# Patient Record
Sex: MALE | Race: WHITE | Employment: OTHER | URBAN - METROPOLITAN AREA
[De-identification: names, ages, dates, MRNs, and addresses within clinical notes are randomized per-mention and may not be internally consistent; named-entity substitution may affect disease eponyms.]

---

## 2017-06-03 ENCOUNTER — HOSPITAL ENCOUNTER (EMERGENCY)
Age: 72
Discharge: HOME | End: 2017-06-03
Attending: EMERGENCY MEDICINE
Payer: COMMERCIAL

## 2017-06-03 VITALS
TEMPERATURE: 101 F | WEIGHT: 240 LBS | HEIGHT: 70 IN | SYSTOLIC BLOOD PRESSURE: 95 MMHG | RESPIRATION RATE: 14 BRPM | BODY MASS INDEX: 34.36 KG/M2 | DIASTOLIC BLOOD PRESSURE: 56 MMHG | OXYGEN SATURATION: 92 % | HEART RATE: 80 BPM

## 2017-06-03 DIAGNOSIS — J20.9 ACUTE BRONCHITIS, UNSPECIFIED ORGANISM: Primary | ICD-10-CM

## 2017-06-03 DIAGNOSIS — T50.905A ADVERSE REACTION TO DRUG, INITIAL ENCOUNTER: ICD-10-CM

## 2017-06-03 DIAGNOSIS — I48.91 ATRIAL FIBRILLATION, UNSPECIFIED TYPE (HCC): ICD-10-CM

## 2017-06-03 LAB
ANION GAP SERPL CALCULATED.3IONS-SCNC: 12 MEQ/L (ref 7–13)
BANDED NEUTROPHILS RELATIVE PERCENT: 21 %
BASOPHILS ABSOLUTE: 0.1 K/UL (ref 0–0.2)
BASOPHILS RELATIVE PERCENT: 1 %
BUN BLDV-MCNC: 21 MG/DL (ref 8–23)
CALCIUM SERPL-MCNC: 8.9 MG/DL (ref 8.6–10.2)
CHLORIDE BLD-SCNC: 98 MEQ/L (ref 98–107)
CO2: 26 MEQ/L (ref 22–29)
CREAT SERPL-MCNC: 0.85 MG/DL (ref 0.7–1.2)
DOHLE BODIES: ABNORMAL
EOSINOPHILS ABSOLUTE: 0.1 K/UL (ref 0–0.7)
EOSINOPHILS RELATIVE PERCENT: 1 %
GFR AFRICAN AMERICAN: >60
GFR NON-AFRICAN AMERICAN: >60
GLUCOSE BLD-MCNC: 88 MG/DL (ref 74–109)
HCT VFR BLD CALC: 47.4 % (ref 42–52)
HEMOGLOBIN: 16.1 G/DL (ref 14–18)
LYMPHOCYTES ABSOLUTE: 1.7 K/UL (ref 1–4.8)
LYMPHOCYTES RELATIVE PERCENT: 26 %
MCH RBC QN AUTO: 33.5 PG (ref 27–31.3)
MCHC RBC AUTO-ENTMCNC: 33.9 % (ref 33–37)
MCV RBC AUTO: 98.9 FL (ref 80–100)
MONOCYTES ABSOLUTE: 0.1 K/UL (ref 0.2–0.8)
MONOCYTES RELATIVE PERCENT: 1 %
NEUTROPHILS ABSOLUTE: 4.8 K/UL (ref 1.4–6.5)
NEUTROPHILS RELATIVE PERCENT: 51 %
PDW BLD-RTO: 14.7 % (ref 11.5–14.5)
PLATELET # BLD: 115 K/UL (ref 130–400)
PLATELET SLIDE REVIEW: ABNORMAL
POTASSIUM SERPL-SCNC: 4 MEQ/L (ref 3.5–5.1)
RBC # BLD: 4.8 M/UL (ref 4.7–6.1)
RBC # BLD: NORMAL 10*6/UL
S PYO AG THROAT QL: NEGATIVE
SMUDGE CELLS: 6.7
SODIUM BLD-SCNC: 136 MEQ/L (ref 132–144)
TROPONIN: <0.01 NG/ML (ref 0–0.01)
VACUOLATED NEUTROPHILS: PRESENT
WBC # BLD: 6.6 K/UL (ref 4.8–10.8)

## 2017-06-03 PROCEDURE — 84484 ASSAY OF TROPONIN QUANT: CPT

## 2017-06-03 PROCEDURE — 87081 CULTURE SCREEN ONLY: CPT

## 2017-06-03 PROCEDURE — 87880 STREP A ASSAY W/OPTIC: CPT

## 2017-06-03 PROCEDURE — 36415 COLL VENOUS BLD VENIPUNCTURE: CPT

## 2017-06-03 PROCEDURE — 85025 COMPLETE CBC W/AUTO DIFF WBC: CPT

## 2017-06-03 PROCEDURE — 93005 ELECTROCARDIOGRAM TRACING: CPT

## 2017-06-03 PROCEDURE — 99284 EMERGENCY DEPT VISIT MOD MDM: CPT

## 2017-06-03 PROCEDURE — 80048 BASIC METABOLIC PNL TOTAL CA: CPT

## 2017-06-03 RX ORDER — SODIUM CHLORIDE 0.9 % (FLUSH) 0.9 %
3 SYRINGE (ML) INJECTION EVERY 8 HOURS
Status: DISCONTINUED | OUTPATIENT
Start: 2017-06-03 | End: 2017-06-03 | Stop reason: HOSPADM

## 2017-06-03 RX ORDER — AZITHROMYCIN 250 MG/1
TABLET, FILM COATED ORAL
Qty: 1 PACKET | Refills: 0 | Status: ON HOLD | OUTPATIENT
Start: 2017-06-03 | End: 2018-01-11

## 2017-06-05 LAB — S PYO THROAT QL CULT: NORMAL

## 2017-06-06 LAB
EKG ATRIAL RATE: 78 BPM
EKG Q-T INTERVAL: 330 MS
EKG QRS DURATION: 84 MS
EKG QTC CALCULATION (BAZETT): 442 MS
EKG R AXIS: 41 DEGREES
EKG T AXIS: -7 DEGREES
EKG VENTRICULAR RATE: 108 BPM

## 2017-12-12 LAB
ALBUMIN SERPL-MCNC: 4.1 G/DL (ref 3.9–4.9)
ALP BLD-CCNC: 85 U/L (ref 35–104)
ALT SERPL-CCNC: 45 U/L (ref 0–41)
ANION GAP SERPL CALCULATED.3IONS-SCNC: 15 MEQ/L (ref 9–17)
AST SERPL-CCNC: 28 U/L (ref 0–40)
BILIRUB SERPL-MCNC: 0.6 MG/DL (ref 0–1.2)
BUN BLDV-MCNC: 13 MG/DL (ref 8–23)
CALCIUM SERPL-MCNC: 9.1 MG/DL (ref 8.6–10.2)
CHLORIDE BLD-SCNC: 98 MEQ/L (ref 97–107)
CHOLESTEROL, TOTAL: 142 MG/DL (ref 0–199)
CO2: 27 MEQ/L (ref 17–24)
CREAT SERPL-MCNC: 0.88 MG/DL (ref 0.7–1.2)
GFR AFRICAN AMERICAN: >60
GFR NON-AFRICAN AMERICAN: >60
GLOBULIN: 2.2 G/DL (ref 2.3–3.5)
GLUCOSE BLD-MCNC: 107 MG/DL (ref 74–109)
HDLC SERPL-MCNC: 34 MG/DL (ref 40–59)
LDL CHOLESTEROL CALCULATED: 72 MG/DL (ref 0–129)
POTASSIUM SERPL-SCNC: 4.7 MEQ/L (ref 3.2–4.4)
SODIUM BLD-SCNC: 140 MEQ/L (ref 132–138)
TOTAL PROTEIN: 6.3 G/DL (ref 6.4–8.1)
TRIGL SERPL-MCNC: 179 MG/DL (ref 0–200)

## 2018-01-11 ENCOUNTER — HOSPITAL ENCOUNTER (OUTPATIENT)
Age: 73
Setting detail: OUTPATIENT SURGERY
Discharge: HOME | End: 2018-01-11
Attending: SPECIALIST | Admitting: SPECIALIST
Payer: COMMERCIAL

## 2018-01-11 ENCOUNTER — ANESTHESIA (OUTPATIENT)
Dept: ENDOSCOPY | Age: 73
End: 2018-01-11
Payer: COMMERCIAL

## 2018-01-11 ENCOUNTER — ANESTHESIA EVENT (OUTPATIENT)
Dept: ENDOSCOPY | Age: 73
End: 2018-01-11
Payer: COMMERCIAL

## 2018-01-11 VITALS
SYSTOLIC BLOOD PRESSURE: 132 MMHG | DIASTOLIC BLOOD PRESSURE: 74 MMHG | OXYGEN SATURATION: 99 % | RESPIRATION RATE: 14 BRPM

## 2018-01-11 VITALS
HEIGHT: 70 IN | DIASTOLIC BLOOD PRESSURE: 52 MMHG | HEART RATE: 93 BPM | WEIGHT: 230 LBS | BODY MASS INDEX: 32.93 KG/M2 | SYSTOLIC BLOOD PRESSURE: 120 MMHG | TEMPERATURE: 98.1 F | OXYGEN SATURATION: 95 % | RESPIRATION RATE: 16 BRPM

## 2018-01-11 PROCEDURE — 88305 TISSUE EXAM BY PATHOLOGIST: CPT

## 2018-01-11 PROCEDURE — 2500000003 HC RX 250 WO HCPCS: Performed by: NURSE ANESTHETIST, CERTIFIED REGISTERED

## 2018-01-11 PROCEDURE — 88342 IMHCHEM/IMCYTCHM 1ST ANTB: CPT

## 2018-01-11 PROCEDURE — 6360000002 HC RX W HCPCS: Performed by: NURSE ANESTHETIST, CERTIFIED REGISTERED

## 2018-01-11 PROCEDURE — 3700000001 HC ADD 15 MINUTES (ANESTHESIA): Performed by: SPECIALIST

## 2018-01-11 PROCEDURE — 3609017100 HC EGD: Performed by: SPECIALIST

## 2018-01-11 PROCEDURE — 7100000010 HC PHASE II RECOVERY - FIRST 15 MIN: Performed by: SPECIALIST

## 2018-01-11 PROCEDURE — 3609027000 HC COLONOSCOPY: Performed by: SPECIALIST

## 2018-01-11 PROCEDURE — 3700000000 HC ANESTHESIA ATTENDED CARE: Performed by: SPECIALIST

## 2018-01-11 RX ORDER — MULTIVIT-MIN/IRON/FOLIC ACID/K 18-600-40
1 CAPSULE ORAL DAILY
COMMUNITY

## 2018-01-11 RX ORDER — SODIUM CHLORIDE 0.9 % (FLUSH) 0.9 %
10 SYRINGE (ML) INJECTION EVERY 12 HOURS SCHEDULED
Status: DISCONTINUED | OUTPATIENT
Start: 2018-01-11 | End: 2018-01-11 | Stop reason: HOSPADM

## 2018-01-11 RX ORDER — GLYCOPYRROLATE 0.2 MG/ML
INJECTION INTRAMUSCULAR; INTRAVENOUS PRN
Status: DISCONTINUED | OUTPATIENT
Start: 2018-01-11 | End: 2018-01-11 | Stop reason: SDUPTHER

## 2018-01-11 RX ORDER — LIDOCAINE HYDROCHLORIDE 10 MG/ML
1 INJECTION, SOLUTION EPIDURAL; INFILTRATION; INTRACAUDAL; PERINEURAL
Status: DISCONTINUED | OUTPATIENT
Start: 2018-01-11 | End: 2018-01-11 | Stop reason: HOSPADM

## 2018-01-11 RX ORDER — SODIUM CHLORIDE 0.9 % (FLUSH) 0.9 %
10 SYRINGE (ML) INJECTION PRN
Status: DISCONTINUED | OUTPATIENT
Start: 2018-01-11 | End: 2018-01-11 | Stop reason: HOSPADM

## 2018-01-11 RX ORDER — PROPOFOL 10 MG/ML
INJECTION, EMULSION INTRAVENOUS PRN
Status: DISCONTINUED | OUTPATIENT
Start: 2018-01-11 | End: 2018-01-11 | Stop reason: SDUPTHER

## 2018-01-11 RX ORDER — LIDOCAINE HYDROCHLORIDE 20 MG/ML
INJECTION, SOLUTION INFILTRATION; PERINEURAL PRN
Status: DISCONTINUED | OUTPATIENT
Start: 2018-01-11 | End: 2018-01-11 | Stop reason: SDUPTHER

## 2018-01-11 RX ORDER — SODIUM CHLORIDE 9 MG/ML
INJECTION, SOLUTION INTRAVENOUS CONTINUOUS
Status: DISCONTINUED | OUTPATIENT
Start: 2018-01-11 | End: 2018-01-11 | Stop reason: HOSPADM

## 2018-01-11 RX ORDER — PROPOFOL 10 MG/ML
INJECTION, EMULSION INTRAVENOUS CONTINUOUS PRN
Status: DISCONTINUED | OUTPATIENT
Start: 2018-01-11 | End: 2018-01-11 | Stop reason: SDUPTHER

## 2018-01-11 RX ORDER — ASCORBIC ACID
1 CRYSTALS ORAL DAILY
COMMUNITY

## 2018-01-11 RX ADMIN — LIDOCAINE HYDROCHLORIDE 60 MG: 20 INJECTION, SOLUTION INFILTRATION; PERINEURAL at 10:35

## 2018-01-11 RX ADMIN — GLYCOPYRROLATE 0.2 MG: 0.2 INJECTION INTRAMUSCULAR; INTRAVENOUS at 10:56

## 2018-01-11 RX ADMIN — PROPOFOL 80 MG: 10 INJECTION, EMULSION INTRAVENOUS at 10:35

## 2018-01-11 RX ADMIN — PROPOFOL 120 MCG/KG/MIN: 10 INJECTION, EMULSION INTRAVENOUS at 10:35

## 2018-01-11 ASSESSMENT — ENCOUNTER SYMPTOMS: SHORTNESS OF BREATH: 1

## 2018-01-11 ASSESSMENT — LIFESTYLE VARIABLES: SMOKING_STATUS: 0

## 2018-01-11 NOTE — ANESTHESIA PRE PROCEDURE
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,    Alcohol use 4.2 oz/week     2 Glasses of wine, 2 Cans of beer per week      Comment: 5-6x a week                                Counseling given: Not Answered      Vital Signs (Current):   Vitals:    01/11/18 1003   BP: (!) 163/83   Pulse: 77   Resp: 16   Temp: 36.7 °C (98.1 °F)   TempSrc: Temporal   SpO2: 92%   Weight: 230 lb (104.3 kg)   Height: 5' 10\" (1.778 m)                                              BP Readings from Last 3 Encounters:   01/11/18 (!) 163/83   06/03/17 (!) 95/56   12/20/16 120/80       NPO Status: Time of last liquid consumption: 2330                        Time of last solid consumption: 1800                        Date of last liquid consumption: 01/10/18                        Date of last solid food consumption: 01/09/18    BMI:   Wt Readings from Last 3 Encounters:   01/11/18 230 lb (104.3 kg)   06/03/17 240 lb (108.9 kg)   12/20/16 235 lb (106.6 kg)     Body mass index is 33 kg/m². CBC:   Lab Results   Component Value Date    WBC 6.6 06/03/2017    RBC 4.80 06/03/2017    HGB 16.1 06/03/2017    HCT 47.4 06/03/2017    MCV 98.9 06/03/2017    RDW 14.7 06/03/2017     06/03/2017       CMP:   Lab Results   Component Value Date     12/12/2017    K 4.7 12/12/2017    CL 98 12/12/2017    CO2 27 12/12/2017    BUN 13 12/12/2017    CREATININE 0.88 12/12/2017    GFRAA >60.0 12/12/2017    LABGLOM >60.0 12/12/2017    GLUCOSE 107 12/12/2017    PROT 6.3 12/12/2017    CALCIUM 9.1 12/12/2017    BILITOT 0.6 12/12/2017    ALKPHOS 85 12/12/2017    AST 28 12/12/2017    ALT 45 12/12/2017       POC Tests: No results for input(s): POCGLU, POCNA, POCK, POCCL, POCBUN, POCHEMO, POCHCT in the last 72 hours.     Coags:   Lab Results   Component Value Date    PROTIME 10.5 08/21/2015    INR 1.0 08/21/2015    APTT 26.1 08/21/2015       HCG (If Applicable): No results found for: PREGTESTUR, PREGSERUM, HCG, HCGQUANT     ABGs: No results found for: PHART, PO2ART, LPS7NYP, HME0WBD, BEART, E4HSSTRM     Type & Screen (If Applicable):  No results found for: LABABO, [address]    Anesthesia Evaluation  Patient summary reviewed and Nursing notes reviewed no history of anesthetic complications:   Airway: Mallampati: II  TM distance: >3 FB   Neck ROM: full  Mouth opening: > = 3 FB Dental: normal exam         Pulmonary:normal exam    (+) shortness of breath:      (-) not a current smoker                           Cardiovascular:  Exercise tolerance: no interval change,   (+) hypertension: no interval change, CABG/stent: no interval change, dysrhythmias: atrial fibrillation, hyperlipidemia        Rhythm: regular  Rate: normal           Beta Blocker:  Dose within 24 Hrs         Neuro/Psych:   (+) neuromuscular disease:,             GI/Hepatic/Renal:   (+) GERD: no interval change,           Endo/Other: Negative Endo/Other ROS                    Abdominal:           Vascular:                                        Anesthesia Plan      MAC     ASA 3       Induction: intravenous. Anesthetic plan and risks discussed with patient. Plan discussed with attending and CRNA.                   Dolly Reyes CRNA   1/11/2018

## 2018-05-15 LAB
ALBUMIN SERPL-MCNC: 4.3 G/DL (ref 3.9–4.9)
ALP BLD-CCNC: 77 U/L (ref 35–104)
ALT SERPL-CCNC: 41 U/L (ref 0–41)
ANION GAP SERPL CALCULATED.3IONS-SCNC: 12 MEQ/L (ref 7–13)
AST SERPL-CCNC: 30 U/L (ref 0–40)
BILIRUB SERPL-MCNC: 0.6 MG/DL (ref 0–1.2)
BUN BLDV-MCNC: 17 MG/DL (ref 8–23)
CALCIUM SERPL-MCNC: 9.3 MG/DL (ref 8.6–10.2)
CHLORIDE BLD-SCNC: 98 MEQ/L (ref 98–107)
CHOLESTEROL, TOTAL: 145 MG/DL (ref 0–199)
CO2: 28 MEQ/L (ref 22–29)
CREAT SERPL-MCNC: 0.87 MG/DL (ref 0.7–1.2)
GFR AFRICAN AMERICAN: >60
GFR NON-AFRICAN AMERICAN: >60
GLOBULIN: 2 G/DL (ref 2.3–3.5)
GLUCOSE BLD-MCNC: 105 MG/DL (ref 74–109)
HDLC SERPL-MCNC: 37 MG/DL (ref 40–59)
LDL CHOLESTEROL CALCULATED: 75 MG/DL (ref 0–129)
POTASSIUM SERPL-SCNC: 4.5 MEQ/L (ref 3.5–5.1)
SODIUM BLD-SCNC: 138 MEQ/L (ref 132–144)
TOTAL PROTEIN: 6.3 G/DL (ref 6.4–8.1)
TRIGL SERPL-MCNC: 165 MG/DL (ref 0–200)

## 2019-04-12 LAB
ALBUMIN SERPL-MCNC: 4.2 G/DL (ref 3.5–4.6)
ALP BLD-CCNC: 76 U/L (ref 35–104)
ALT SERPL-CCNC: 30 U/L (ref 0–41)
ANION GAP SERPL CALCULATED.3IONS-SCNC: 15 MEQ/L (ref 9–15)
AST SERPL-CCNC: 25 U/L (ref 0–40)
BILIRUB SERPL-MCNC: 0.7 MG/DL (ref 0.2–0.7)
BUN BLDV-MCNC: 13 MG/DL (ref 8–23)
CALCIUM SERPL-MCNC: 9.1 MG/DL (ref 8.5–9.9)
CHLORIDE BLD-SCNC: 98 MEQ/L (ref 95–107)
CHOLESTEROL, TOTAL: 131 MG/DL (ref 0–199)
CO2: 25 MEQ/L (ref 20–31)
CREAT SERPL-MCNC: 0.75 MG/DL (ref 0.7–1.2)
GFR AFRICAN AMERICAN: >60
GFR NON-AFRICAN AMERICAN: >60
GLOBULIN: 2.4 G/DL (ref 2.3–3.5)
GLUCOSE BLD-MCNC: 105 MG/DL (ref 70–99)
HDLC SERPL-MCNC: 43 MG/DL (ref 40–59)
LDL CHOLESTEROL CALCULATED: 61 MG/DL (ref 0–129)
POTASSIUM SERPL-SCNC: 4.3 MEQ/L (ref 3.4–4.9)
SODIUM BLD-SCNC: 138 MEQ/L (ref 135–144)
TOTAL PROTEIN: 6.6 G/DL (ref 6.3–8)
TRIGL SERPL-MCNC: 135 MG/DL (ref 0–150)

## 2019-10-14 LAB
ALBUMIN SERPL-MCNC: 4.4 G/DL (ref 3.5–4.6)
ALP BLD-CCNC: 75 U/L (ref 35–104)
ALT SERPL-CCNC: 41 U/L (ref 0–41)
ANION GAP SERPL CALCULATED.3IONS-SCNC: 12 MEQ/L (ref 9–15)
AST SERPL-CCNC: 28 U/L (ref 0–40)
BILIRUB SERPL-MCNC: 0.5 MG/DL (ref 0.2–0.7)
BUN BLDV-MCNC: 15 MG/DL (ref 8–23)
CALCIUM SERPL-MCNC: 9.2 MG/DL (ref 8.5–9.9)
CHLORIDE BLD-SCNC: 99 MEQ/L (ref 95–107)
CHOLESTEROL, TOTAL: 135 MG/DL (ref 0–199)
CO2: 28 MEQ/L (ref 20–31)
CREAT SERPL-MCNC: 0.96 MG/DL (ref 0.7–1.2)
GFR AFRICAN AMERICAN: >60
GFR NON-AFRICAN AMERICAN: >60
GLOBULIN: 2.4 G/DL (ref 2.3–3.5)
GLUCOSE BLD-MCNC: 111 MG/DL (ref 70–99)
HDLC SERPL-MCNC: 39 MG/DL (ref 40–59)
LDL CHOLESTEROL CALCULATED: 59 MG/DL (ref 0–129)
POTASSIUM SERPL-SCNC: 4.3 MEQ/L (ref 3.4–4.9)
SODIUM BLD-SCNC: 139 MEQ/L (ref 135–144)
TOTAL PROTEIN: 6.8 G/DL (ref 6.3–8)
TRIGL SERPL-MCNC: 184 MG/DL (ref 0–150)

## 2020-06-05 ENCOUNTER — HOSPITAL ENCOUNTER (EMERGENCY)
Age: 75
Discharge: HOME | End: 2020-06-06
Attending: EMERGENCY MEDICINE
Payer: COMMERCIAL

## 2020-06-05 VITALS
DIASTOLIC BLOOD PRESSURE: 94 MMHG | SYSTOLIC BLOOD PRESSURE: 150 MMHG | TEMPERATURE: 98.5 F | OXYGEN SATURATION: 95 % | WEIGHT: 250 LBS | RESPIRATION RATE: 16 BRPM | HEART RATE: 83 BPM | BODY MASS INDEX: 35.79 KG/M2 | HEIGHT: 70 IN

## 2020-06-05 PROCEDURE — 6370000000 HC RX 637 (ALT 250 FOR IP): Performed by: EMERGENCY MEDICINE

## 2020-06-05 PROCEDURE — 12001 RPR S/N/AX/GEN/TRNK 2.5CM/<: CPT

## 2020-06-05 PROCEDURE — 99282 EMERGENCY DEPT VISIT SF MDM: CPT

## 2020-06-05 RX ORDER — WOUND DRESSING ADHESIVE - LIQUID
1 LIQUID MISCELLANEOUS ONCE
Status: COMPLETED | OUTPATIENT
Start: 2020-06-05 | End: 2020-06-05

## 2020-06-05 RX ADMIN — Medication 1 EACH: at 23:20

## 2020-06-05 RX ADMIN — WOUND DRESSING ADHESIVE - LIQUID 0.7 ML: LIQUID at 23:20

## 2020-06-05 ASSESSMENT — ENCOUNTER SYMPTOMS
ABDOMINAL PAIN: 0
NAUSEA: 0
SHORTNESS OF BREATH: 0
VOMITING: 0
DIARRHEA: 0
PHOTOPHOBIA: 0
SORE THROAT: 0
COUGH: 0
CHEST TIGHTNESS: 0
EYE DISCHARGE: 0
WHEEZING: 0
ABDOMINAL DISTENTION: 0

## 2020-06-06 NOTE — ED PROVIDER NOTES
 Arthritis     Back pain     CAD (coronary artery disease)     Chicken pox     H/O bronchitis     H/O hemorrhoids     H/O: pneumonia     Heart disease     High blood pressure     Hyperlipidemia     Measles     Numbness     Shortness of breath          SURGICALHISTORY       Past Surgical History:   Procedure Laterality Date    BACK SURGERY      CORONARY ANGIOPLASTY WITH STENT PLACEMENT      LUMBAR LAMINECTOMY      MS COLONOSCOPY FLX DX W/COLLJ SPEC WHEN PFRMD N/A 1/11/2018    COLONOSCOPY performed by Davion Wahl MD at . [address] ESOPHAGOGASTRODUODENOSCOPY TRANSORAL DIAGNOSTIC N/A 1/11/2018    EGD ESOPHAGOGASTRODUODENOSCOPY performed by Davion Wahl MD at [address]       Previous Medications    ASCORBIC ACID (VITAMIN C) 500 MG CAPS    Take 1 tablet by mouth daily    ASPIRIN 81 MG TABLET    Take 325 mg by mouth    ATORVASTATIN (LIPITOR) 40 MG TABLET    Take by mouth    CHOLECALCIFEROL (VITAMIN D3) 5000 UNITS TABS    Take 5,000 Units by mouth daily    DICLOFENAC (VOLTAREN) 75 MG EC TABLET    Take 75 mg by mouth 2 times daily     FINASTERIDE (PROSCAR) 5 MG TABLET    Take 2.5 mg by mouth daily     GABAPENTIN (NEURONTIN) 600 MG TABLET    TAKE 1 TABLET EVERY EVENING    HANDICAP PLACARD MISC    by Does not apply route    METOPROLOL (TOPROL-XL) 50 MG XL TABLET    Take 25 mg by mouth daily     NUTRITIONAL SUPPLEMENTS PO    Take by mouth    OMEGA-3 FATTY ACIDS (FISH OIL) 1200 MG CPDR    Take 1 capsule by mouth    PRIMIDONE (MYSOLINE) 50 MG TABLET    Take 50 mg by mouth daily     RIVAROXABAN (XARELTO) 20 MG TABS TABLET    Take 20 mg by mouth daily (with breakfast)     TAMSULOSIN (FLOMAX) 0.4 MG CAPSULE    Take 1 capsule by mouth daily for 10 days    TERAZOSIN (HYTRIN) 5 MG CAPSULE        VALSARTAN (DIOVAN) 160 MG TABLET        VIAGRA 100 MG TABLET        VITAMIN E 200 UNITS TABS    Take 1 tablet by mouth daily       ALLERGIES     Patient has no known allergies. FAMILY HISTORY       Family History   Problem Relation Age of Onset    Cancer Mother     Diabetes Mother     Cancer Father     High Blood Pressure Father     Colon Cancer Father           SOCIAL HISTORY       Social History     Socioeconomic History    Marital status:      Spouse name: None    Number of children: None    Years of education: None    Highest education level: None   Occupational History    None   Social Needs    Financial resource strain: None    Food insecurity     Worry: None     Inability: None    Transportation needs     Medical: None     Non-medical: None   Tobacco Use    Smoking status: Former Smoker     Start date: 1961     Last attempt to quit: 1970     Years since quittin.4    Smokeless tobacco: Never Used    Tobacco comment: 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,   Substance and Sexual Activity    Alcohol use:  Yes     Alcohol/week: 7.0 standard drinks     Types: 2 Glasses of wine, 2 Cans of beer per week     Comment: 5-6x a week    Drug use: No    Sexual activity: None   Lifestyle    Physical activity     Days per week: None     Minutes per session: None    Stress: None   Relationships    Social connections     Talks on phone: None     Gets together: None     Attends Jew service: None     Active member of club or organization: None     Attends meetings of clubs or organizations: None     Relationship status: None    Intimate partner violence     Fear of current or ex partner: None     Emotionally abused: None     Physically abused: None     Forced sexual activity: None   Other Topics Concern    None   Social History Narrative    None       SCREENINGS      @FLOW([id number])@      PHYSICAL EXAM    (up to 7 for level 4, 8 or more for level 5)     ED Triage Vitals   BP Temp Temp Source Pulse Resp SpO2 Height Weight   20 2248 20 2244 20 22420 06/05/20 2244 06/05/20 2244 06/05/20 2244 06/05/20 2244   (!) 150/94 98.5 °F (36.9 °C) Oral 83 16 95 % 5' 10\" (1.778 m) 250 lb (113.4 kg)       Physical Exam  Vitals signs and nursing note reviewed. Constitutional:       Appearance: He is well-developed. HENT:      Head: Normocephalic. Nose: Nose normal.      Mouth/Throat:      Mouth: Mucous membranes are moist.   Eyes:      Conjunctiva/sclera: Conjunctivae normal.      Pupils: Pupils are equal, round, and reactive to light. Neck:      Musculoskeletal: Normal range of motion and neck supple. Cardiovascular:      Rate and Rhythm: Normal rate and regular rhythm. Heart sounds: Normal heart sounds. Pulmonary:      Effort: Pulmonary effort is normal.      Breath sounds: Normal breath sounds. Abdominal:      General: Bowel sounds are normal.      Palpations: Abdomen is soft. Tenderness: There is no abdominal tenderness. There is no guarding. Musculoskeletal: Normal range of motion. Hands:    Skin:     General: Skin is warm and dry. Capillary Refill: Capillary refill takes less than 2 seconds. Neurological:      Mental Status: He is alert and oriented to person, place, and time. Psychiatric:         Mood and Affect: Mood normal.         DIAGNOSTIC RESULTS     EKG: All EKG's are interpreted by the Emergency Department Physician who either signs or Co-signsthis chart in the absence of a cardiologist.      RADIOLOGY:   Geryl Angst such as CT, Ultrasound and MRI are read by the radiologist. Plain radiographic images are visualized and preliminarily interpreted by the emergency physician with the below findings:      Interpretation per the Radiologist below, if available at the time ofthis note:    No orders to display         ED BEDSIDE ULTRASOUND:   Performed by ED Physician - none    LABS:  Labs Reviewed - No data to display    All other labs were within normal range or not returned as of this dictation.     EMERGENCY

## 2020-10-26 LAB
ALBUMIN SERPL-MCNC: 4.1 G/DL (ref 3.5–4.6)
ALP BLD-CCNC: 61 U/L (ref 35–104)
ALT SERPL-CCNC: 31 U/L (ref 0–41)
ANION GAP SERPL CALCULATED.3IONS-SCNC: 8 MEQ/L (ref 9–15)
AST SERPL-CCNC: 23 U/L (ref 0–40)
BILIRUB SERPL-MCNC: 0.8 MG/DL (ref 0.2–0.7)
BUN BLDV-MCNC: 13 MG/DL (ref 8–23)
CALCIUM SERPL-MCNC: 9.2 MG/DL (ref 8.5–9.9)
CHLORIDE BLD-SCNC: 101 MEQ/L (ref 95–107)
CHOLESTEROL, TOTAL: 132 MG/DL (ref 0–199)
CO2: 28 MEQ/L (ref 20–31)
CREAT SERPL-MCNC: 1.02 MG/DL (ref 0.7–1.2)
GFR AFRICAN AMERICAN: >60
GFR NON-AFRICAN AMERICAN: >60
GLOBULIN: 2.1 G/DL (ref 2.3–3.5)
GLUCOSE BLD-MCNC: 129 MG/DL (ref 70–99)
HDLC SERPL-MCNC: 39 MG/DL (ref 40–59)
LDL CHOLESTEROL CALCULATED: 57 MG/DL (ref 0–129)
POTASSIUM SERPL-SCNC: 4.3 MEQ/L (ref 3.4–4.9)
SODIUM BLD-SCNC: 137 MEQ/L (ref 135–144)
TOTAL PROTEIN: 6.2 G/DL (ref 6.3–8)
TRIGL SERPL-MCNC: 182 MG/DL (ref 0–150)

## 2021-02-18 ENCOUNTER — NURSE ONLY (OUTPATIENT)
Dept: PRIMARY CARE CLINIC | Age: 76
End: 2021-02-18

## 2021-02-19 LAB
SARS-COV-2: NOT DETECTED
SOURCE: NORMAL

## 2021-04-23 LAB
ALBUMIN SERPL-MCNC: 4.5 G/DL (ref 3.5–4.6)
ALP BLD-CCNC: 68 U/L (ref 35–104)
ALT SERPL-CCNC: 29 U/L (ref 0–41)
ANION GAP SERPL CALCULATED.3IONS-SCNC: 13 MEQ/L (ref 9–15)
AST SERPL-CCNC: 22 U/L (ref 0–40)
BILIRUB SERPL-MCNC: 0.8 MG/DL (ref 0.2–0.7)
BUN BLDV-MCNC: 13 MG/DL (ref 8–23)
CALCIUM SERPL-MCNC: 9.8 MG/DL (ref 8.5–9.9)
CHLORIDE BLD-SCNC: 102 MEQ/L (ref 95–107)
CHOLESTEROL, TOTAL: 123 MG/DL (ref 0–199)
CO2: 24 MEQ/L (ref 20–31)
CREAT SERPL-MCNC: 1.01 MG/DL (ref 0.7–1.2)
GFR AFRICAN AMERICAN: >60
GFR NON-AFRICAN AMERICAN: >60
GLOBULIN: 2.1 G/DL (ref 2.3–3.5)
GLUCOSE BLD-MCNC: 115 MG/DL (ref 70–99)
HDLC SERPL-MCNC: 35 MG/DL (ref 40–59)
LDL CHOLESTEROL CALCULATED: 59 MG/DL (ref 0–129)
POTASSIUM SERPL-SCNC: 4.5 MEQ/L (ref 3.4–4.9)
SODIUM BLD-SCNC: 139 MEQ/L (ref 135–144)
TOTAL PROTEIN: 6.6 G/DL (ref 6.3–8)
TRIGL SERPL-MCNC: 143 MG/DL (ref 0–150)

## 2021-07-13 ENCOUNTER — OFFICE VISIT (OUTPATIENT)
Dept: FAMILY MEDICINE CLINIC | Age: 76
End: 2021-07-13
Payer: COMMERCIAL

## 2021-07-13 VITALS
SYSTOLIC BLOOD PRESSURE: 122 MMHG | HEIGHT: 70 IN | HEART RATE: 83 BPM | DIASTOLIC BLOOD PRESSURE: 80 MMHG | WEIGHT: 248.4 LBS | TEMPERATURE: 97.7 F | BODY MASS INDEX: 35.56 KG/M2 | OXYGEN SATURATION: 97 %

## 2021-07-13 DIAGNOSIS — H60.392 ACUTE INFECTIVE OTITIS EXTERNA, LEFT: Primary | ICD-10-CM

## 2021-07-13 DIAGNOSIS — H92.12 OTORRHEA OF LEFT EAR: ICD-10-CM

## 2021-07-13 PROCEDURE — 4040F PNEUMOC VAC/ADMIN/RCVD: CPT | Performed by: NURSE PRACTITIONER

## 2021-07-13 PROCEDURE — G8427 DOCREV CUR MEDS BY ELIG CLIN: HCPCS | Performed by: NURSE PRACTITIONER

## 2021-07-13 PROCEDURE — 4130F TOPICAL PREP RX AOE: CPT | Performed by: NURSE PRACTITIONER

## 2021-07-13 PROCEDURE — 1123F ACP DISCUSS/DSCN MKR DOCD: CPT | Performed by: NURSE PRACTITIONER

## 2021-07-13 PROCEDURE — 99213 OFFICE O/P EST LOW 20 MIN: CPT | Performed by: NURSE PRACTITIONER

## 2021-07-13 PROCEDURE — 1036F TOBACCO NON-USER: CPT | Performed by: NURSE PRACTITIONER

## 2021-07-13 PROCEDURE — G8417 CALC BMI ABV UP PARAM F/U: HCPCS | Performed by: NURSE PRACTITIONER

## 2021-07-13 RX ORDER — RANOLAZINE 500 MG/1
500 TABLET, EXTENDED RELEASE ORAL 2 TIMES DAILY
COMMUNITY

## 2021-07-13 RX ORDER — CIPROFLOXACIN 500 MG/1
500 TABLET, FILM COATED ORAL 2 TIMES DAILY
Qty: 20 TABLET | Refills: 0 | Status: SHIPPED | OUTPATIENT
Start: 2021-07-13 | End: 2021-07-23

## 2021-07-13 RX ORDER — TADALAFIL 20 MG/1
20 TABLET ORAL PRN
COMMUNITY

## 2021-07-13 SDOH — ECONOMIC STABILITY: FOOD INSECURITY: WITHIN THE PAST 12 MONTHS, YOU WORRIED THAT YOUR FOOD WOULD RUN OUT BEFORE YOU GOT MONEY TO BUY MORE.: NEVER TRUE

## 2021-07-13 SDOH — ECONOMIC STABILITY: TRANSPORTATION INSECURITY
IN THE PAST 12 MONTHS, HAS THE LACK OF TRANSPORTATION KEPT YOU FROM MEDICAL APPOINTMENTS OR FROM GETTING MEDICATIONS?: NO

## 2021-07-13 SDOH — ECONOMIC STABILITY: FOOD INSECURITY: WITHIN THE PAST 12 MONTHS, THE FOOD YOU BOUGHT JUST DIDN'T LAST AND YOU DIDN'T HAVE MONEY TO GET MORE.: NEVER TRUE

## 2021-07-13 SDOH — ECONOMIC STABILITY: TRANSPORTATION INSECURITY
IN THE PAST 12 MONTHS, HAS LACK OF TRANSPORTATION KEPT YOU FROM MEETINGS, WORK, OR FROM GETTING THINGS NEEDED FOR DAILY LIVING?: NO

## 2021-07-13 ASSESSMENT — ENCOUNTER SYMPTOMS
FACIAL SWELLING: 1
DIARRHEA: 0
VOMITING: 0
CHEST TIGHTNESS: 0
SORE THROAT: 0
COUGH: 0
NAUSEA: 0
ABDOMINAL PAIN: 0

## 2021-07-13 ASSESSMENT — PATIENT HEALTH QUESTIONNAIRE - PHQ9
SUM OF ALL RESPONSES TO PHQ QUESTIONS 1-9: 0
SUM OF ALL RESPONSES TO PHQ9 QUESTIONS 1 & 2: 0
2. FEELING DOWN, DEPRESSED OR HOPELESS: 0
1. LITTLE INTEREST OR PLEASURE IN DOING THINGS: 0

## 2021-07-13 ASSESSMENT — SOCIAL DETERMINANTS OF HEALTH (SDOH): HOW HARD IS IT FOR YOU TO PAY FOR THE VERY BASICS LIKE FOOD, HOUSING, MEDICAL CARE, AND HEATING?: NOT HARD AT ALL

## 2021-07-13 NOTE — PATIENT INSTRUCTIONS
1. Antibiotic prescription sent to [facility]  2. Rx is for 10 days -> if your symptoms are fully resolved by day 7- may stop antibiotics after the 2nd dose on day 7  3. Continue water exclusion from affected ear   4. Call or return as needed if symptoms persist or worsen in the next 3 days     Antibiotic Instructions: Complete the full course of antibiotics as ordered. Take each dose with a small snack or meal to lessen potential GI upset. To prevent antibiotic resistance, please take medication as ordered and for the full duration even if you start to feel better. Consider intake of yogurt or probiotic during antibiotic use and for a few days after to help reduce the risk of developing a secondary infection. Separate the yogurt and antibiotic by at least 1 hour. Avoid alcohol while taking antibiotics. Patient Education   Swimmer's Ear: Care Instructions  Your Care Instructions     Swimmer's ear (otitis externa) is inflammation or infection of the ear canal. This is the passage that leads from the outer ear to the eardrum. Any water, sand, or other debris that gets into the ear canal and stays there can cause swimmer's ear. Putting cotton swabs or other items in the ear to clean it can also cause this problem. Swimmer's ear can be very painful. But you can treat the pain and infection with medicines. You should feel better in a few days. Follow-up care is a key part of your treatment and safety. Be sure to make and go to all appointments, and call your doctor if you are having problems. It's also a good idea to know your test results and keep a list of the medicines you take. How can you care for yourself at home? Cleaning and care  · Use antibiotic drops as your doctor directs. · Do not insert ear drops (other than the antibiotic ear drops) or anything else into the ear unless your doctor has told you to. · Avoid getting water in the ear until the problem clears up.  Use cotton lightly coated with petroleum jelly as an earplug. Do not use plastic earplugs. · Use a hair dryer set on low to carefully dry the ear after you shower. · To ease ear pain, hold a warm washcloth against your ear. · Take pain medicines exactly as directed. ? If the doctor gave you a prescription medicine for pain, take it as prescribed. ? If you are not taking a prescription pain medicine, ask your doctor if you can take an over-the-counter medicine. Inserting ear drops  · Warm the drops to body temperature by rolling the container in your hands. Or you can place it in a cup of warm water for a few minutes. · Lie down, with your ear facing up. · Place drops inside the ear. Follow your doctor's instructions (or the directions on the label) for how many drops to use. Gently wiggle the outer ear or pull the ear up and back to help the drops get into the ear. · It's important to keep the liquid in the ear canal for 3 to 5 minutes. When should you call for help? Call your doctor now or seek immediate medical care if:    · You have a new or higher fever.     · You have new or worse pain, swelling, warmth, or redness around or behind your ear.     · You have new or increasing pus or blood draining from your ear. Watch closely for changes in your health, and be sure to contact your doctor if:    · You are not getting better after 2 days (48 hours). Where can you learn more? Go to https://JustGo.Chalkboard. org and sign in to your [employer] account. Enter C706 in the [facility] box to learn more about \"Swimmer's Ear: Care Instructions. \"     If you do not have an account, please click on the \"Sign Up Now\" link. Current as of: December 2, 2020               Content Version: 12.9  © 2006-2021 [employer]. Care instructions adapted under license by [facility] ([facility]).  If you have questions about a medical condition or this instruction, always ask your healthcare professional. [address] any warranty or liability for your use of this information.

## 2021-07-13 NOTE — PROGRESS NOTES
7/13/2021    SUBJECTIVE:     Coleridge Landau, 66 y.o. male presents today with:  Chief Complaint   Patient presents with    Otalgia     Lt ear feels full and achy from swimming, x1week. Otalgia   There is pain in the left ear. This is a new problem. The current episode started in the past 7 days. Associated symptoms include ear discharge, headaches (little bit) and hearing loss (at baseline- currently worse). Pertinent negatives include no abdominal pain, coughing, diarrhea, sore throat or vomiting. Patient denies self- instrumentation trauma, failure to adhere to water precaution instructions, improper administration of ototopic medications, immunosuppression, hx of psoriasis, contact dermatitis. Has been swimming.     Outpatient Medications Marked as Taking for the 7/13/21 encounter (Office Visit) with LINDA Pinon CNP   Medication Sig Dispense Refill    tadalafil (CIALIS) 20 MG tablet Take 20 mg by mouth as needed for Erectile Dysfunction      Vitamin E 200 units TABS Take 1 tablet by mouth daily      Ascorbic Acid (VITAMIN C) 500 MG CAPS Take 1 tablet by mouth daily      Cholecalciferol (VITAMIN D3) 5000 units TABS Take 5,000 Units by mouth daily      rivaroxaban (XARELTO) 20 MG TABS tablet Take 20 mg by mouth daily (with breakfast)       primidone (MYSOLINE) 50 MG tablet Take 50 mg by mouth daily       Handicap Placard MISC by Does not apply route 1 each 0    aspirin 81 MG tablet Take 325 mg by mouth      diclofenac (VOLTAREN) 75 MG EC tablet Take 75 mg by mouth 2 times daily       atorvastatin (LIPITOR) 40 MG tablet Take by mouth      Omega-3 Fatty Acids (FISH OIL) 1200 MG CPDR Take 1 capsule by mouth      finasteride (PROSCAR) 5 MG tablet Take 2.5 mg by mouth daily       metoprolol (TOPROL-XL) 50 MG XL tablet Take 25 mg by mouth daily       terazosin (HYTRIN) 5 MG capsule       NUTRITIONAL SUPPLEMENTS PO Take by mouth       Review of Systems   Constitutional: Positive for fever (tactile). HENT: Positive for ear discharge, ear pain, facial swelling, hearing loss (at baseline- currently worse) and tinnitus (at baseline- currently worse). Negative for dental problem and sore throat. Respiratory: Negative for cough and chest tightness. Cardiovascular: Negative for chest pain. Gastrointestinal: Negative for abdominal pain, diarrhea, nausea and vomiting. Neurological: Positive for headaches (little bit). Hematological: Bruises/bleeds easily (xarelto). OBJECTIVE:     Vitals:    07/13/21 1440 07/13/21 1451   BP: (!) 140/80 122/80   Site:  Right Upper Arm   Position:  Sitting   Cuff Size:  Medium Adult   Pulse: 83    Temp: 97.7 °F (36.5 °C)    SpO2: 97%    Weight: 248 lb 6.4 oz (112.7 kg)    Height: 5' 10\" (1.778 m)      Physical Exam  Vitals reviewed. Constitutional:       General: He is not in acute distress. HENT:      Head: Normocephalic and atraumatic. Right Ear: Tympanic membrane, ear canal and external ear normal. No mastoid tenderness. Left Ear: Drainage, swelling (TM not well seen 2/2 swelling and debris ) and tenderness present. No mastoid tenderness. Nose: Nose normal.      Mouth/Throat:      Mouth: Mucous membranes are moist.      Pharynx: Oropharynx is clear. No oropharyngeal exudate. Eyes:      Pupils: Pupils are equal, round, and reactive to light. Neck:      Trachea: Trachea and phonation normal.   Cardiovascular:      Rate and Rhythm: Normal rate. Rhythm irregular. Pulmonary:      Effort: Pulmonary effort is normal. No tachypnea. Musculoskeletal:         General: Normal range of motion. Cervical back: Normal range of motion and neck supple. Edema (mild L ) present. Lymphadenopathy:      Cervical: No cervical adenopathy. Skin:     General: Skin is warm and dry. Neurological:      General: No focal deficit present. Mental Status: He is alert. Mental status is at baseline.      POC Testing Today: No results found for this visit on 07/13/21. ASSESSMENT & PLAN:     Milka Riley was seen today for otalgia. Diagnoses and all orders for this visit:    Acute infective otitis externa, left  -     ciprofloxacin (CIPRO) 500 MG tablet; Take 1 tablet by mouth 2 times daily for 10 days    Otorrhea of left ear  -     ciprofloxacin (CIPRO) 500 MG tablet; Take 1 tablet by mouth 2 times daily for 10 days      - Water exclusion from affected ear  - If sxs are fully resolved after 7 days- may stop antibiotic gtts    Return for follow-up as needed if symptoms do not begin to improve in the next 3 days . Side effects and adverse effects of any medication prescribed today, as well as treatment plan/rationale, follow-up care, and result expectations have been discussed with the patient. Milka Riley expresses understanding and wishes to proceed with the plan. Discussed signs and symptoms which require immediate follow-up in ED/call to 911. Understanding verbalized. I have reviewed and updated the electronic medical record.     Yany Silveira, APRN - CNP

## 2021-09-20 ENCOUNTER — OFFICE VISIT (OUTPATIENT)
Dept: FAMILY MEDICINE CLINIC | Age: 76
End: 2021-09-20
Payer: COMMERCIAL

## 2021-09-20 VITALS
RESPIRATION RATE: 18 BRPM | OXYGEN SATURATION: 98 % | DIASTOLIC BLOOD PRESSURE: 70 MMHG | WEIGHT: 247 LBS | TEMPERATURE: 97.5 F | HEART RATE: 64 BPM | BODY MASS INDEX: 35.36 KG/M2 | HEIGHT: 70 IN | SYSTOLIC BLOOD PRESSURE: 130 MMHG

## 2021-09-20 DIAGNOSIS — R22.0 LEFT FACIAL SWELLING: ICD-10-CM

## 2021-09-20 DIAGNOSIS — T63.441A BEE STING, ACCIDENTAL OR UNINTENTIONAL, INITIAL ENCOUNTER: Primary | ICD-10-CM

## 2021-09-20 PROCEDURE — 1123F ACP DISCUSS/DSCN MKR DOCD: CPT | Performed by: NURSE PRACTITIONER

## 2021-09-20 PROCEDURE — G8417 CALC BMI ABV UP PARAM F/U: HCPCS | Performed by: NURSE PRACTITIONER

## 2021-09-20 PROCEDURE — 99214 OFFICE O/P EST MOD 30 MIN: CPT | Performed by: NURSE PRACTITIONER

## 2021-09-20 PROCEDURE — 4040F PNEUMOC VAC/ADMIN/RCVD: CPT | Performed by: NURSE PRACTITIONER

## 2021-09-20 PROCEDURE — G8427 DOCREV CUR MEDS BY ELIG CLIN: HCPCS | Performed by: NURSE PRACTITIONER

## 2021-09-20 PROCEDURE — 96372 THER/PROPH/DIAG INJ SC/IM: CPT | Performed by: NURSE PRACTITIONER

## 2021-09-20 PROCEDURE — 1036F TOBACCO NON-USER: CPT | Performed by: NURSE PRACTITIONER

## 2021-09-20 RX ORDER — FEXOFENADINE HCL 180 MG/1
180 TABLET ORAL DAILY
Qty: 10 TABLET | Refills: 0 | COMMUNITY
Start: 2021-09-20 | End: 2021-09-30

## 2021-09-20 RX ORDER — TRIAMCINOLONE ACETONIDE 40 MG/ML
80 INJECTION, SUSPENSION INTRA-ARTICULAR; INTRAMUSCULAR ONCE
Status: COMPLETED | OUTPATIENT
Start: 2021-09-20 | End: 2021-09-20

## 2021-09-20 RX ORDER — OMEPRAZOLE 20 MG/1
20 CAPSULE, DELAYED RELEASE ORAL
Qty: 14 CAPSULE | Refills: 0 | COMMUNITY
Start: 2021-09-20 | End: 2021-09-27

## 2021-09-20 RX ORDER — TRIAMCINOLONE ACETONIDE 40 MG/ML
80 INJECTION, SUSPENSION INTRA-ARTICULAR; INTRAMUSCULAR ONCE
Status: DISCONTINUED | OUTPATIENT
Start: 2021-09-20 | End: 2021-09-20

## 2021-09-20 RX ADMIN — TRIAMCINOLONE ACETONIDE 80 MG: 40 INJECTION, SUSPENSION INTRA-ARTICULAR; INTRAMUSCULAR at 19:19

## 2021-09-20 ASSESSMENT — ENCOUNTER SYMPTOMS
SHORTNESS OF BREATH: 0
EYE DISCHARGE: 0
CHEST TIGHTNESS: 0
RHINORRHEA: 0
NAUSEA: 0
COUGH: 0
EYE PAIN: 0
SORE THROAT: 0
ABDOMINAL PAIN: 0
EYE REDNESS: 0
FACIAL SWELLING: 1
PHOTOPHOBIA: 0
DIARRHEA: 0
TROUBLE SWALLOWING: 0
VOICE CHANGE: 0

## 2021-09-20 NOTE — PROGRESS NOTES
Subjective  Oracio Melgar, 64 y.o. male presents today with:  Chief Complaint   Patient presents with    Insect Bite       HPI   Presents to [facility] after being stung by several bees today   This occurred around 4 pm this afternoon  Stung above left eye, right hand & right f/a  Left eye area is painful & has diffuse swelling above and below the eye   Denies vision changes to left eye   Swelling of the right hand   Reports R. FA and hand \"itching\"   Denies nausea or diarrhea  Denies tickle in throat   Denies throat clearing cough  Denies discomfort to neck   Denies chest tightness or SOB  Denies joint pain  Denies feeling anxious             Past Medical History:   Diagnosis Date    A-fib ([address]     Acid reflux     Arthritis     Back pain     CAD (coronary artery disease)     Chicken pox     H/O bronchitis     H/O hemorrhoids     H/O: pneumonia     Heart disease     High blood pressure     Hyperlipidemia     Measles     Numbness     Shortness of breath       Past Surgical History:   Procedure Laterality Date    BACK SURGERY      CORONARY ANGIOPLASTY WITH STENT PLACEMENT      LUMBAR LAMINECTOMY      NM COLONOSCOPY FLX DX W/COLLJ SPEC WHEN PFRMD N/A 1/11/2018    COLONOSCOPY performed by Ismael Ford MD at [facility] ESOPHAGOGASTRODUODENOSCOPY TRANSORAL DIAGNOSTIC N/A 1/11/2018    EGD ESOPHAGOGASTRODUODENOSCOPY performed by Ismael Ford MD at [facility]     Family History   Problem Relation Age of Onset    Cancer Mother     Diabetes Mother     Cancer Father     High Blood Pressure Father     Colon Cancer Father        Review of Systems   Constitutional: Negative for activity change, appetite change, chills, diaphoresis, fatigue and fever. HENT: Positive for facial swelling (left eye). Negative for rhinorrhea, sore throat, trouble swallowing and voice change. Eyes: Negative for photophobia, pain, discharge, redness and visual disturbance.    Respiratory: Negative for cough, chest tightness and shortness of breath. Cardiovascular: Negative for chest pain and palpitations. Gastrointestinal: Negative for abdominal pain, diarrhea, nausea and vomiting. Musculoskeletal: Negative for myalgias. Skin: Positive for color change and wound (bee stings). Neurological: Negative for dizziness, syncope, speech difficulty, weakness, light-headedness and headaches. Psychiatric/Behavioral: Negative for confusion. The patient is not nervous/anxious. PMH, Surgical Hx, Family Hx, and Social Hx reviewed and updated. Health Maintenance reviewed. Objective  Vitals:    09/20/21 1911   BP: 130/70   Pulse: 64   Resp: 18   Temp: 97.5 °F (36.4 °C)   SpO2: 98%   Weight: 247 lb (112 kg)   Height: 5' 10\" (1.778 m)     BP Readings from Last 3 Encounters:   09/20/21 130/70   07/13/21 122/80   06/05/20 (!) 150/94     Wt Readings from Last 3 Encounters:   09/20/21 247 lb (112 kg)   07/13/21 248 lb 6.4 oz (112.7 kg)   06/05/20 250 lb (113.4 kg)     Physical Exam  Vitals reviewed. Constitutional:       General: He is not in acute distress. Appearance: He is not toxic-appearing or diaphoretic. HENT:      Head: Normocephalic. Mouth/Throat:      Lips: Pink. Mouth: Mucous membranes are moist.      Pharynx: Oropharynx is clear. Uvula midline. No pharyngeal swelling, oropharyngeal exudate, posterior oropharyngeal erythema or uvula swelling. Tonsils: 0 on the right. 0 on the left. Eyes:      General: Vision grossly intact. Gaze aligned appropriately. No visual field deficit. Left eye: No foreign body or discharge. Conjunctiva/sclera: Conjunctivae normal.      Pupils: Pupils are equal, round, and reactive to light. Cardiovascular:      Rate and Rhythm: Normal rate and regular rhythm. Pulses: Normal pulses. Pulmonary:      Effort: Pulmonary effort is normal.      Breath sounds: Normal breath sounds and air entry.    Abdominal:      Palpations: Abdomen is soft. Musculoskeletal:         General: Normal range of motion. Cervical back: Normal range of motion. No rigidity. No pain with movement. Lymphadenopathy:      Head:      Right side of head: No submental, submandibular, tonsillar, preauricular or posterior auricular adenopathy. Left side of head: No submental, submandibular, tonsillar, preauricular or posterior auricular adenopathy. Cervical: No cervical adenopathy. Skin:     General: Skin is warm and dry. Capillary Refill: Capillary refill takes less than 2 seconds. Coloration: Skin is not pale. Findings: Wound (left eye, right FA & right hand) present. Comments: *SEE MEDIA*   Neurological:      General: No focal deficit present. Mental Status: He is alert and oriented to person, place, and time. Motor: Motor function is intact. Psychiatric:         Attention and Perception: Attention normal.         Mood and Affect: Mood normal.         Speech: Speech normal.         Behavior: Behavior normal.         Thought Content: Thought content normal.         Cognition and Memory: Cognition normal.       Assessment & Plan    Diagnosis Orders   1. Bee sting, accidental or unintentional, initial encounter  triamcinolone acetonide (KENALOG-40) injection 80 mg    omeprazole (PRILOSEC) 20 MG delayed release capsule    fexofenadine (ALLEGRA) 180 MG tablet    DISCONTINUED: triamcinolone acetonide (KENALOG-40) injection 80 mg   2. Left facial swelling       No orders of the defined types were placed in this encounter.     Orders Placed This Encounter   Medications    triamcinolone acetonide (KENALOG-40) injection 80 mg    omeprazole (PRILOSEC) 20 MG delayed release capsule     Sig: Take 1 capsule by mouth 2 times daily (before meals) for 7 days     Dispense:  14 capsule     Refill:  0    fexofenadine (ALLEGRA) 180 MG tablet     Sig: Take 1 tablet by mouth daily for 10 days     Dispense:  10 tablet     Refill: 0     >50% of 30 minutes was spent spent on counseling, answering questions, instructions on meds, examining, observation of subjective/objective findings after reaction of bee stings and coordinating the care based on my plan and assessment as noted. If symptoms worsen or fail to improve, seek care at the ER  If you experience any of the red flag s/s, seek care at the ER      Pt instructed on red flag s/s to go to the ER for or to call 911. Pt verbalized understanding. When to call for help  Call 911 anytime you think you may need emergency care. For example, call if:  · You have severe trouble breathing. Reviewed with the patient: current clinical status & medications. Side effects, adverse effects of the medications administered/prescribed today, as well as treatment plan/rationale and result expectations have been discussed with the patient who expressed understanding. How can you care for yourself at home? · Do not scratch or rub the skin where the sting or bite occurred. · Put a cold pack or ice cube on the area. Put a thin cloth between the ice and your skin. For some people, a paste of baking soda mixed with a little water helps relieve pain and decrease the reaction. · Take an over-the-counter antihistamine, such as diphenhydramine (Benadryl) or loratadine (Claritin), to relieve swelling, redness, and itching. Calamine lotion or hydrocortisone cream may also help. Close follow up to evaluate treatment results and for coordination of care. I have reviewed the patient's medical history in detail and updated the computerized patient record.       LINDA Enrique - NP

## 2021-09-20 NOTE — PROGRESS NOTES
After obtaining consent, and per orders of Dr. Veronika Hartmann, injection of kenalog 40 given in Left upper quad. gluteus by [address] [place] [address], [place]. Patient instructed to remain in clinic for 20 minutes afterwards, and to report any adverse reaction to me immediately.

## 2021-09-20 NOTE — PATIENT INSTRUCTIONS
Patient Education        Insect Stings and Bites: Care Instructions  Your Care Instructions  Stings and bites from bees, wasps, ants, and other insects often cause pain, swelling, redness, and itching. In some people, especially children, the redness and swelling may be worse. It may extend several inches beyond the affected area. But in most cases, stings and bites don't cause reactions all over the body. If you have had a reaction to an insect sting or bite, you are at risk for a reaction if you get stung or bitten again. Follow-up care is a key part of your treatment and safety. Be sure to make and go to all appointments, and call your doctor if you are having problems. It's also a good idea to know your test results and keep a list of the medicines you take. How can you care for yourself at home? · Do not scratch or rub the skin where the sting or bite occurred. · Put a cold pack or ice cube on the area. Put a thin cloth between the ice and your skin. For some people, a paste of baking soda mixed with a little water helps relieve pain and decrease the reaction. · Take an over-the-counter antihistamine, such as diphenhydramine (Benadryl) or loratadine (Claritin), to relieve swelling, redness, and itching. Calamine lotion or hydrocortisone cream may also help. Do not give antihistamines to your child unless you have checked with the doctor first.  · Be safe with medicines. If your doctor prescribed medicine for your allergy, take it exactly as prescribed. Call your doctor if you think you are having a problem with your medicine. You will get more details on the specific medicines your doctor prescribes. · Your doctor may prescribe a shot of epinephrine to carry with you in case you have a severe reaction. Learn how and when to give yourself the shot, and keep it with you at all times. Make sure it has not . · Go to the emergency room anytime you have a severe reaction.  Go even if you have given yourself epinephrine and are feeling better. Symptoms can come back. When should you call for help? Call 911 anytime you think you may need emergency care. For example, call if:    · You have symptoms of a severe allergic reaction. These may include:  ? Sudden raised, red areas (hives) all over your body. ? Swelling of the throat, mouth, lips, or tongue. ? Trouble breathing. ? Passing out (losing consciousness). Or you may feel very lightheaded or suddenly feel weak, confused, or restless. Call your doctor now or seek immediate medical care if:    · You have symptoms of an allergic reaction not right at the sting or bite, such as:  ? A rash or small area of hives (raised, red areas on the skin). ? Itching. ? Swelling. ? Belly pain, nausea, or vomiting.     · You have a lot of swelling around the site (such as your entire arm or leg is swollen).     · You have signs of infection, such as:  ? Increased pain, swelling, redness, or warmth around the sting. ? Red streaks leading from the area. ? Pus draining from the sting. ? A fever. Watch closely for changes in your health, and be sure to contact your doctor if:    · You do not get better as expected. Where can you learn more? Go to [email or web address]://[email or web address]. org and sign in to your [employer] account. Enter P390 in the [facility] box to learn more about \"Insect Stings and Bites: Care Instructions. \"     If you do not have an account, please click on the \"Sign Up Now\" link. Current as of: October 19, 2020               Content Version: 12.9  © 9195-4290 [employer], [employer]. Care instructions adapted under license by [facility] ([facility]). If you have questions about a medical condition or this instruction, always ask your healthcare professional. [address] any warranty or liability for your use of this information.

## 2021-09-21 ASSESSMENT — ENCOUNTER SYMPTOMS
VOMITING: 0
COLOR CHANGE: 1

## 2021-09-21 ASSESSMENT — VISUAL ACUITY: OU: 1

## 2021-10-27 LAB
ALBUMIN SERPL-MCNC: 4.4 G/DL (ref 3.5–4.6)
ALP BLD-CCNC: 64 U/L (ref 35–104)
ALT SERPL-CCNC: 40 U/L (ref 0–41)
ANION GAP SERPL CALCULATED.3IONS-SCNC: 11 MEQ/L (ref 9–15)
AST SERPL-CCNC: 23 U/L (ref 0–40)
BILIRUB SERPL-MCNC: 0.9 MG/DL (ref 0.2–0.7)
BUN BLDV-MCNC: 14 MG/DL (ref 8–23)
CALCIUM SERPL-MCNC: 9.2 MG/DL (ref 8.5–9.9)
CHLORIDE BLD-SCNC: 100 MEQ/L (ref 95–107)
CHOLESTEROL, TOTAL: 136 MG/DL (ref 0–199)
CO2: 29 MEQ/L (ref 20–31)
CREAT SERPL-MCNC: 1 MG/DL (ref 0.7–1.2)
GFR AFRICAN AMERICAN: >60
GFR NON-AFRICAN AMERICAN: >60
GLOBULIN: 2.2 G/DL (ref 2.3–3.5)
GLUCOSE BLD-MCNC: 118 MG/DL (ref 70–99)
HCT VFR BLD CALC: 47.5 % (ref 42–52)
HDLC SERPL-MCNC: 57 MG/DL (ref 40–59)
HEMOGLOBIN: 15.8 G/DL (ref 14–18)
LDL CHOLESTEROL CALCULATED: 66 MG/DL (ref 0–129)
MCH RBC QN AUTO: 33.2 PG (ref 27–31.3)
MCHC RBC AUTO-ENTMCNC: 33.2 % (ref 33–37)
MCV RBC AUTO: 99.8 FL (ref 80–100)
PDW BLD-RTO: 14 % (ref 11.5–14.5)
PLATELET # BLD: 133 K/UL (ref 130–400)
POTASSIUM SERPL-SCNC: 5 MEQ/L (ref 3.4–4.9)
RBC # BLD: 4.76 M/UL (ref 4.7–6.1)
SODIUM BLD-SCNC: 140 MEQ/L (ref 135–144)
TOTAL PROTEIN: 6.6 G/DL (ref 6.3–8)
TRIGL SERPL-MCNC: 67 MG/DL (ref 0–150)
WBC # BLD: 6.6 K/UL (ref 4.8–10.8)

## 2022-04-25 LAB
ALBUMIN SERPL-MCNC: 4.4 G/DL (ref 3.5–4.6)
ALP BLD-CCNC: 70 U/L (ref 35–104)
ALT SERPL-CCNC: 36 U/L (ref 0–41)
ANION GAP SERPL CALCULATED.3IONS-SCNC: 10 MEQ/L (ref 9–15)
AST SERPL-CCNC: 29 U/L (ref 0–40)
BILIRUB SERPL-MCNC: 0.9 MG/DL (ref 0.2–0.7)
BUN BLDV-MCNC: 16 MG/DL (ref 8–23)
CALCIUM SERPL-MCNC: 9.4 MG/DL (ref 8.5–9.9)
CHLORIDE BLD-SCNC: 101 MEQ/L (ref 95–107)
CHOLESTEROL, TOTAL: 134 MG/DL (ref 0–199)
CO2: 26 MEQ/L (ref 20–31)
CREAT SERPL-MCNC: 1.04 MG/DL (ref 0.7–1.2)
GFR AFRICAN AMERICAN: >60
GFR NON-AFRICAN AMERICAN: >60
GLOBULIN: 2.6 G/DL (ref 2.3–3.5)
GLUCOSE BLD-MCNC: 146 MG/DL (ref 70–99)
HCT VFR BLD CALC: 47.7 % (ref 42–52)
HDLC SERPL-MCNC: 35 MG/DL (ref 40–59)
HEMOGLOBIN: 15.8 G/DL (ref 14–18)
LDL CHOLESTEROL CALCULATED: 75 MG/DL (ref 0–129)
MCH RBC QN AUTO: 33 PG (ref 27–31.3)
MCHC RBC AUTO-ENTMCNC: 33 % (ref 33–37)
MCV RBC AUTO: 99.9 FL (ref 80–100)
PDW BLD-RTO: 13.7 % (ref 11.5–14.5)
PLATELET # BLD: 116 K/UL (ref 130–400)
POTASSIUM SERPL-SCNC: 4.6 MEQ/L (ref 3.4–4.9)
RBC # BLD: 4.78 M/UL (ref 4.7–6.1)
SODIUM BLD-SCNC: 137 MEQ/L (ref 135–144)
TOTAL PROTEIN: 7 G/DL (ref 6.3–8)
TRIGL SERPL-MCNC: 118 MG/DL (ref 0–150)
WBC # BLD: 4 K/UL (ref 4.8–10.8)

## 2022-08-16 ENCOUNTER — OFFICE VISIT (OUTPATIENT)
Dept: FAMILY MEDICINE CLINIC | Age: 77
End: 2022-08-16
Payer: COMMERCIAL

## 2022-08-16 VITALS
OXYGEN SATURATION: 97 % | HEIGHT: 70 IN | WEIGHT: 245 LBS | BODY MASS INDEX: 35.07 KG/M2 | DIASTOLIC BLOOD PRESSURE: 80 MMHG | TEMPERATURE: 97 F | HEART RATE: 75 BPM | SYSTOLIC BLOOD PRESSURE: 132 MMHG

## 2022-08-16 DIAGNOSIS — T63.441A ALLERGIC REACTION TO BEE STING: Primary | ICD-10-CM

## 2022-08-16 PROCEDURE — 99213 OFFICE O/P EST LOW 20 MIN: CPT | Performed by: PHYSICIAN ASSISTANT

## 2022-08-16 PROCEDURE — 1123F ACP DISCUSS/DSCN MKR DOCD: CPT | Performed by: PHYSICIAN ASSISTANT

## 2022-08-16 RX ORDER — PREDNISONE 20 MG/1
40 TABLET ORAL 2 TIMES DAILY
Qty: 20 TABLET | Refills: 0 | Status: SHIPPED | OUTPATIENT
Start: 2022-08-16 | End: 2022-08-21

## 2022-08-16 RX ORDER — CEPHALEXIN 500 MG/1
500 CAPSULE ORAL 3 TIMES DAILY
Qty: 21 CAPSULE | Refills: 0 | Status: SHIPPED | OUTPATIENT
Start: 2022-08-16 | End: 2022-08-23

## 2022-08-16 RX ORDER — DIPHENHYDRAMINE HCL 25 MG
25 CAPSULE ORAL EVERY 6 HOURS
Qty: 20 CAPSULE | Refills: 0 | Status: SHIPPED | OUTPATIENT
Start: 2022-08-16 | End: 2022-08-21

## 2022-08-16 SDOH — ECONOMIC STABILITY: FOOD INSECURITY: WITHIN THE PAST 12 MONTHS, THE FOOD YOU BOUGHT JUST DIDN'T LAST AND YOU DIDN'T HAVE MONEY TO GET MORE.: NEVER TRUE

## 2022-08-16 SDOH — ECONOMIC STABILITY: FOOD INSECURITY: WITHIN THE PAST 12 MONTHS, YOU WORRIED THAT YOUR FOOD WOULD RUN OUT BEFORE YOU GOT MONEY TO BUY MORE.: NEVER TRUE

## 2022-08-16 ASSESSMENT — PATIENT HEALTH QUESTIONNAIRE - PHQ9
SUM OF ALL RESPONSES TO PHQ9 QUESTIONS 1 & 2: 0
SUM OF ALL RESPONSES TO PHQ QUESTIONS 1-9: 0
2. FEELING DOWN, DEPRESSED OR HOPELESS: 0
1. LITTLE INTEREST OR PLEASURE IN DOING THINGS: 0
SUM OF ALL RESPONSES TO PHQ QUESTIONS 1-9: 0

## 2022-08-16 ASSESSMENT — ENCOUNTER SYMPTOMS
EYES NEGATIVE: 1
GASTROINTESTINAL NEGATIVE: 1
RESPIRATORY NEGATIVE: 1

## 2022-08-16 ASSESSMENT — SOCIAL DETERMINANTS OF HEALTH (SDOH): HOW HARD IS IT FOR YOU TO PAY FOR THE VERY BASICS LIKE FOOD, HOUSING, MEDICAL CARE, AND HEATING?: NOT HARD AT ALL

## 2022-08-16 NOTE — PROGRESS NOTES
to follow up with primary care physician for re evaluation and treatment. Return here if symptoms worsen or if new concerning symptoms arise. Patient verbalizes understanding of plan at discharge and has no further questions. DISPOSITION/PLAN   1.  Allergic reaction to bee sting

## 2022-09-23 ENCOUNTER — OFFICE VISIT (OUTPATIENT)
Dept: ORTHOPEDIC SURGERY | Age: 77
End: 2022-09-23
Payer: COMMERCIAL

## 2022-09-23 VITALS
HEIGHT: 70 IN | BODY MASS INDEX: 34.36 KG/M2 | OXYGEN SATURATION: 95 % | HEART RATE: 67 BPM | WEIGHT: 240 LBS | TEMPERATURE: 97.4 F

## 2022-09-23 DIAGNOSIS — M75.81 ROTATOR CUFF TENDINITIS, RIGHT: Primary | ICD-10-CM

## 2022-09-23 DIAGNOSIS — M19.011 GLENOHUMERAL ARTHRITIS, RIGHT: ICD-10-CM

## 2022-09-23 PROCEDURE — 20610 DRAIN/INJ JOINT/BURSA W/O US: CPT | Performed by: PHYSICIAN ASSISTANT

## 2022-09-23 PROCEDURE — 1123F ACP DISCUSS/DSCN MKR DOCD: CPT | Performed by: PHYSICIAN ASSISTANT

## 2022-09-23 PROCEDURE — 99203 OFFICE O/P NEW LOW 30 MIN: CPT | Performed by: PHYSICIAN ASSISTANT

## 2022-09-23 RX ORDER — LIDOCAINE HYDROCHLORIDE 10 MG/ML
8 INJECTION, SOLUTION INFILTRATION; PERINEURAL ONCE
Status: COMPLETED | OUTPATIENT
Start: 2022-09-23 | End: 2022-09-23

## 2022-09-23 RX ORDER — POTASSIUM CHLORIDE 750 MG/1
10 TABLET, EXTENDED RELEASE ORAL DAILY
COMMUNITY

## 2022-09-23 RX ORDER — TERAZOSIN 5 MG/1
5 CAPSULE ORAL DAILY
COMMUNITY

## 2022-09-23 RX ORDER — TRIAMCINOLONE ACETONIDE 40 MG/ML
80 INJECTION, SUSPENSION INTRA-ARTICULAR; INTRAMUSCULAR ONCE
Status: COMPLETED | OUTPATIENT
Start: 2022-09-23 | End: 2022-09-23

## 2022-09-23 RX ORDER — IRBESARTAN 150 MG/1
150 TABLET ORAL DAILY
COMMUNITY

## 2022-09-23 RX ADMIN — LIDOCAINE HYDROCHLORIDE 8 ML: 10 INJECTION, SOLUTION INFILTRATION; PERINEURAL at 12:10

## 2022-09-23 RX ADMIN — TRIAMCINOLONE ACETONIDE 80 MG: 40 INJECTION, SUSPENSION INTRA-ARTICULAR; INTRAMUSCULAR at 12:11

## 2022-09-23 NOTE — PROGRESS NOTES
Heron Julian (:  1945) is a 76 y.o. male,New patient, here for evaluation of the following chief complaint(s):  New Patient (Patient presents today for right shoulder pain. Pt states he has seen Odette Chen in the past for this issue and was sent to therapy for this. Pt states he has gone awhile without any pain and within the past 2 months it has been bothering him more. Pt feels the most pain when raises his arm. Pain scale: 7. Pt takes tylenol for pain. )         ASSESSMENT/PLAN:  1. Rotator cuff tendinitis, right  -     Ambulatory referral to Physical Therapy  -     AR ARTHROCENTESIS ASPIR&/INJ MAJOR JT/BURSA W/O US  2. Glenohumeral arthritis, right  -     Ambulatory referral to Physical Therapy  -     AR ARTHROCENTESIS ASPIR&/INJ MAJOR JT/BURSA W/O US    No follow-ups on file. Subjective   SUBJECTIVE/OBJECTIVE:  This is a 17-year-old right-hand-dominant male complaining of right shoulder pain. He cannot recall any specific injury however he is active. I have injected the shoulder previously he attended physical therapy which improved his symptoms of painful shoulder significantly. He presents today requesting cortisone injection in the right shoulder. Review of Systems   Constitutional: Negative. HENT: Negative. Respiratory: Negative. Skin: Negative. Neurological: Negative. Objective   Physical Exam  Musculoskeletal:      Comments: Right shoulder-no acromioclavicular, clavicle, SC joint tenderness with palpation. Abduction and abduction strength is slightly decreased in comparison to the left. Supraspinatus test elicits pain but no specific weakness. Liftoff is negative. Apprehension sign is negative. Internal rotation is 0 degrees, external rotation is 125 degrees about 5 degrees less than the left. Biceps contour is normal.  Sensation is intact distally to light touch. Capillary refill is brisk.      Shoulder Injection Procedure Note    Pre-operative Diagnosis:    Diagnosis Orders   1. Rotator cuff tendinitis, right  Ambulatory referral to Physical Therapy    AL ARTHROCENTESIS ASPIR&/INJ MAJOR JT/BURSA W/O US      2. Glenohumeral arthritis, right  Ambulatory referral to Physical Therapy    AL ARTHROCENTESIS ASPIR&/INJ MAJOR JT/BURSA W/O US           Post-operative Diagnosis:    Diagnosis Orders   1. Rotator cuff tendinitis, right  Ambulatory referral to Physical Therapy    AL ARTHROCENTESIS ASPIR&/INJ MAJOR JT/BURSA W/O US      2. Glenohumeral arthritis, right  Ambulatory referral to Physical Therapy    AL ARTHROCENTESIS ASPIR&/INJ MAJOR JT/BURSA W/O US           Indications: tendonitis    Anesthesia: Ethyl Chloride    Procedure Details     Verbal consent was obtained for the procedure. The  right shoulder was prepped with iodine and the skin was anesthetized. Using a 22 gauge needle the  is injected with 8 mL 1% lidocaine and 2 mL of triamcinolone (KENALOG) 40mg/ml under the posterior aspect of the acromion. The injection site was cleansed with topical isopropyl alcohol and a dressing was applied. Complications:  None; patient tolerated the procedure well. Explained the patient does have rotator cuff tendinitis. Injected the shoulder today with cortisone. Begin physical therapy. He may take over-the-counter anti-inflammatory medications as well as Tylenol. He may use anti-inflammatory creams. Will contact the office as his symptoms dictate. Follow-up as needed. On this date 9/23/2022 I have spent 35 minutes reviewing previous notes, test results and face to face with the patient discussing the diagnosis and importance of compliance with the treatment plan as well as documenting on the day of the visit. An electronic signature was used to authenticate this note.     --WALTER Angel

## 2022-09-25 ASSESSMENT — ENCOUNTER SYMPTOMS: RESPIRATORY NEGATIVE: 1

## 2022-10-13 ENCOUNTER — HOSPITAL ENCOUNTER (OUTPATIENT)
Dept: PHYSICAL THERAPY | Age: 77
Setting detail: THERAPIES SERIES
Discharge: HOME | End: 2022-10-13
Payer: COMMERCIAL

## 2022-10-13 PROCEDURE — 97110 THERAPEUTIC EXERCISES: CPT

## 2022-10-13 PROCEDURE — 97162 PT EVAL MOD COMPLEX 30 MIN: CPT

## 2022-10-13 ASSESSMENT — PAIN SCALES - GENERAL: PAINLEVEL_OUTOF10: 3

## 2022-10-13 ASSESSMENT — PAIN DESCRIPTION - ORIENTATION: ORIENTATION: RIGHT

## 2022-10-13 ASSESSMENT — PAIN DESCRIPTION - DESCRIPTORS: DESCRIPTORS: ACHING;SHARP

## 2022-10-13 ASSESSMENT — PAIN DESCRIPTION - LOCATION: LOCATION: SHOULDER

## 2022-10-13 ASSESSMENT — PAIN DESCRIPTION - PAIN TYPE: TYPE: CHRONIC PAIN

## 2022-10-13 NOTE — PROGRESS NOTES
[address]     Ph: [phone]  Fax: [phone]      [] Certification  [] Recertification [x]  Plan of Care  [] Progress Note [] Discharge      Referring Provider: WALTER Mccain Referring Provider (secondary): none   From:  Tru Quevedo, PT  Patient: Deborah Bruno (89 y.o. male) : 1945 Date: 10/13/2022  Medical Diagnosis: Rotator cuff tendinitis, right [M75.81]  Glenohumeral arthritis, right [M19.011] rotator cuff tendinitis, R; glenohumeral routine arthritis Diagnosis: rotator cuff tendinitis, R; glenohumeral routine arthritis   Treatment Diagnosis: decreased R shoulder A/PROM, decreased R strength, decreased R UE sensation, decreased posture, and decreased activity tolerance and increased pain with reaching and lifting    Plan of Care/Certification Expiration Date: :     Progress Report Period from:  10/13/2022  to 10/13/2022    Visits to Date: 1 No Show: 0 Cancelled Appts: 0    OBJECTIVE:   Short Term Goals - Time Frame for Short Term Goals: 3 wks    Goals Current/Discharge status  Status   Short Term Goal 1: Pt will demonstrate improved R shoulder AROM for ease with reaching and yard work. General AROM UE: Left WFL      AROM RUE (degrees)  R Shoulder Flexion 0-180: 180  R Shoulder Extension 0-45: 45  R Shoulder ABduction 0-180: 125  R Shoulder Int Rotation  0-70: T12  R Shoulder Ext Rotation 0-90: T1  R Should Horiz ABduction 0-40: 20          PROM RUE (degrees)  R Shoulder Int Rotation  0-70: 40  R Shoulder Ext Rotation  0-90: 70    New   Short Term Goal 2: Pt will demonstrate improved R shoulder strength >/= 4/5 for decreased pain with reaching and lifting for return to PLOF.      Strength RUE  R Shoulder Flexion: 4-/5  R Shoulder Extension: 4/5  R Shoulder ABduction: 3+/5  R Shoulder Internal Rotation: 4-/5  R Shoulder External Rotation: 4-/5  R Shoulder Horizontal ABduction: 3+/5  R Elbow Flexion: 4-/5  R Elbow Extension: 4+/5         General Strength Testing UE: Left WFL New     Long Term Goals - Time Frame for Long Term Goals : 6 wks  Goals Current/ Discharge status Status   Long Term Goal 1: Pt will demonstrate indep and 100% compliance with HEP for self management of symptoms. HEP initiated New   Long Term Goal 2: Pt will demonstrate improved score on UEFS >/= 50/80 for improved quality of life. UEFS 34/80   New   Long Term Goal 3: Pt will demonstrate decreased R shoulder pain </= 1/10 with reaching and lifting for return to PLOF. Pain Screening  Patient Currently in Pain: Yes  Pain Assessment: 0-10  Pain Level: 3  Best Pain Level: 3  Worst Pain Level: 8  Pain Type: Chronic pain  Pain Location: Shoulder  Pain Orientation: Right  Pain Radiating Towards: intermittently R UE to hand  Pain Descriptors: Aching, Sharp  Pain Management/Relieving Factors: Rest, Medications (tylenol)   New     Body Structures, Functions, Activity Limitations Requiring Skilled Therapeutic Intervention: Decreased functional mobility , Decreased ROM, Decreased strength, Decreased endurance, Decreased sensation, Increased pain, Decreased posture  Assessment: Pt is 76 y.o. male with h/o R shoulder pain which has worsened since summer 2021. Pt received recent injection and was recently dx with OA and rotator cuff tendinitis R UE. Pt exhibits impairments including decreased R shoulder A/PROM, decreased R strength, decreased R UE sensation, decreased posture, and decreased activity tolerance and increased pain with reaching and lifting. Pt requires continued PT to address these impairments, progress strength and ROM, and provide pt with HEP for self management of pt pain for return to PLOF.   Therapy Prognosis: Good    PT Education: Goals;PT Role;Plan of Care;Evaluative findings;Home Exercise Program;Insurance    PLAN: [x] Evaluate and Treat  Frequency/Duration:  Plan Frequency: 2xs/wk  Plan weeks: 6  Current Treatment Recommendations: Strengthening, ROM, Functional mobility training, Endurance training, Neuromuscular re-education, Manual Therapy - Soft Tissue Mobilization, Pain management, Home exercise program, Safety education & training, Patient/Caregiver education & training, Equipment evaluation, education, & procurement, Modalities, Positioning, Therapeutic activities  Additional Comments: transfer POC to Josias Collado PT                     Patient Status:[x] Continue/ Initiate plan of Care    [] Discharge PT. Recommend pt continue with HEP. [] Additional visits requested, Please re-certify for additional visits:    [] Hold         Signature: Electronically signed by Glenys Jean PT on 10/13/22 at 3:51 PM EDT      If you have any questions or concerns, please don't hesitate to call. Thank you for your referral.    I have reviewed this plan of care and certify a need for medically necessary rehabilitation services.     Physician Signature:__________________________________________________________  Date:  Please sign and return

## 2022-10-13 NOTE — PROGRESS NOTES
[facility]  PHYSICAL THERAPY EVALUATION    Physical Therapy: Initial Evaluation    Patient: Daniel Lilly (4 y.o.     male)   Examination Date: 10/13/2022   :  1945 ;    ConfirmedFranky Layne MRN: [id number]  CSN: [id number]   Insurance: Payor: [employer] / Plan: [employer] [address] / Product Type: *No Product type* /   Insurance ID: [id number] - (Commercial) Secondary Insurance (if applicable):    Referring Physician: WALTER Carbajal  none    Visits to Date/Visits Approved:  (BMN)    No Show/Cancelled Appts: 0      Medical Diagnosis: Rotator cuff tendinitis, right [M75.81]  Glenohumeral arthritis, right [M19.011] rotator cuff tendinitis, R; glenohumeral routine arthritis  Diagnosis: rotator cuff tendinitis, R; glenohumeral routine arthritis   Treatment Diagnosis: decreased R shoulder A/PROM, decreased R strength, decreased R UE sensation, decreased posture, and decreased activity tolerance and increased pain with reaching and lifting     PERTINENT MEDICAL HISTORY   Patient Assessed for Rehabilitation Services: Yes       Medical History:     Past Medical History:   Diagnosis Date    A-fib ([facility] [address]     Acid reflux     Arthritis     Back pain     CAD (coronary artery disease)     Chicken pox     H/O bronchitis     H/O hemorrhoids     H/O: pneumonia     Heart disease     High blood pressure     Hyperlipidemia     Measles     Numbness     Shortness of breath      Surgical History:   Past Surgical History:   Procedure Laterality Date    BACK SURGERY      CORONARY ANGIOPLASTY WITH STENT PLACEMENT      LUMBAR LAMINECTOMY      NM COLONOSCOPY FLX DX W/COLLJ SPEC WHEN PFRMD N/A 2018    COLONOSCOPY performed by Isabel Sanches MD at [address] [place] [address] ESOPHAGOGASTRODUODENOSCOPY TRANSORAL DIAGNOSTIC N/A 2018    EGD ESOPHAGOGASTRODUODENOSCOPY performed by Isabel Sanches MD at [facility]       Medications:   Current Outpatient Medications:     irbesartan (AVAPRO) 150 MG tablet, Take 150 tablets by mouth daily, Disp: , Rfl:     potassium chloride (KLOR-CON M) 10 MEQ extended release tablet, Take 10 tablets by mouth daily, Disp: , Rfl:     terazosin (HYTRIN) 5 MG capsule, Take 5 tablets by mouth daily, Disp: , Rfl:     Lactobacillus (PROBIOTIC ACIDOPHILUS PO), Take by mouth, Disp: , Rfl:     omeprazole (PRILOSEC) 20 MG delayed release capsule, Take 1 capsule by mouth 2 times daily (before meals) for 7 days, Disp: 14 capsule, Rfl: 0    tadalafil (CIALIS) 20 MG tablet, Take 20 mg by mouth as needed for Erectile Dysfunction, Disp: , Rfl:     ranolazine (RANEXA) 500 MG extended release tablet, Take 500 mg by mouth 2 times daily, Disp: , Rfl:     Vitamin E 200 units TABS, Take 1 tablet by mouth daily, Disp: , Rfl:     Ascorbic Acid (VITAMIN C) 500 MG CAPS, Take 1 tablet by mouth daily, Disp: , Rfl:     Cholecalciferol (VITAMIN D3) 5000 units TABS, Take 5,000 Units by mouth daily, Disp: , Rfl:     rivaroxaban (XARELTO) 20 MG TABS tablet, Take 20 mg by mouth daily (with breakfast) , Disp: , Rfl:     primidone (MYSOLINE) 50 MG tablet, Take 50 mg by mouth daily , Disp: , Rfl:     tamsulosin (FLOMAX) 0.4 MG capsule, Take 1 capsule by mouth daily for 10 days, Disp: 10 capsule, Rfl: 0    Handicap Placard MISC, by Does not apply route, Disp: 1 each, Rfl: 0    aspirin 81 MG tablet, Take 325 mg by mouth, Disp: , Rfl:     diclofenac (VOLTAREN) 75 MG EC tablet, Take 75 mg by mouth 2 times daily , Disp: , Rfl:     atorvastatin (LIPITOR) 40 MG tablet, Take by mouth, Disp: , Rfl:     Omega-3 Fatty Acids (FISH OIL) 1200 MG CPDR, Take 1 capsule by mouth, Disp: , Rfl:     finasteride (PROSCAR) 5 MG tablet, Take 2.5 mg by mouth daily , Disp: , Rfl:     metoprolol (TOPROL-XL) 50 MG XL tablet, Take 25 mg by mouth daily , Disp: , Rfl:     terazosin (HYTRIN) 5 MG capsule,   , Disp: , Rfl:     NUTRITIONAL SUPPLEMENTS PO, Take by mouth, Disp: , Rfl:   Allergies: Patient has no known allergies. SUBJECTIVE EXAMINATION     History obtained from[de-identified] Patient,      Family/Caregiver Present: No    Subjective History:    Subjective: Pt to PT due to R shoulder pain which began last summer and has been worsening. Pt dx with R rotator cuff tendinitis and OA. Pt states increased pain with reaching behind and to side of self, lifting, pulling objects to pt. Pt stated he has stopped doing movements that cause increased pain. Pt with significant difficulty sleeping due to increased pain and takes 3 tylenol at night. Pt had cortizone shot approx 2-3 weeks ago. - only decreased pain minimally.   Additional Pertinent Hx (if applicable): OA, chronic pain, TKA, back surgery, high blood pressure, bronchitis, hearing loss   Comment: RTD in Nov     Pain Screening    Pain Screening  Patient Currently in Pain: Yes  Pain Assessment: 0-10  Pain Level: 3  Best Pain Level: 3  Worst Pain Level: 8  Pain Type: Chronic pain  Pain Location: Shoulder  Pain Orientation: Right  Pain Radiating Towards: intermittently R UE to hand  Pain Descriptors: Aching, Sharp  Pain Management/Relieving Factors: Rest, Medications (tylenol)    Functional Status    Social History:    Social History  Lives With: Spouse  Type of Home: House  Home Layout: Two level  Home Access: Stairs to enter without rails  Entrance Stairs - Number of Steps: 1    Occupation/Interests:   Occupation: Retired, Full time employment  Type of Occupation: teacher; currently realtor    Prior Level of Function:     Independent     Current Level of Function:   ADL Assistance: Independent  Homemaking Assistance: Independent  Homemaking Responsibilities: Yes  Ambulation Assistance: Independent  Transfer Assistance: Independent  Active : Yes    Dominant Hand: : Right    OBJECTIVE EXAMINATION   Regional Screen:    Cervical Screen: no increased pain in cervical spine with cervical AROM  Elbow/Forearm Screen: WFL    Observations:   General Observations  Posture:  (head forward)    Palpation:   Right Shoulder Palpation: no pain with palpation    Mobility:   Ambulation  Device: No Device  Assistance: Independent    Neuro Screen: Sensation  Overall Sensation Status: WFL    Left AROM  Right AROM      General AROM UE: Left WFL    General AROM UE: Left WFL  AROM RUE (degrees)  R Shoulder Flexion 0-180: 180  R Shoulder Extension 0-45: 45  R Shoulder ABduction 0-180: 125  R Shoulder Int Rotation  0-70: T12  R Shoulder Ext Rotation 0-90: T1  R Should Horiz ABduction 0-40: 20   General AROM UE: Left WFL    Left PROM  Right PROM          WFL    PROM RUE (degrees)  R Shoulder Int Rotation  0-70: 40  R Shoulder Ext Rotation  0-90: 70        Left Strength  Right Strength      General Strength Testing UE: Left WFL    General Strength Testing UE: Left WFL  Strength RUE  R Shoulder Flexion: 4-/5  R Shoulder Extension: 4/5  R Shoulder ABduction: 3+/5  R Shoulder Internal Rotation: 4-/5  R Shoulder External Rotation: 4-/5  R Shoulder Horizontal ABduction: 3+/5  R Elbow Flexion: 4-/5  R Elbow Extension: 4+/5     Special Tests:   Special Tests:  (R Shoulder  Gan-Cameron(-), Speeds(-), Empty can(-), Drop arm (-), Madiha(-), Neer's(-), Natanael's lift off(-), AC compression(-), Clunk(-), Apprehension(-),)    Outcomes Score:  Exam: UEFS 34/80    Treatment:  Exercises:   Exercises  Exercise 1: scapular retraction X 10  Exercise 2: chin tuck X 10- cues for technique  Exercise 3: wall slide abd X 10  Exercise 4: rows/lats*  Exercise 5: chest pull*  Exercise 6: cane*  Exercise 7: PNF*  Exercise 8: AROM with TB*  Exercise 9: pulley*  Exercise 10: UBE*  Exercise 20: HEP: scapular retraction, chin tuck, abd wall slide     Modalities:  Modalities:  (ice*, estim*)     Manual:  Manual Therapy  Joint Mobilization: *  Soft Tissue Mobilizaton: *  Other: kinesio tape*- no allergy to adhesive per pt report  *Indicates exercise,modality, or manual techniques to be initiated when appropriate       ASSESSMENT     Impression: Assessment: Pt is 76 y.o. male with h/o R shoulder pain which has worsened since summer 2021. Pt received recent injection and was recently dx with OA and rotator cuff tendinitis R UE. Pt exhibits impairments including decreased R shoulder A/PROM, decreased R strength, decreased R UE sensation, decreased posture, and decreased activity tolerance and increased pain with reaching and lifting. Pt requires continued PT to address these impairments, progress strength and ROM, and provide pt with HEP for self management of pt pain for return to PLOF. Body Structures, Functions, Activity Limitations Requiring Skilled Therapeutic Intervention: Decreased functional mobility , Decreased ROM, Decreased strength, Decreased endurance, Decreased sensation, Increased pain, Decreased posture    Statement of Medical Necessity: Physical Therapy is both indicated and medically necessary as outlined in the POC to increase the likelihood of meeting the functionally related goals stated below. Patient's Activity Tolerance: Patient limited by pain      Patient's rehabilitation potential/prognosis is considered to be: Good    Factors which may impact rehabilitation potential include: None  Measures taken to address barrier(s): N/A  Patient Education: Goals, PT Role, Plan of Care, Evaluative findings, Home Exercise Program, Insurance      GOALS   Patient Goal(s): Patient Goals : \"releave shoulder pain\"    Short Term Goals Completed by 3 wks Goal Status   Pt will demonstrate improved R shoulder AROM for ease with reaching and yard work. New   Pt will demonstrate improved R shoulder strength >/= 4/5 for decreased pain with reaching and lifting for return to PLOF. New     Long Term Goals Completed by 6 wks Goal Status   LTG 1 Pt will demonstrate indep and 100% compliance with HEP for self management of symptoms.  New   LTG 2 Pt will demonstrate improved score on UEFS >/= 50/80 for improved quality of life. New   LTG 3 Pt will demonstrate decreased R shoulder pain </= 1/10 with reaching and lifting for return to PLOF. New        TREATMENT PLAN       Requires PT Follow-Up: Yes    Treatment may include any combination of the following: Strengthening, ROM, Functional mobility training, Endurance training, Neuromuscular re-education, Manual Therapy - Soft Tissue Mobilization, Pain management, Home exercise program, Safety education & training, Patient/Caregiver education & training, Equipment evaluation, education, & procurement, Modalities, Positioning, Therapeutic activities     Frequency / Duration:  Patient to be seen 2xs/wk times per week for 6 weeks  Plan Comment:    transfer POC to [facility], PT          Eval Complexity:   Decision Making: Medium Complexity  History: Personal Factors and/or Comorbidities Impacting POC: High  History: OA, chronic pain, TKA, back surgery, high blood pressure, bronchitis, hearing loss  Examination of body system(s) including body structures and functions, activity limitations, and/or participation restrictions: High  Exam: UEFS 34/80  Clinical Presentation: Medium  Clinical Presentation: evloving    POST-PAIN     Pain Rating (0-10 pain scale):  5 /10  Location and pain description same as pre-treatment unless indicated. Action: [] NA  [] Call Physician  [x] Perform HEP  [x] Meds as prescribed    Evaluation and patient rights have been reviewed and patient agrees with plan of care.   Yes  [x]  No  []   Explain:     Katherine Fall Risk Assessment  Risk Factor Scale  Score   History of Falls [] Yes  [x] No 25  0 0   Secondary Diagnosis [] Yes  [x] No 15  0 0   Ambulatory Aid [] Furniture  [] Crutches/cane/walker  [x] None/bedrest/wheelchair/nurse 30  15  0 0   IV/Heparin Lock [] Yes  [] No 20  0 0   Gait/Transferring [] Impaired  [] Weak  [x] Normal/bedrest/immobile 20  10  0 0   Mental Status [] Forgets limitations  [x] Oriented to own ability 15  0 0      Total:0 Based on the Assessment score: check the appropriate box.   [x]  No intervention needed   Low =   Score of 0-24  []  Use standard prevention interventions Moderate =  Score of 24-44   [] Discuss fall prevention strategies   [] Indicate moderate falls risk on eval  []  Use high risk prevention interventions High = Score of 45 and higher   [] Discuss fall prevention strategies   [] Provide supervision during treatment time      Minutes:  PT Individual Minutes  Time In: 1503  Time Out: 1559  Minutes: 56  Timed Code Treatment Minutes: 11 Minutes  Procedure Minutes: eval X 45 min     Timed Activity Minutes Units   Ther Ex 11 1   Electronically signed by Charlie Benton PT on 10/13/22 at 3:46 PM EDT

## 2022-10-17 ENCOUNTER — HOSPITAL ENCOUNTER (OUTPATIENT)
Dept: PHYSICAL THERAPY | Age: 77
Setting detail: THERAPIES SERIES
Discharge: HOME | End: 2022-10-17
Payer: COMMERCIAL

## 2022-10-17 PROCEDURE — 97110 THERAPEUTIC EXERCISES: CPT

## 2022-10-17 ASSESSMENT — PAIN DESCRIPTION - LOCATION: LOCATION: SHOULDER

## 2022-10-17 ASSESSMENT — PAIN DESCRIPTION - ORIENTATION: ORIENTATION: RIGHT

## 2022-10-17 ASSESSMENT — PAIN SCALES - GENERAL: PAINLEVEL_OUTOF10: 2

## 2022-10-17 ASSESSMENT — PAIN DESCRIPTION - PAIN TYPE: TYPE: CHRONIC PAIN

## 2022-10-17 ASSESSMENT — PAIN DESCRIPTION - DESCRIPTORS: DESCRIPTORS: ACHING

## 2022-10-17 NOTE — PROGRESS NOTES
[facility]  Outpatient Physical Therapy    Treatment Note        Date: 10/17/2022  Patient: Cody Adorno  : 1945   Confirmed: Yes  MRN: [id number]  Referring Provider: WALTER Dunn    Medical Diagnosis: Rotator cuff tendinitis, right [M75.81]  Glenohumeral arthritis, right [M19.011] rotator cuff tendinitis, R; glenohumeral routine arthritis Diagnosis: rotator cuff tendinitis, R; glenohumeral routine arthritis   Treatment Diagnosis: decreased R shoulder A/PROM, decreased R strength, decreased R UE sensation, decreased posture, and decreased activity tolerance and increased pain with reaching and lifting    Visit Information:  Insurance: Payor: [employer] / Plan: [employer] [address] / Product Type: *No Product type* /   PT Visit Information  Total # of Visits to Date: 2  No Show: 0  Canceled Appointment: 0  Progress Note Counter:     Subjective Information:  Subjective: Pt states \"Not too bad but if I stress it, it tells me\" in reference to Rt shldr. Pt reports doing exs provided at [facility].  HEP Compliance:  [x] Good [] Fair [] Poor [] Reports not doing due to:    Pain Screening  Patient Currently in Pain: Yes  Pain Assessment: 0-10  Pain Level: 2 (2-3 w/ light activity)  Pain Type: Chronic pain  Pain Location: Shoulder  Pain Orientation: Right  Pain Descriptors: Aching    Treatment:  Exercises:  Exercises  Exercise 2: IR stretch w/ strap 3x30\"  Exercise 3: wall slide abd X 10  Exercise 4: rows/lats 2x10 ea RTB  Exercise 5: chest pull*  Exercise 6: cane ER 5\"x10  Exercise 7: PNF*  Exercise 8: AROM with TB*  Exercise 9: pulley flex/ABD 2 min ea  Exercise 10: UBE L1 2.5 F/R  Exercise 20: HEP: rows/lats w/ RTB, IR stretch    Modalities:  Pt declined     Objective Measures:      Strength: [x] NT  [] MMT completed:     ROM: [x] NT  [] ROM measurements:     Assessment:    Body Structures, Functions, Activity Limitations Requiring Skilled Therapeutic Intervention: Decreased functional mobility , Decreased ROM, Decreased strength, Decreased endurance, Decreased sensation, Increased pain, Decreased posture  Assessment: Initiated PT program per POC w/ focus on improving areas of Rt shldr ROM and strength to ease daily activity tolerance. Pt remains most limited into ABD plane of mvmt however, does verbalize pain and weakness at end range flexion upon over head reaching. Good marlon to all exs introduced without further increase from initial pain and declining need for CP post tx. HEP provided. Pt has goal of \"pain free\" use of Rt UE as pt is Rt hand dominant w/ most tasks. Pt enjoys walking dog and gardening. Treatment Diagnosis: decreased R shoulder A/PROM, decreased R strength, decreased R UE sensation, decreased posture, and decreased activity tolerance and increased pain with reaching and lifting  Therapy Prognosis: Good     Post-Pain Assessment:       Pain Rating (0-10 pain scale):   2/10   Location and pain description same as pre-treatment unless indicated. Action: [] NA   [x] Perform HEP  [] Meds as prescribed  [x] Modalities as prescribed   [] Call Physician     GOALS   Patient Goal(s): Patient Goals : \"releave shoulder pain\"    Short Term Goals Completed by 3 wks Goal Status   STG 1 Pt will demonstrate improved R shoulder AROM for ease with reaching and yard work. In progress   STG 2 Pt will demonstrate improved R shoulder strength >/= 4/5 for decreased pain with reaching and lifting for return to PLOF. Long Term Goals Completed by 6 wks Goal Status   LTG 1 Pt will demonstrate indep and 100% compliance with HEP for self management of symptoms. In progress   LTG 2 Pt will demonstrate improved score on UEFS >/= 50/80 for improved quality of life. In progress   LTG 3 Pt will demonstrate decreased R shoulder pain </= 1/10 with reaching and lifting for return to PLOF.  In progress     Plan:  Frequency/Duration:  Plan  Plan Frequency: 2xs/wk  Plan weeks: 6  Current Treatment Recommendations: Strengthening, ROM, Functional mobility training, Endurance training, Neuromuscular re-education, Manual Therapy - Soft Tissue Mobilization, Pain management, Home exercise program, Safety education & training, Patient/Caregiver education & training, Equipment evaluation, education, & procurement, Modalities, Positioning, Therapeutic activities  Additional Comments: transfer POC to [facility], PT  Pt to continue current HEP. See objective section for any therapeutic exercise changes, additions or modifications this date.     Therapy Time:      PT Individual Minutes  Time In: 0438  Time Out: 2898  Minutes: 40  Timed Code Treatment Minutes: 40 Minutes  Procedure Minutes: N/A   Timed Activity Minutes Units   Ther Ex 40 3     Electronically signed by Norm Olmstead PTA on 10/17/22 at 3:21 PM EDT

## 2022-10-18 ENCOUNTER — HOSPITAL ENCOUNTER (OUTPATIENT)
Dept: PHYSICAL THERAPY | Age: 77
Setting detail: THERAPIES SERIES
Discharge: HOME | End: 2022-10-18
Payer: COMMERCIAL

## 2022-10-18 PROCEDURE — 97110 THERAPEUTIC EXERCISES: CPT

## 2022-10-18 ASSESSMENT — PAIN DESCRIPTION - ORIENTATION: ORIENTATION: RIGHT

## 2022-10-18 ASSESSMENT — PAIN DESCRIPTION - DESCRIPTORS: DESCRIPTORS: ACHING

## 2022-10-18 ASSESSMENT — PAIN DESCRIPTION - LOCATION: LOCATION: SHOULDER

## 2022-10-18 ASSESSMENT — PAIN DESCRIPTION - PAIN TYPE: TYPE: CHRONIC PAIN

## 2022-10-18 ASSESSMENT — PAIN SCALES - GENERAL: PAINLEVEL_OUTOF10: 3

## 2022-10-18 NOTE — PROGRESS NOTES
[facility]  Outpatient Physical Therapy    Treatment Note        Date: 10/18/2022  Patient: Stone Anaya  : 1945   Confirmed: Yes  MRN: [id number]  Referring Provider: WALTER Mckeon    Medical Diagnosis: Rotator cuff tendinitis, right [M75.81]  Glenohumeral arthritis, right [M19.011] rotator cuff tendinitis, R; glenohumeral routine arthritis Diagnosis: rotator cuff tendinitis, R; glenohumeral routine arthritis   Treatment Diagnosis: decreased R shoulder A/PROM, decreased R strength, decreased R UE sensation, decreased posture, and decreased activity tolerance and increased pain with reaching and lifting    Visit Information:  Insurance: Payor: [employer] / Plan: [employer] [address] / Product Type: *No Product type* /   PT Visit Information  Total # of Visits to Date: 3  No Show: 0  Canceled Appointment: 0  Progress Note Counter: 3/12    Subjective Information:  Subjective: Pt arriving to appt 20 min late D/T misundeerstanding of appt time. Pt states \"I'm a little more sore than I was but it's not too bad. \" Pt reports all exs completed yesterday during PT to be tolerable.   HEP Compliance:  [x] Good [] Fair [] Poor [] Reports not doing due to:    Pain Screening  Patient Currently in Pain: Yes  Pain Assessment: 0-10  Pain Level: 3 (3-4)  Pain Type: Chronic pain  Pain Location: Shoulder  Pain Orientation: Right  Pain Descriptors: Aching    Treatment:  Exercises:  Exercises  Exercise 1: Corner stretch 3x30\"  Exercise 2: IR stretch w/ strap 3x30\"  Exercise 3: wall slide abd 5\" X 10  Exercise 5: chest pull*  Exercise 6: cane ER 5\"x10  Exercise 7: Ball stabs x15 ea (4 way), PNF*  Exercise 8: AROM with TB ([employer]) ER/IR, ADD, ext RTB; flex/ABD YTB x10 ea  Exercise 9: pulleys- discontinued D/T max ROM achieved  Exercise 10: UBE L1.5 2.5 F/R  Exercise 20: HEP: corner stretch; TB shldr exs    Modalities:   Pt declined     Objective Measures:      Strength: [x] NT  [] MMT completed: ROM: [] NT  [x] ROM measurements:   AROM RUE (degrees)  R Shoulder ABduction 0-180: 134 deg  R Shoulder Int Rotation  0-70: T10  R Shoulder Ext Rotation 0-90: T3      Assessment: Body Structures, Functions, Activity Limitations Requiring Skilled Therapeutic Intervention: Decreased functional mobility , Decreased ROM, Decreased strength, Decreased endurance, Decreased sensation, Increased pain, Decreased posture  Assessment: Continuation of exs initiated LV as well as addition of TB shldr strengthening in all planes for increased functional use of RUE. Good marlon w/o compliant. HEP provided. Rt shldr AROM noted to be improved in all planes since evaluation last week. Able to accommodate pt for late arrival today. Treatment Diagnosis: decreased R shoulder A/PROM, decreased R strength, decreased R UE sensation, decreased posture, and decreased activity tolerance and increased pain with reaching and lifting    Post-Pain Assessment:       Pain Rating (0-10 pain scale):   3/10   Location and pain description same as pre-treatment unless indicated. Action: [] NA   [x] Perform HEP  [] Meds as prescribed  [x] Modalities as prescribed   [] Call Physician     GOALS   Patient Goal(s): Patient Goals : \"releave shoulder pain\"    Short Term Goals Completed by 3 wks Goal Status   STG 1 Pt will demonstrate improved R shoulder AROM for ease with reaching and yard work. In progress   STG 2 Pt will demonstrate improved R shoulder strength >/= 4/5 for decreased pain with reaching and lifting for return to PLOF. Long Term Goals Completed by 6 wks Goal Status   LTG 1 Pt will demonstrate indep and 100% compliance with HEP for self management of symptoms. In progress   LTG 2 Pt will demonstrate improved score on UEFS >/= 50/80 for improved quality of life. In progress   LTG 3 Pt will demonstrate decreased R shoulder pain </= 1/10 with reaching and lifting for return to PLOF.  In progress     Plan:  Frequency/Duration:  Plan  Plan Frequency: 2xs/wk  Plan weeks: 6  Current Treatment Recommendations: Strengthening, ROM, Functional mobility training, Endurance training, Neuromuscular re-education, Manual Therapy - Soft Tissue Mobilization, Pain management, Home exercise program, Safety education & training, Patient/Caregiver education & training, Equipment evaluation, education, & procurement, Modalities, Positioning, Therapeutic activities  Additional Comments: transfer POC to Benjamin Harden, PT  Pt to continue current HEP. See objective section for any therapeutic exercise changes, additions or modifications this date.     Therapy Time:      PT Individual Minutes  Time In: 5484  Time Out: 2637  Minutes: 38  Timed Code Treatment Minutes: 38 Minutes  Procedure Minutes: N/A   Timed Activity Minutes Units   Ther Ex 38 3     Electronically signed by Yany Crawley PTA on 10/18/22 at 11:22 AM EDT

## 2022-10-24 ENCOUNTER — HOSPITAL ENCOUNTER (OUTPATIENT)
Dept: PHYSICAL THERAPY | Age: 77
Setting detail: THERAPIES SERIES
Discharge: HOME | End: 2022-10-24
Payer: COMMERCIAL

## 2022-10-24 PROCEDURE — 97110 THERAPEUTIC EXERCISES: CPT

## 2022-10-24 ASSESSMENT — PAIN DESCRIPTION - DESCRIPTORS: DESCRIPTORS: TIGHTNESS

## 2022-10-24 ASSESSMENT — PAIN SCALES - GENERAL: PAINLEVEL_OUTOF10: 1

## 2022-10-24 ASSESSMENT — PAIN DESCRIPTION - LOCATION: LOCATION: BACK

## 2022-10-24 ASSESSMENT — PAIN DESCRIPTION - ORIENTATION: ORIENTATION: RIGHT

## 2022-10-24 NOTE — PROGRESS NOTES
, Decreased ROM, Decreased strength, Decreased endurance, Decreased sensation, Increased pain, Decreased posture  Assessment: Initiated overhead ball toss to self to increase UE strength and endurance. Mild discomfort with Tband shoulder abduction, improves when range is slightly decreased. Education on proper body mechanics while completing yard work such as utilizing back muscles to facilitate rowing technique vs shoulder ER to start leaf blower. Treatment Diagnosis: decreased R shoulder A/PROM, decreased R strength, decreased R UE sensation, decreased posture, and decreased activity tolerance and increased pain with reaching and lifting  Therapy Prognosis: Good          Post-Pain Assessment:       Pain Rating (0-10 pain scale):   0/10   Location and pain description same as pre-treatment unless indicated. Action: [x] NA   [] Perform HEP  [] Meds as prescribed  [] Modalities as prescribed   [] Call Physician     GOALS   Patient Goal(s): Patient Goals : \"releave shoulder pain\"    Short Term Goals Completed by 3 wks Goal Status   STG 1 Pt will demonstrate improved R shoulder AROM for ease with reaching and yard work. In progress   STG 2 Pt will demonstrate improved R shoulder strength >/= 4/5 for decreased pain with reaching and lifting for return to PLOF. Long Term Goals Completed by 6 wks Goal Status   LTG 1 Pt will demonstrate indep and 100% compliance with HEP for self management of symptoms. In progress   LTG 2 Pt will demonstrate improved score on UEFS >/= 50/80 for improved quality of life. In progress   LTG 3 Pt will demonstrate decreased R shoulder pain </= 1/10 with reaching and lifting for return to PLOF.  In progress     Plan:  Frequency/Duration:  Plan  Plan Frequency: 2xs/wk  Plan weeks: 6  Current Treatment Recommendations: Strengthening, ROM, Functional mobility training, Endurance training, Neuromuscular re-education, Manual Therapy - Soft Tissue Mobilization, Pain management, Home exercise program, Safety education & training, Patient/Caregiver education & training, Equipment evaluation, education, & procurement, Modalities, Positioning, Therapeutic activities  Additional Comments: transfer POC to [facility], PT  Pt to continue current HEP. See objective section for any therapeutic exercise changes, additions or modifications this date.     Therapy Time:      PT Individual Minutes  Time In: 5771  Time Out: 9104  Minutes: 43  Timed Code Treatment Minutes: 43 Minutes  Procedure Minutes:0  Timed Activity Minutes Units   Ther Ex 43 3     Electronically signed by Kelle Leary PTA on 10/24/22 at 12:20 PM EDT

## 2022-10-27 ENCOUNTER — HOSPITAL ENCOUNTER (OUTPATIENT)
Dept: PHYSICAL THERAPY | Age: 77
Setting detail: THERAPIES SERIES
Discharge: HOME | End: 2022-10-27
Payer: COMMERCIAL

## 2022-10-27 PROCEDURE — 97110 THERAPEUTIC EXERCISES: CPT

## 2022-10-27 ASSESSMENT — PAIN DESCRIPTION - ORIENTATION: ORIENTATION: RIGHT

## 2022-10-27 ASSESSMENT — PAIN SCALES - GENERAL: PAINLEVEL_OUTOF10: 2

## 2022-10-27 ASSESSMENT — PAIN DESCRIPTION - DESCRIPTORS: DESCRIPTORS: ACHING

## 2022-10-27 ASSESSMENT — PAIN DESCRIPTION - LOCATION: LOCATION: BACK;SHOULDER

## 2022-10-27 NOTE — PROGRESS NOTES
[address] [facility]  Outpatient Physical Therapy    Treatment Note        Date: 10/27/2022  Patient: Catina Browning  : 1945   Confirmed: Yes  MRN: [id number]  Referring Provider: WALTER Christian   Secondary Referring Provider (If applicable):     Medical Diagnosis: Rotator cuff tendinitis, right [M75.81]  Glenohumeral arthritis, right [M19.011]    Treatment Diagnosis: decreased R shoulder A/PROM, decreased R strength, decreased R UE sensation, decreased posture, and decreased activity tolerance and increased pain with reaching and lifting    Visit Information:  Insurance: Payor: [employer] / Plan: [employer] [address] / Product Type: *No Product type* /   PT Visit Information  Total # of Visits to Date: 5  No Show: 0  Canceled Appointment: 0  Progress Note Counter:     Subjective Information:  Subjective: Patient reports minimal Rt shoulder soreness today. HEP Compliance:  [x] Good [] Fair [] Poor [] Reports not doing due to:    Pain Screening  Patient Currently in Pain: Yes  Pain Level: 2  Pain Location: Back, Shoulder  Pain Orientation: Right  Pain Descriptors: Aching    Treatment:  Exercises:  Exercises  Exercise 1: Doorway str 3/30 sec  Exercise 2: IR stretch w/ strap 3x30\"  Exercise 3: wall slide abd 5\" X 10  Exercise 4: rows/lats x10 ea RTB  Exercise 5: Chest pulls (3way) RTB x10  Exercise 6: Cane: Rt abduction x10 (min discomfort)  Exercise 7: Ball stabs x15 ea (4 way), PNF*  Exercise 8: Owen ER RTB x10  Exercise 10: UBE L2 2.5 F/R  Exercise 11: Overhead ball toss to self (small ball) 45'' - SBA due to unsteadiness  Exercise 12:  Wall push ups x10  Exercise 13: SA wall slides YTB 3'' x10  Exercise 20: HEP: Ball stabs      *Indicates exercise, modality, or manual techniques to be initiated when appropriate    Objective Measures:           Strength: [] NT  [x] MMT completed:        Strength RUE  R Shoulder Flexion: 4/5  R Shoulder Extension: 4+/5  R Shoulder ABduction: 3+/5  R Shoulder Internal Rotation: 4-/5  R Shoulder External Rotation: 4/5                    Assessment: Body Structures, Functions, Activity Limitations Requiring Skilled Therapeutic Intervention: Decreased functional mobility , Decreased ROM, Decreased strength, Decreased endurance, Decreased sensation, Increased pain, Decreased posture  Assessment: Patient able to complete overhead ball toss to self fof 45'' vs 32'' suggesting improving UE endurance. Increase in Rt shoulder strength through MMT with patient reporting min improvement since beginning PT. Continued to progress such as with SA wall slides to improve parascapular strength and stability. Patient would like to continue with PT until F/U with referring practitioner. Treatment Diagnosis: decreased R shoulder A/PROM, decreased R strength, decreased R UE sensation, decreased posture, and decreased activity tolerance and increased pain with reaching and lifting  Therapy Prognosis: Good          Post-Pain Assessment:       Pain Rating (0-10 pain scale):   2/10   Location and pain description same as pre-treatment unless indicated. Action: [] NA   [] Perform HEP  [] Meds as prescribed  [x] Modalities as prescribed   [] Call Physician     GOALS   Patient Goal(s): Patient Goals : \"releave shoulder pain\"    Short Term Goals Completed by 3 wks Goal Status   STG 1 Pt will demonstrate improved R shoulder AROM for ease with reaching and yard work. In progress   STG 2 Pt will demonstrate improved R shoulder strength >/= 4/5 for decreased pain with reaching and lifting for return to PLOF. Long Term Goals Completed by 6 wks Goal Status   LTG 1 Pt will demonstrate indep and 100% compliance with HEP for self management of symptoms. In progress   LTG 2 Pt will demonstrate improved score on UEFS >/= 50/80 for improved quality of life.  In progress   LTG 3 Pt will demonstrate decreased R shoulder pain </= 1/10 with reaching and lifting for return to PLOF. In progress     Plan:  Frequency/Duration:  Plan  Plan Frequency: 2xs/wk  Plan weeks: 6  Current Treatment Recommendations: Strengthening, ROM, Functional mobility training, Endurance training, Neuromuscular re-education, Manual Therapy - Soft Tissue Mobilization, Pain management, Home exercise program, Safety education & training, Patient/Caregiver education & training, Equipment evaluation, education, & procurement, Modalities, Positioning, Therapeutic activities  Additional Comments: transfer POC to [facility], PT  Pt to continue current HEP. See objective section for any therapeutic exercise changes, additions or modifications this date.     Therapy Time:      PT Individual Minutes  Time In: 2504  Time Out: 1430  Minutes: 43  Timed Code Treatment Minutes: 43 Minutes  Procedure Minutes:0  Timed Activity Minutes Units   Ther Ex 43 3     Electronically signed by Osei Mata PTA on 10/27/22 at 11:38 AM EDT

## 2022-10-31 ENCOUNTER — HOSPITAL ENCOUNTER (OUTPATIENT)
Dept: PHYSICAL THERAPY | Age: 77
Setting detail: THERAPIES SERIES
Discharge: HOME | End: 2022-10-31
Payer: COMMERCIAL

## 2022-10-31 PROCEDURE — 97110 THERAPEUTIC EXERCISES: CPT

## 2022-10-31 ASSESSMENT — PAIN DESCRIPTION - DESCRIPTORS: DESCRIPTORS: ACHING

## 2022-10-31 ASSESSMENT — PAIN SCALES - GENERAL: PAINLEVEL_OUTOF10: 3

## 2022-10-31 ASSESSMENT — PAIN DESCRIPTION - ORIENTATION: ORIENTATION: RIGHT

## 2022-10-31 ASSESSMENT — PAIN DESCRIPTION - LOCATION: LOCATION: SHOULDER

## 2022-11-01 NOTE — PROGRESS NOTES
[facility]  PHYSICAL THERAPY PLAN OF CARE   [facility]      [address], [address] [facility], [address] [address]     Ph: [phone] Fax: [phone]      [] Certification  [] Recertification []  Plan of Care  [] Progress Note [x] Discharge      Referring Provider: WALTER Livingston      From:  Jony Sanabria, PT  Patient: Belkis Andrews (77 y.o. male) : 1945 Date: 10/31/2022   Medical Diagnosis: Rotator cuff tendinitis, right [M75.81]  Glenohumeral arthritis, right [M19.011]    Treatment Diagnosis: decreased R shoulder A/PROM, decreased R strength, decreased R UE sensation, decreased posture, and decreased activity tolerance and increased pain with reaching and lifting    Plan of Care/Certification Expiration Date: :     Progress Report Period from:  10/13/2022  to 10/31/2022    Visits to Date: 6 No Show: 0 Cancelled Appts: 0    OBJECTIVE:   Short Term Goals - Time Frame for Short Term Goals: 3 wks    Goals Current/Discharge status  Status   Short Term Goal 1: Pt will demonstrate improved R shoulder AROM for ease with reaching and yard work. AROM RUE (degrees)  R Shoulder Flexion 0-180: 180  R Shoulder ABduction 0-180: 134 deg  R Shoulder Int Rotation  0-70: T10  R Shoulder Ext Rotation 0-90: T4          Met   Short Term Goal 2: Pt will demonstrate improved R shoulder strength >/= 4/5 for decreased pain with reaching and lifting for return to PLOF. R Shoulder Flexion 4/5  R Shoulder Extension 4+/5  R Shoulder ABduction 3+/5  R Shoulder Internal Rotation 4-/5  R Shoulder External Rotation 4/5   Partially met     Long Term Goals - Time Frame for Long Term Goals : 6 wks  Goals Current/ Discharge status Status   Long Term Goal 1: Pt will demonstrate indep and 100% compliance with HEP for self management of symptoms. Independent with current HEP Met   Long Term Goal 2: Pt will demonstrate improved score on UEFS >/= 50/80 for improved quality of life.  UEFS 53/80 Met Long Term Goal 3: Pt will demonstrate decreased R shoulder pain </= 1/10 with reaching and lifting for return to PLOF. Pt reports pain 2-3/10 during shoulder AB In progress, Partially met       Body Structures, Functions, Activity Limitations Requiring Skilled Therapeutic Intervention: Decreased functional mobility , Decreased ROM, Decreased strength, Decreased endurance, Decreased sensation, Increased pain, Decreased posture  Assessment: Objective measurements taken for pt requested d/c from therapy this date. Pt continues to report increase in pain with Rt shoulder AB activities at home including starting the leaf blower and reaching for a cup out to the side with decreased pain in all other planes. Educated pt in modification of activities to avoid increase in pain with Rt shoulder in AB. Review of HEP with addition of 3-way chest pulls and wall slides with education on ways to progess exercises as needed. Pt has met or partially met all goals with continued limitations with abduction ROM and strength. Recommend pt continue with HEP as tolerated to maximize functional use of UE. Therapy Prognosis: Good    PLAN: pt discharged from therapy               Patient Status:[] Continue/ Initiate plan of Care    [x] Discharge PT. Recommend pt continue with HEP. [] Additional visits requested, Please re-certify for additional visits:    [] Hold         Signature: Objective information by: Electronically signed by Kim Mensah PTA on 11/1/22 at 8:30 AM EDT  Electronically signed by Leslie Garcia PT on 11/21/22 at 8:28 AM EST      If you have any questions or concerns, please don't hesitate to call.   Thank you for your referral.

## 2022-11-01 NOTE — PROGRESS NOTES
[address] [facility]  Outpatient Physical Therapy    Treatment Note        Date: 2022  Patient: Dudley Sandhu  : 1945   Confirmed: Yes  MRN: [id number]  Referring Provider: WALTER Narayanan   Secondary Referring Provider (If applicable):     Medical Diagnosis: Rotator cuff tendinitis, right [M75.81]  Glenohumeral arthritis, right [M19.011]    Treatment Diagnosis: decreased R shoulder A/PROM, decreased R strength, decreased R UE sensation, decreased posture, and decreased activity tolerance and increased pain with reaching and lifting    Visit Information:  Insurance: Payor: [employer] / Plan: [employer] [address] / Product Type: *No Product type* /   PT Visit Information  Total # of Visits to Date: 6  No Show: 0  Canceled Appointment: 0  Progress Note Counter:     Subjective Information:  Subjective: Pt 12 minutes late for therapy. Pt requesting d/c from therapy this date. HEP Compliance:  [x] Good [] Fair [] Poor [] Reports not doing due to:    Pain Screening  Patient Currently in Pain: Yes  Pain Level: 3  Pain Location: Shoulder  Pain Orientation: Right  Pain Descriptors: Aching    Treatment:  Exercises:  Exercises  Exercise 1: Objective measurements taken for d/c  Exercise 10: UBE L2 2.5 F/R  Exercise 20: HEP: Ball stabs,       Objective Measures:        Strength: [] NT  [x] MMT completed:   R Shoulder Flexion 4/5  R Shoulder Extension 4+/5  R Shoulder ABduction 3+/5  R Shoulder Internal Rotation 4-/5  R Shoulder External Rotation 4/5     ROM: [] NT  [x] ROM measurements:        AROM RUE (degrees)  R Shoulder Flexion 0-180: 180  R Shoulder ABduction 0-180: 134 deg  R Shoulder Int Rotation  0-70: T10  R Shoulder Ext Rotation 0-90: T4        Assessment:    Body Structures, Functions, Activity Limitations Requiring Skilled Therapeutic Intervention: Decreased functional mobility , Decreased ROM, Decreased strength, Decreased endurance, Decreased sensation, Increased pain, Decreased posture  Assessment: Objective measurements taken for pt requested d/c from therapy this date. Pt continues to report increase in pain with Rt shoulder AB activities at home including starting the leaf blower and reaching for a cup out to the side with decreased pain in all other planes. Educated pt in modification of activities to avoid increase in pain with Rt shoulder in AB. Review of HEP with addition of 3-way chest pulls and wall slides with education on ways to progess exercises as needed. Treatment Diagnosis: decreased R shoulder A/PROM, decreased R strength, decreased R UE sensation, decreased posture, and decreased activity tolerance and increased pain with reaching and lifting  Therapy Prognosis: Good        Post-Pain Assessment:       Pain Rating (0-10 pain scale):   3/10   Location and pain description same as pre-treatment unless indicated. Action: [] NA   [x] Perform HEP  [] Meds as prescribed  [] Modalities as prescribed   [] Call Physician     GOALS   Patient Goal(s): Patient Goals : \"releave shoulder pain\"    Short Term Goals Completed by 3 wks Goal Status   STG 1 Pt will demonstrate improved R shoulder AROM for ease with reaching and yard work. In progress   STG 2 Pt will demonstrate improved R shoulder strength >/= 4/5 for decreased pain with reaching and lifting for return to PLOF. Partially met       Long Term Goals Completed by 6 wks Goal Status   LTG 1 Pt will demonstrate indep and 100% compliance with HEP for self management of symptoms. Met   LTG 2 Pt will demonstrate improved score on UEFS >/= 50/80 for improved quality of life. Met   LTG 3 Pt will demonstrate decreased R shoulder pain </= 1/10 with reaching and lifting for return to PLOF.  In progress, Partially met          Plan:  Frequency/Duration:  Plan  Plan Frequency: 2xs/wk  Plan weeks: 6  Current Treatment Recommendations: Strengthening, ROM, Functional mobility training, Endurance training, Neuromuscular re-education, Manual Therapy - Soft Tissue Mobilization, Pain management, Home exercise program, Safety education & training, Patient/Caregiver education & training, Equipment evaluation, education, & procurement, Modalities, Positioning, Therapeutic activities  Additional Comments: d/c from therapy this date  Pt to continue current HEP. See objective section for any therapeutic exercise changes, additions or modifications this date.     Therapy Time:      PT Individual Minutes  Time In: 7263  Time Out: 1203  Minutes: 36  Timed Code Treatment Minutes: 36 Minutes  Procedure Minutes: 0  Timed Activity Minutes Units   Ther Ex 36 2     Electronically signed by Julio Cesar Pro PTA on 11/1/22 at 8:13 AM EDT

## 2023-10-25 ENCOUNTER — PREP FOR PROCEDURE (OUTPATIENT)
Dept: GASTROENTEROLOGY | Age: 78
End: 2023-10-25

## 2023-10-25 DIAGNOSIS — Z86.010 HISTORY OF COLON POLYPS: ICD-10-CM

## 2023-10-25 PROBLEM — Z86.0100 HISTORY OF COLON POLYPS: Status: ACTIVE | Noted: 2023-10-25

## 2023-11-09 ENCOUNTER — OFFICE VISIT (OUTPATIENT)
Dept: CARDIOLOGY | Facility: CLINIC | Age: 78
End: 2023-11-09
Payer: COMMERCIAL

## 2023-11-09 VITALS
HEART RATE: 65 BPM | SYSTOLIC BLOOD PRESSURE: 157 MMHG | WEIGHT: 251 LBS | DIASTOLIC BLOOD PRESSURE: 82 MMHG | HEIGHT: 70 IN | BODY MASS INDEX: 35.93 KG/M2

## 2023-11-09 DIAGNOSIS — I87.2 CHRONIC VENOUS INSUFFICIENCY: ICD-10-CM

## 2023-11-09 DIAGNOSIS — Z79.01 LONG TERM CURRENT USE OF ANTICOAGULANT THERAPY: ICD-10-CM

## 2023-11-09 DIAGNOSIS — I89.0 LYMPHEDEMA: ICD-10-CM

## 2023-11-09 DIAGNOSIS — G47.33 OBSTRUCTIVE SLEEP APNEA: ICD-10-CM

## 2023-11-09 DIAGNOSIS — I25.10 ATHEROSCLEROSIS OF NATIVE CORONARY ARTERY OF NATIVE HEART WITHOUT ANGINA PECTORIS: Primary | ICD-10-CM

## 2023-11-09 DIAGNOSIS — I10 PRIMARY HYPERTENSION: ICD-10-CM

## 2023-11-09 DIAGNOSIS — I48.21 PERMANENT ATRIAL FIBRILLATION (MULTI): ICD-10-CM

## 2023-11-09 PROBLEM — E78.5 HYPERLIPIDEMIA: Status: ACTIVE | Noted: 2023-11-09

## 2023-11-09 PROCEDURE — 1036F TOBACCO NON-USER: CPT | Performed by: INTERNAL MEDICINE

## 2023-11-09 PROCEDURE — 3079F DIAST BP 80-89 MM HG: CPT | Performed by: INTERNAL MEDICINE

## 2023-11-09 PROCEDURE — 3077F SYST BP >= 140 MM HG: CPT | Performed by: INTERNAL MEDICINE

## 2023-11-09 PROCEDURE — 1159F MED LIST DOCD IN RCRD: CPT | Performed by: INTERNAL MEDICINE

## 2023-11-09 PROCEDURE — 99214 OFFICE O/P EST MOD 30 MIN: CPT | Performed by: INTERNAL MEDICINE

## 2023-11-09 RX ORDER — TADALAFIL 20 MG/1
20 TABLET ORAL
COMMUNITY
Start: 2023-10-24 | End: 2024-06-06 | Stop reason: WASHOUT

## 2023-11-09 RX ORDER — METOPROLOL SUCCINATE 50 MG/1
50 TABLET, EXTENDED RELEASE ORAL DAILY
Qty: 90 TABLET | Refills: 3 | Status: SHIPPED | OUTPATIENT
Start: 2023-11-09 | End: 2024-06-06 | Stop reason: SDUPTHER

## 2023-11-09 RX ORDER — BIOTIN 10 MG
100 TABLET ORAL EVERY 12 HOURS
COMMUNITY

## 2023-11-09 NOTE — PROGRESS NOTES
Patient:  Cesar Myles  YOB: 1945  MRN: [id number]       HPI:       Cesar Myles is a 78 y.o. male who returns today for cardiac follow-up.   He has a history of atherosclerotic heart disease with prior angioplasty and drug-eluting stenting of the first obtuse marginal branch circumflex in 2010. He was also noted to have 50-60% stenosis of LAD. There was normal left ventricular systolic function. He has a history of permanent atrial fibrillation being managed with a rate control strategy. He is chronically anticoagulated with Xarelto. He has been asymptomatic with regards to his atrial fibrillation. He has a history of hypertension and hyperlipidemia. A myocardial perfusion study in June 2018 was negative for myocardial infarction or ischemia. LV ejection fraction 59%. He achieved 3.9 METs. He underwent an echocardiogram that showed normal LV systolic function along with mildly dilated right atrium and right ventricle and mild impairment of RV systolic function. Estimated RVSP was 32 mm Hg. He has some chronic exertional chest discomfort and shortness of breath. He was placed on Ranexa with improvement of his symptoms. He has a history of obstructive sleep apnea for which he uses CPAP.     He underwent extensive evaluation and treatment by Dr. Gigi Bhardwaj for venous insufficiency, varicose veins, and lymphedema. He had ultrasound-guided foam sclerotherapy treatments. He is using a lymphedema pump regularly. He wears graduated compression stockings. His edema has significantly improved and he is very pleased about this.      He has been doing well since his last visit. He denies any chest pain or shortness of breath at rest. He denies any orthopnea, PND, or increasing peripheral edema. He denies any palpitations, lightheadedness, near-syncope, or syncope. He denies any fever, chills, or cough. He denies any nausea, vomiting, or diaphoresis. He denies any hemoptysis, hematemesis, melena, or  hematochezia.  Lab studies dated April 20, 2023 showed a normal CBC and CMP. LDL level on October 25, 2022 was 68. Other details as noted below.     The above portion of this note was dictated by me using voice recognition software. I personally performed the services described in the documentation. The scribe entering the documentation below was in my presence. I affirm that the information is both accurate and complete.       Objective:     Vitals:    11/09/23 1120   BP: 157/82   Pulse: 65       Wt Readings from Last 4 Encounters:   11/09/23 114 kg (251 lb)   05/04/23 116 kg (255 lb)   11/03/22 110 kg (242 lb)   10/24/22 109 kg (241 lb)       Allergies:     No Known Allergies     Medications:     Current Outpatient Medications   Medication Instructions    albuterol 90 mcg/actuation inhaler 2 puffs, inhalation, Every 4 hours PRN    ascorbic acid (VITAMIN C) 500 mg, oral, Daily    aspirin 81 mg EC tablet 1 tablet, oral, Daily    atorvastatin (Lipitor) 40 mg tablet 1 tablet, oral, Daily    biotin 100 mg, oral, Every 12 hours    cholecalciferol (Vitamin D-3) 5,000 Units tablet 1 tablet, oral, Daily    furosemide (Lasix) 20 mg tablet 1 tablet, oral, Daily    irbesartan (Avapro) 150 mg tablet 1 tablet, oral, Daily    Klor-Con M10 10 mEq ER tablet 1 tablet, oral, Daily    metoprolol succinate XL (Toprol-XL) 25 mg 24 hr tablet 1 tablet, oral, Daily    primidone (Mysoline) 50 mg tablet 1 tablet, oral, 3 times daily    ranolazine (RANEXA) 500 mg, oral, 2 times daily    tadalafil (CIALIS) 20 mg, oral, Daily before breakfast    terazosin (HYTRIN) 5 mg, oral, Daily    Xarelto 20 mg, oral, Daily with evening meal       Physical Examination:   GENERAL:  Well developed, well nourished, in no acute distress.  CHEST:  Symmetric and nontender.  NEURO/PSYCH:  Alert and oriented times three with approppriate behavior and responses.  NECK:  Supple, no JVD, no bruit.  LUNGS:  Clear to auscultation bilaterally, normal respiratory  effort.  HEART:  Irregularly irregular rate and rhythm regular with no evident murmur, no gallop appreciated.        There are no rubs, clicks or heaves.  EXTREMITIES:  Warm with good color, no clubbing or cyanosis.  There is no edema noted.  PERIPHERAL VASCULAR:  Pulses present and equally palpable; 2+ throughout.      Lab:     HgBA1c:    Lab Results   Component Value Date    HGBA1C 6.8 (H) 10/25/2022         Asessment:     Problem List Items Addressed This Visit             ICD-10-CM    Atherosclerosis of native coronary artery of native heart without angina pectoris - Primary I25.10    Relevant Medications    tadalafil 20 mg tablet    metoprolol succinate XL (Toprol-XL) 50 mg 24 hr tablet    Other Relevant Orders    Follow Up In Cardiology    Permanent atrial fibrillation (CMS/HCC) I48.21    Relevant Medications    tadalafil 20 mg tablet    metoprolol succinate XL (Toprol-XL) 50 mg 24 hr tablet    Other Relevant Orders    Follow Up In Cardiology    Long term current use of anticoagulant therapy Z79.01    Primary hypertension I10    Relevant Medications    metoprolol succinate XL (Toprol-XL) 50 mg 24 hr tablet    Other Relevant Orders    Follow Up In Cardiology    Obstructive sleep apnea G47.33    Chronic venous insufficiency I87.2    Lymphedema I89.0       Scribe Attestation  By signing my name below, I, Kacey Messer CMA   , Scribe   attest that this documentation has been prepared under the direction and in the presence of Rafi Soto MD.

## 2023-12-01 RX ORDER — SODIUM, POTASSIUM,MAG SULFATES 17.5-3.13G
SOLUTION, RECONSTITUTED, ORAL ORAL
Qty: 354 ML | Refills: 0 | Status: SHIPPED | OUTPATIENT
Start: 2023-12-01

## 2023-12-06 ENCOUNTER — OFFICE VISIT (OUTPATIENT)
Dept: VASCULAR SURGERY | Facility: CLINIC | Age: 78
End: 2023-12-06
Payer: MEDICARE

## 2023-12-06 VITALS
TEMPERATURE: 97.7 F | WEIGHT: 257 LBS | SYSTOLIC BLOOD PRESSURE: 159 MMHG | BODY MASS INDEX: 36.79 KG/M2 | HEART RATE: 64 BPM | HEIGHT: 70 IN | DIASTOLIC BLOOD PRESSURE: 88 MMHG

## 2023-12-06 DIAGNOSIS — I89.0 LYMPHEDEMA: ICD-10-CM

## 2023-12-06 DIAGNOSIS — I87.2 CHRONIC VENOUS INSUFFICIENCY: Primary | ICD-10-CM

## 2023-12-06 DIAGNOSIS — E66.01 MORBID OBESITY (MULTI): ICD-10-CM

## 2023-12-06 DIAGNOSIS — M79.89 OTHER SPECIFIED SOFT TISSUE DISORDERS: ICD-10-CM

## 2023-12-06 DIAGNOSIS — Z79.01 LONG TERM CURRENT USE OF ANTICOAGULANT THERAPY: ICD-10-CM

## 2023-12-06 PROCEDURE — 99213 OFFICE O/P EST LOW 20 MIN: CPT | Performed by: INTERNAL MEDICINE

## 2023-12-06 PROCEDURE — 3077F SYST BP >= 140 MM HG: CPT | Performed by: INTERNAL MEDICINE

## 2023-12-06 PROCEDURE — 3079F DIAST BP 80-89 MM HG: CPT | Performed by: INTERNAL MEDICINE

## 2023-12-06 PROCEDURE — 1159F MED LIST DOCD IN RCRD: CPT | Performed by: INTERNAL MEDICINE

## 2023-12-06 PROCEDURE — 1036F TOBACCO NON-USER: CPT | Performed by: INTERNAL MEDICINE

## 2023-12-06 ASSESSMENT — PATIENT HEALTH QUESTIONNAIRE - PHQ9
1. LITTLE INTEREST OR PLEASURE IN DOING THINGS: NOT AT ALL
2. FEELING DOWN, DEPRESSED OR HOPELESS: NOT AT ALL
SUM OF ALL RESPONSES TO PHQ9 QUESTIONS 1 AND 2: 0

## 2023-12-06 NOTE — PROGRESS NOTES
Chief Complaints:  Seen for follow-up after 12 months of procedures.    HPI:  78 years old male with history of significant bilateral chronic venous insufficiency and also has had lymphedema came for a follow-up visit after he has had treatment and the follow-up visit about a year ago.  Since then he has finished the lymphedema therapy with the therapist and he has a lymphedema pump.  He says he is only doing the pump 3-4 times per week at this time.  His leg symptoms are noted below.    He had 1 accidental fall in had some back pain.  He is undergoing physical therapy.    Patient is using CPAP regularly.       Symptoms: Rare muscle cramps and intermittent swelling especially if he did not use the pump and wear the stockings which are rare.  The patient is currently experiencing symptoms only rarely. Symptoms are located on both sides. The patient describes the pain as aching, heavy and dull. Onset was gradual More than 6-7 year(s) ago. Onset followed Believes after the back surgery the leg symptoms worsen. The symptoms occur constantly. The episodes occur daily. He describes this as mild and stable exacerbating factors:  leg dependency, prolonged sitting and prolonged standing. Relieving factors:  elevation and support stockings. Associated symptoms: History of stasis dermatitis. Current treatment includes rest, compression stockings and leg elevation. By report, there is good compliance with treatment, good tolerance of treatment and good symptom control. Since diagnosis the disease has improved and stable. Recently, the disease has been worsening. Disease complications:  chronic edema, chronic dermatitis and hyperpigmentation. Pertinent medical history:  no deep venous thrombosis, no hypercoagulable state, no pulmonary embolism and no thrombophlebitis. Risk factors:  family history of varicose veins, obesity and Sleep apnea. He was previously evaluated Cardiologist. Past treatment has included compression  stockings, lymphedema therapy, lymphedema pump, previous endovenous ablation and image guided foam sclerotherapy.     ROS:  Respiratory:  Denies shortness of breath.  Denies cough, sinus congestion    Cardiovascular:    No chest pain.  Denies any history suggestive of claudication.  No cyanosis.  Palpitations, denies.    Neurologic:    No significant history of tingling/Numbness.  Has chronic back pain and had some radiculopathy    Social History:  Tobacco Use:  None    Current Outpatient Medications on File Prior to Visit   Medication Sig Dispense Refill    ascorbic acid (Vitamin C) 500 mg tablet Take 1 tablet (500 mg) by mouth once daily.      aspirin 81 mg EC tablet Take 1 tablet (81 mg) by mouth once daily.      atorvastatin (Lipitor) 40 mg tablet Take 1 tablet (40 mg) by mouth once daily.      biotin 10 mg tablet Take 10 tablets (100 mg) by mouth every 12 hours.      cholecalciferol (Vitamin D-3) 5,000 Units tablet Take 1 tablet (5,000 Units) by mouth once daily.      furosemide (Lasix) 20 mg tablet Take 1 tablet (20 mg) by mouth once daily.      irbesartan (Avapro) 150 mg tablet Take 1 tablet (150 mg) by mouth once daily.      Klor-Con M10 10 mEq ER tablet Take 1 tablet (10 mEq) by mouth once daily.      metoprolol succinate XL (Toprol-XL) 50 mg 24 hr tablet Take 1 tablet (50 mg) by mouth once daily. Do not crush or chew. 90 tablet 3    primidone (Mysoline) 50 mg tablet Take 1 tablet (50 mg) by mouth 3 times a day.      ranolazine (Ranexa) 500 mg 12 hr tablet Take 1 tablet (500 mg) by mouth 2 times a day.      tadalafil 20 mg tablet Take 1 tablet (20 mg) by mouth once daily in the morning. Take before meals.      terazosin (Hytrin) 5 mg capsule TAKE 1 CAPSULE DAILY 90 capsule 3    Xarelto 20 mg tablet Take 1 tablet (20 mg) by mouth once daily in the evening. Take with meals.      albuterol 90 mcg/actuation inhaler Inhale 2 puffs every 4 hours if needed for shortness of breath.       No current  facility-administered medications on file prior to visit.        No Known Allergies     Examination:    Visit Vitals  Smoking Status Former        Obese,, well-nourished with no apparent distress. Alert oriented  Skin: Normal turgor. No rash. Hand tremors noted  Head: Normocephalic, atraumatic.  Eyes: Pupils are equal, round,.  No pallor of conjunctivae. Wearing a mask  Neck: Supple. No JVD.  No clubbing  Chest: Vesicular breathing Bilaterally moderate air entry. No wheezing. No crackles.  Heart: Rate is controlled.  Extremities:  Bilaterally 2+ dorsalis pedis pulses.  No calf tenderness. Homans sign is negative.  Neuro Exam: No focal signs. Gait is slightly abnormal but walks independently.     Venous Exam:  Varicose veins in left calf absent ].  Varicose veins in left thigh absent ].  Varicose veins in right calf absent ].  Varicose veins in right thigh absent ].  Reticular veins in left calf present  Reticular veins in left thigh [ present   Reticular veins in right calf present  Reticular veins in right thigh absent ].  Telangiectasias in left calf [ present   Telangiectasias in left thigh [ absent ].  Telangiectasias in right calf [ present   Telangiectasias in right thigh absent ].  Right leg trace edema [ present   Left leg trace edema [ present   Chronic hyperpigmentation in left leg [ present   Chronic hyperpigmentation in right leg [ present   Lipodermatosclerosis in right leg absent ].  Lipodermatosclerosis in left leg absent ].  Dermatitis in left leg [absent  Dermatitis in right leg [absent  Corona phlebectatica in left leg absent ].  Corona phlebectatica in right leg absent ].  Atrophe surendra in left leg absent ].  Atrophe surendra in right leg absent ].  Ulcer(s) in left leg absent ].  Ulcer(s) in right leg absent ].     Right leg CEAP C4aS     Left leg CEAP C4abS      Diagnosis/ Problems    Assessment:    _ Varicose veins of bilateral lower extremities with other complications - I83.893 (Primary)  _  Varicose veins of leg with pain - I83.819 (Primary)  _ Chronic venous insufficiency - I87.2  _ Other specified soft tissue disorders - M79.89  _ Varicose veins of bilateral lower extremities with pain - I83.813    Lymphedema  _ Leg cramping - R25.2      No orders of the defined types were placed in this encounter.      Plan     Chronic venous insufficiency of bilateral lower extremities with other complications  Overall patient is doing well but he has some symptoms and we will do the bilateral detailed ultrasound evaluation to make sure there is no recurrence of venous insufficiency.    Discussions  Patient has significant improvement from the bilateral leg venous congestions and also lymphedema.  Patient also following the appropriate instructions including wearing the graduated compression stockings, lymphedema therapy, CPAP and lifestyle modifications.  I advised him to do the lymphedema pump every day.  He could also decrease the compression to 20/30 over-the-counter as needed on and off.    Patient understands to continue to wear the graduated compression stockings and lifestyle modifications as we have discussed in the past and today.

## 2023-12-09 RX ORDER — SODIUM CHLORIDE 9 MG/ML
INJECTION, SOLUTION INTRAVENOUS PRN
Status: CANCELLED | OUTPATIENT
Start: 2023-12-09

## 2023-12-09 RX ORDER — SODIUM CHLORIDE 0.9 % (FLUSH) 0.9 %
5-40 SYRINGE (ML) INJECTION PRN
Status: CANCELLED | OUTPATIENT
Start: 2023-12-09

## 2023-12-09 RX ORDER — SODIUM CHLORIDE 9 MG/ML
INJECTION, SOLUTION INTRAVENOUS CONTINUOUS
Status: CANCELLED | OUTPATIENT
Start: 2023-12-09

## 2023-12-12 ENCOUNTER — ANESTHESIA EVENT (OUTPATIENT)
Dept: ENDOSCOPY | Age: 78
End: 2023-12-12
Payer: MEDICARE

## 2023-12-12 ENCOUNTER — ANESTHESIA (OUTPATIENT)
Dept: ENDOSCOPY | Age: 78
End: 2023-12-12
Payer: MEDICARE

## 2023-12-12 ENCOUNTER — HOSPITAL ENCOUNTER (OUTPATIENT)
Age: 78
Setting detail: OUTPATIENT SURGERY
Discharge: HOME | End: 2023-12-12
Attending: SPECIALIST | Admitting: SPECIALIST
Payer: MEDICARE

## 2023-12-12 VITALS
HEIGHT: 70 IN | RESPIRATION RATE: 16 BRPM | BODY MASS INDEX: 35.93 KG/M2 | DIASTOLIC BLOOD PRESSURE: 90 MMHG | WEIGHT: 251 LBS | OXYGEN SATURATION: 97 % | TEMPERATURE: 98.1 F | SYSTOLIC BLOOD PRESSURE: 138 MMHG | HEART RATE: 63 BPM

## 2023-12-12 DIAGNOSIS — Z86.010 HISTORY OF COLON POLYPS: ICD-10-CM

## 2023-12-12 PROCEDURE — 45385 COLONOSCOPY W/LESION REMOVAL: CPT | Performed by: SPECIALIST

## 2023-12-12 PROCEDURE — 7100000011 HC PHASE II RECOVERY - ADDTL 15 MIN: Performed by: SPECIALIST

## 2023-12-12 PROCEDURE — 88305 TISSUE EXAM BY PATHOLOGIST: CPT

## 2023-12-12 PROCEDURE — 45380 COLONOSCOPY AND BIOPSY: CPT | Performed by: SPECIALIST

## 2023-12-12 PROCEDURE — 3700000000 HC ANESTHESIA ATTENDED CARE: Performed by: SPECIALIST

## 2023-12-12 PROCEDURE — 2709999900 HC NON-CHARGEABLE SUPPLY: Performed by: SPECIALIST

## 2023-12-12 PROCEDURE — 2580000003 HC RX 258: Performed by: SPECIALIST

## 2023-12-12 PROCEDURE — 7100000010 HC PHASE II RECOVERY - FIRST 15 MIN: Performed by: SPECIALIST

## 2023-12-12 PROCEDURE — 45390 COLONOSCOPY W/RESECTION: CPT | Performed by: SPECIALIST

## 2023-12-12 PROCEDURE — 6370000000 HC RX 637 (ALT 250 FOR IP): Performed by: SPECIALIST

## 2023-12-12 PROCEDURE — 2580000003 HC RX 258

## 2023-12-12 PROCEDURE — 3700000001 HC ADD 15 MINUTES (ANESTHESIA): Performed by: SPECIALIST

## 2023-12-12 PROCEDURE — 3609027000 HC COLONOSCOPY: Performed by: SPECIALIST

## 2023-12-12 DEVICE — INSTINCT PLUS ENDOSCOPIC CLIPPING DEVICE
Type: IMPLANTABLE DEVICE | Site: RECTUM | Status: FUNCTIONAL
Brand: INSTINCT

## 2023-12-12 RX ORDER — SODIUM CHLORIDE 0.9 % (FLUSH) 0.9 %
SYRINGE (ML) INJECTION PRN
Status: DISCONTINUED | OUTPATIENT
Start: 2023-12-12 | End: 2023-12-12 | Stop reason: ALTCHOICE

## 2023-12-12 RX ORDER — SIMETHICONE 20 MG/.3ML
EMULSION ORAL PRN
Status: DISCONTINUED | OUTPATIENT
Start: 2023-12-12 | End: 2023-12-12 | Stop reason: ALTCHOICE

## 2023-12-12 RX ORDER — SODIUM CHLORIDE 9 MG/ML
INJECTION, SOLUTION INTRAVENOUS CONTINUOUS
Status: DISCONTINUED | OUTPATIENT
Start: 2023-12-12 | End: 2023-12-12 | Stop reason: HOSPADM

## 2023-12-12 RX ORDER — MAGNESIUM HYDROXIDE 1200 MG/15ML
LIQUID ORAL PRN
Status: DISCONTINUED | OUTPATIENT
Start: 2023-12-12 | End: 2023-12-12 | Stop reason: ALTCHOICE

## 2023-12-12 RX ORDER — SODIUM CHLORIDE 9 MG/ML
INJECTION, SOLUTION INTRAVENOUS PRN
Status: DISCONTINUED | OUTPATIENT
Start: 2023-12-12 | End: 2023-12-12 | Stop reason: HOSPADM

## 2023-12-12 RX ORDER — FUROSEMIDE 20 MG/1
20 TABLET ORAL DAILY
COMMUNITY

## 2023-12-12 RX ORDER — SODIUM CHLORIDE 9 MG/ML
INJECTION, SOLUTION INTRAVENOUS
Status: COMPLETED
Start: 2023-12-12 | End: 2023-12-12

## 2023-12-12 RX ORDER — SODIUM CHLORIDE 0.9 % (FLUSH) 0.9 %
5-40 SYRINGE (ML) INJECTION PRN
Status: DISCONTINUED | OUTPATIENT
Start: 2023-12-12 | End: 2023-12-12 | Stop reason: HOSPADM

## 2023-12-12 RX ORDER — PROPOFOL 10 MG/ML
INJECTION, EMULSION INTRAVENOUS CONTINUOUS PRN
Status: DISCONTINUED | OUTPATIENT
Start: 2023-12-12 | End: 2023-12-12 | Stop reason: SDUPTHER

## 2023-12-12 RX ADMIN — PROPOFOL 180 MCG/KG/MIN: 10 INJECTION, EMULSION INTRAVENOUS at 08:52

## 2023-12-12 RX ADMIN — SODIUM CHLORIDE: 9 INJECTION, SOLUTION INTRAVENOUS at 08:32

## 2023-12-12 ASSESSMENT — PAIN - FUNCTIONAL ASSESSMENT: PAIN_FUNCTIONAL_ASSESSMENT: 0-10

## 2023-12-12 NOTE — ANESTHESIA POSTPROCEDURE EVALUATION
Department of Anesthesiology  Postprocedure Note    Patient: Tito Corado  MRN: [id number]  YOB: 1945  Date of evaluation: 12/12/2023      Procedure Summary       Date: 12/12/23 Room / Location: [facility] OR 01 / [facility]    Anesthesia Start: 9359 Anesthesia Stop: 8820    Procedure: COLORECTAL CANCER SCREENING, HIGH RISK Diagnosis:       History of colon polyps      (History of colon polyps [Z86.010])    Surgeons: Beth Lopez MD Responsible Provider: LINDA Ro CRNA    Anesthesia Type: MAC ASA Status: 3            Anesthesia Type: No value filed.     Marielena Phase I: Marielena Score: 10    Marielena Phase II:        Anesthesia Post Evaluation    Patient location during evaluation: PACU  Patient participation: complete - patient participated  Level of consciousness: awake and alert  Pain score: 0  Airway patency: patent  Nausea & Vomiting: no nausea and no vomiting  Complications: no  Cardiovascular status: blood pressure returned to baseline and hemodynamically stable  Respiratory status: acceptable, nasal cannula and spontaneous ventilation  Hydration status: euvolemic  Pain management: adequate

## 2023-12-12 NOTE — ANESTHESIA PRE PROCEDURE
Department of Anesthesiology  Preprocedure Note       Name:  Lizandro Duke   Age:  78 y.o.  :  1945                                          MRN:  [id number]         Date:  2023      Surgeon: Karen Sofia):  Jeremiah Larkin MD    Procedure: Procedure(s):  COLORECTAL CANCER SCREENING, HIGH RISK    Medications prior to admission:   Prior to Admission medications    Medication Sig Start Date End Date Taking?  Authorizing Provider   sodium-potassium-mag sulfate (SUPREP) 17.5-3.13-1.6 GM/177ML SOLN solution As directed 23   Jeremiah Larkin MD   irbesartan (AVAPRO) 150 MG tablet Take 150 tablets by mouth daily    Giles Uribe MD   potassium chloride (KLOR-CON M) 10 MEQ extended release tablet Take 10 tablets by mouth daily    Giles Uribe MD   terazosin (HYTRIN) 5 MG capsule Take 5 tablets by mouth daily    Giles Uribe MD   Lactobacillus (PROBIOTIC ACIDOPHILUS PO) Take by mouth    Giles Uribe MD   omeprazole (PRILOSEC) 20 MG delayed release capsule Take 1 capsule by mouth 2 times daily (before meals) for 7 days 21  Beltran Campbell APRN - NP   tadalafil (CIALIS) 20 MG tablet Take 20 mg by mouth as needed for Erectile Dysfunction    Giles Uribe MD   ranolazine (RANEXA) 500 MG extended release tablet Take 500 mg by mouth 2 times daily    Deonte Beltran MD   Vitamin E 200 units TABS Take 1 tablet by mouth daily    Giles Uribe MD   Ascorbic Acid (VITAMIN C) 500 MG CAPS Take 1 tablet by mouth daily    Giles Uribe MD   Cholecalciferol (VITAMIN D3) 5000 units TABS Take 5,000 Units by mouth daily    Giles Uribe MD   rivaroxaban (XARELTO) 20 MG TABS tablet Take 20 mg by mouth daily (with breakfast)     Giles Uribe MD   primidone (MYSOLINE) 50 MG tablet Take 50 mg by mouth daily     Giles Uribe MD   tamsulosin (FLOMAX) 0.4 MG capsule Take 1 capsule by mouth daily for 10 days 16

## 2024-02-01 ENCOUNTER — OFFICE VISIT (OUTPATIENT)
Dept: PRIMARY CARE | Facility: CLINIC | Age: 79
End: 2024-02-01
Payer: MEDICARE

## 2024-02-01 DIAGNOSIS — I89.0 LYMPHEDEMA: ICD-10-CM

## 2024-02-01 DIAGNOSIS — Z79.01 LONG TERM CURRENT USE OF ANTICOAGULANT THERAPY: ICD-10-CM

## 2024-02-01 DIAGNOSIS — I87.2 CHRONIC VENOUS INSUFFICIENCY: ICD-10-CM

## 2024-02-01 DIAGNOSIS — M79.89 OTHER SPECIFIED SOFT TISSUE DISORDERS: ICD-10-CM

## 2024-02-01 PROCEDURE — 93970 EXTREMITY STUDY: CPT | Performed by: INTERNAL MEDICINE

## 2024-02-01 NOTE — PROGRESS NOTES
Venous Duplex      Indications:  Limb pain  Leg Edema    Sonographer: Hazel Klein RVT    TECHNIQUE:  Venous Duplex ultrasound spectral Doppler wave modality was performed with the patient in the supine and upright positions to determine the status of the deep and superficial systems of the right and left lower extremity. The common femoral, femoral and popliteal veins of the deep venous system were imaged. The superficial system was imaged entirely to include, but was not limited to, the saphenous-femoral junction (SFJ) down the greater saphenous vein from the groin to the ankle, and the saphenous-popliteal junction (SPJ), if present, at the popliteal fossa down the small saphenous vein (SSV) to the ankle. As indicated, the cranial extensions of the SSV (CESSV), the anterior accessory GSV (AAGSV), and the posterior accessory GSV (PAGSV) were also evaluated, if present. All areas meeting the criteria for venous reflux (500 milliseconds or greater in the saphenous veins and principle branches, 350 milliseconds in perforators and 1000 milliseconds in the deep system) were confirmed with spectral Doppler analysis and confirmatory images were documented:     FINDINGS ARE THE FOLLOWING:    RIGHT LEG:  There is no evidence of thrombus in the interrogated segments of the deep or superficial venous system. There is no evidence of deep venous reflux.     GSV dimensions: 4.7mm junction   Proximal segment is non compressible   Mid segment is non compressible   3.7mm Distal   The proximal to mid Greater Saphenous Vein is non compressible, consistent with previous treatment  There is >0.5 seconds of reflux in the distal Great Saphenous Vein    SSV dimensions: 2.6mm junction  2.3mm mid  There is <0.5 seconds of reflux in the Small Saphenous Vein    AAGSV dimensions: 2.8mm   There is <0.5 seconds of reflux in the Anterior Accessory Saphenous Vein    There are multiple incompetent tributaries along the thigh and lower  leg.    Trib1 dimensions: 3.1mm  Trib2 dimensions: 3.1mm  There is >0.5 seconds of reflux in the tributaries       LEFT LEG:  There is no evidence of thrombus in the interrogated segments of the deep or superficial venous system. There is no evidence of deep venous reflux.    GSV dimensions: 5.6mm junction   Proximal segment is non compressible    Mid segment is non compressible   Distal segment is not visualized  The Greater Saphenous Vein is non compressible, consistent with previous treatment    SSV dimensions: 2.0mm junction  3.6mm mid  There is <0.5 seconds of reflux in the Small Saphenous Vein    There are multiple incompetent tributaries along the thigh and lower leg.    Trib1 dimensions: 3.1mm  Trib2 dimensions: 2.0mm  There is >0.5 seconds of reflux in the tributaries      CONCLUSIONS:  There is no evidence of thrombus in the interrogated segments of the deep or superficial venous system in both legs.  No evidence of deep venous reflux.  Patient's previously identified refluxing truncal veins treated successfully.  Numerous incompetent tributaries are seen in both legs as noted in the MAP measured more than 3 mm in diameter.    Discussions  As per the detailed reflux study of the legs patient may benefit from image guided foam sclerotherapy of the refluxing tributaries which are more than 3 mm in diameter.  Clinical correlation advised.  Discussions

## 2024-02-08 ENCOUNTER — OFFICE VISIT (OUTPATIENT)
Dept: PRIMARY CARE | Facility: CLINIC | Age: 79
End: 2024-02-08
Payer: MEDICARE

## 2024-02-08 VITALS
HEART RATE: 73 BPM | DIASTOLIC BLOOD PRESSURE: 91 MMHG | WEIGHT: 256.6 LBS | HEIGHT: 70 IN | BODY MASS INDEX: 36.73 KG/M2 | SYSTOLIC BLOOD PRESSURE: 143 MMHG

## 2024-02-08 DIAGNOSIS — I89.0 LYMPHEDEMA: ICD-10-CM

## 2024-02-08 DIAGNOSIS — I87.322 IDIOPATHIC CHRONIC VENOUS HYPERTENSION OF LEFT LEG WITH INFLAMMATION: ICD-10-CM

## 2024-02-08 DIAGNOSIS — Z79.01 LONG TERM CURRENT USE OF ANTICOAGULANT THERAPY: Primary | ICD-10-CM

## 2024-02-08 DIAGNOSIS — I87.2 CHRONIC VENOUS INSUFFICIENCY: ICD-10-CM

## 2024-02-08 DIAGNOSIS — I87.321 IDIOPATHIC CHRONIC VENOUS HYPERTENSION OF RIGHT LEG WITH INFLAMMATION: ICD-10-CM

## 2024-02-08 PROCEDURE — 3077F SYST BP >= 140 MM HG: CPT | Performed by: INTERNAL MEDICINE

## 2024-02-08 PROCEDURE — 99214 OFFICE O/P EST MOD 30 MIN: CPT | Performed by: INTERNAL MEDICINE

## 2024-02-08 PROCEDURE — 1159F MED LIST DOCD IN RCRD: CPT | Performed by: INTERNAL MEDICINE

## 2024-02-08 PROCEDURE — 3080F DIAST BP >= 90 MM HG: CPT | Performed by: INTERNAL MEDICINE

## 2024-02-08 PROCEDURE — 1036F TOBACCO NON-USER: CPT | Performed by: INTERNAL MEDICINE

## 2024-02-08 PROCEDURE — 1160F RVW MEDS BY RX/DR IN RCRD: CPT | Performed by: INTERNAL MEDICINE

## 2024-02-08 NOTE — PROGRESS NOTES
Chief Complaints:  Seen for follow-up after detailed ultrasound evaluation.    HPI:  78 years old male with chronic medical problem including atrial fibrillation and on chronic anticoagulation has had successful treatment for his bilateral chronic venous insufficiency.  Patient says overall he has improved including after having the lymphedema therapy.  He is being compliant with using the lymphedema pump and wearing the stockings at this time.  His symptoms as noted below pertinent to his legs.    He is finishing up his physical therapy for his shoulder pain after he has had a fall.  He is able to do normal activities even though he says that he has some chronic short of breath with mild to moderate exertion.  He lives with his wife.       Symptoms: Rare muscle cramps and intermittent swelling especially if he did not use the pump and wear the stockings which are rare.  The patient is currently experiencing symptoms only rarely. Symptoms are located on both sides. The patient describes the pain as aching, heavy and dull. Onset was gradual More than 6-7 year(s) ago. Onset followed Believes after the back surgery the leg symptoms worsen. The symptoms occur constantly. The episodes occur daily. He describes this as mild and stable exacerbating factors:  leg dependency, prolonged sitting and prolonged standing. Relieving factors:  elevation and support stockings. Associated symptoms: History of stasis dermatitis. Current treatment includes rest, compression stockings and leg elevation. By report, there is good compliance with treatment, good tolerance of treatment and good symptom control. Since diagnosis the disease has improved and stable. Recently, the disease has been worsening. Disease complications:  chronic edema, chronic dermatitis and hyperpigmentation. Pertinent medical history:  no deep venous thrombosis, no hypercoagulable state, no pulmonary embolism and no thrombophlebitis. Risk factors:  family history  of varicose veins, obesity and Sleep apnea. He was previously evaluated Cardiologist. Past treatment has included compression stockings, lymphedema therapy, lymphedema pump, previous endovenous ablation and image guided foam sclerotherapy.     ROS:  Respiratory:  Denies shortness of breath at rest but has dyspnea on moderate exertion.  No cough, sinus congestion    Cardiovascular:    Denies chest pain.  No symptoms suggestive of claudication.  No cyanosis.  No palpitations, denies.    Neurologic:    Rarely gets tingling/Numbness.  Denies loss of strength.    Social History:  Tobacco Use:  None  Had history of smoking    Current Outpatient Medications on File Prior to Visit   Medication Sig Dispense Refill    albuterol 90 mcg/actuation inhaler Inhale 2 puffs every 4 hours if needed for shortness of breath.      ascorbic acid (Vitamin C) 500 mg tablet Take 1 tablet (500 mg) by mouth once daily.      aspirin 81 mg EC tablet Take 1 tablet (81 mg) by mouth once daily.      atorvastatin (Lipitor) 40 mg tablet Take 1 tablet (40 mg) by mouth once daily.      biotin 10 mg tablet Take 10 tablets (100 mg) by mouth every 12 hours.      cholecalciferol (Vitamin D-3) 5,000 Units tablet Take 1 tablet (5,000 Units) by mouth once daily.      furosemide (Lasix) 20 mg tablet Take 1 tablet (20 mg) by mouth every other day.      irbesartan (Avapro) 150 mg tablet Take 1 tablet (150 mg) by mouth once daily.      Klor-Con M10 10 mEq ER tablet Take 1 tablet (10 mEq) by mouth once daily.      metoprolol succinate XL (Toprol-XL) 50 mg 24 hr tablet Take 1 tablet (50 mg) by mouth once daily. Do not crush or chew. 90 tablet 3    primidone (Mysoline) 50 mg tablet Take 1 tablet (50 mg) by mouth 3 times a day.      ranolazine (Ranexa) 500 mg 12 hr tablet Take 1 tablet (500 mg) by mouth 2 times a day.      tadalafil 20 mg tablet Take 1 tablet (20 mg) by mouth once daily in the morning. Take before meals.      terazosin (Hytrin) 5 mg capsule TAKE 1  "CAPSULE DAILY 90 capsule 3    Xarelto 20 mg tablet Take 1 tablet (20 mg) by mouth once daily in the evening. Take with meals.       No current facility-administered medications on file prior to visit.        No Known Allergies     Examination:    Visit Vitals  BP (!) 143/91   Pulse 73   Ht 1.778 m (5' 10\")   Wt 116 kg (256 lb 9.6 oz)   BMI 36.82 kg/m²   Smoking Status Former   BSA 2.39 m²        Obese,, well-nourished with no apparent distress. Alert oriented  Patient has chronic tremors.  Skin: Normal turgor. No rash. Hand tremors noted  Head: Normocephalic, atraumatic.  Eyes: Pupils are equal, round,.  No pallor of conjunctivae.  Mucous membranes are moist  Neck: Supple. No JVD.  No clubbing  Chest: Vesicular breathing Bilaterally moderate air entry. No wheezing. No crackles.  Heart: Rate is controlled.  Extremities:  Bilaterally 2+ dorsalis pedis pulses.  No calf tenderness. Homans sign is negative.  Neuro Exam: No focal signs. Gait is slightly abnormal but walks independently.     Venous Exam:  Varicose veins in left calf absent ].  Varicose veins in left thigh absent ].  Varicose veins in right calf absent ].  Varicose veins in right thigh absent ].  Reticular veins in left calf present  Reticular veins in left thigh [ present   Reticular veins in right calf present  Reticular veins in right thigh absent ].  Telangiectasias in left calf [ present   Telangiectasias in left thigh [ absent ].  Telangiectasias in right calf [ present   Telangiectasias in right thigh absent ].  Right leg trace edema [ present   Left leg trace edema [ present   Chronic hyperpigmentation in left leg [ present   Chronic hyperpigmentation in right leg [ present   Lipodermatosclerosis in right leg absent ].  Lipodermatosclerosis in left leg absent ].  Dermatitis in left leg [absent  Dermatitis in right leg [absent  Corona phlebectatica in left leg absent ].  Corona phlebectatica in right leg absent ].  Atrophe surendra in left leg absent " ].  Atrophe surendra in right leg absent ].  Ulcer(s) in left leg absent ].  Ulcer(s) in right leg absent ].     Right leg CEAP C4raS     Left leg CEAP C4raS      Diagnosis/ Problems    Assessment:    _ Varicose veins of bilateral lower extremities with other complications - I83.893 (Primary)  _ Varicose veins of leg with pain - I83.819 (Primary)  _ Chronic venous insufficiency - I87.2  _ Other specified soft tissue disorders - M79.89  _ Varicose veins of bilateral lower extremities with pain - I83.813  Lymphedema  _ Leg cramping - R25.2  _ Varicose veins of both lower extremities with complications - 454.8    Problem List Items Addressed This Visit       Long term current use of anticoagulant therapy - Primary    Chronic venous insufficiency    Relevant Orders    Compression Stockings 20-30 mmHg    Compression Stockings 30-40 mmHg    Lymphedema    Idiopathic chronic venous hypertension of left leg with inflammation    Relevant Orders    Compression Stockings 20-30 mmHg    Compression Stockings 30-40 mmHg    Idiopathic chronic venous hypertension of right leg with inflammation    Relevant Orders    Compression Stockings 20-30 mmHg    Compression Stockings 30-40 mmHg          Plan     Chronic venous insufficiency of bilateral lower extremities with other complications  Patient may need adjunctive image guided foam sclerotherapy X 1  in each leg    Discussions   Patient's detail ultrasound evaluation including the illustration of the refluxing superficial venous system of both legs were reviewed in detail with the patient.  Patient has both legs tributaries reflux identified giving rise to the symptoms.  Because of the progressive nature of venous disease and patient's continued symptoms in spite of doing the conservative management including lifestyle modification and wearing the graduated compression stockings further options were discussed with the patient. Closure of the  superficial tributary veins by  ultrasound-guided foam sclerotherapy of large refluxing veins was discussed. Risks, benefits, goals and alternatives and reasonable expectations were discussed.  All risks were discussed, including, but not limited to hyperpigmentation, skin necrosis, recurrence/progression of the disease/symptoms, infection, DVT, SVT and all other possible complications. All questions were answered to patient satisfaction. Patients understands and wishes to proceed..  Explanation including the measures taken to prevent the possible complications  which includes wearing the graduated compression stockings immediately after procedure and overnight that day and also walking every hour about 10 minutes during her waking time on the day of the procedure.  He will continue to wear the graduated compression stockings during the daytime for minimum 2 weeks and also she will be active including no traveling or prolonged nonambulatory status for 2 weeks to prevent DVT and other complications.    We discussed the recent documentation that the if the BMI is more than 40 the success of venous procedures are limited. I encouraged the patient that he should the try to improve his health by loosing weight if possible prior to the venous procedures. Given the patient's significant obesity with a BMI of 36.8 to I advised the patient that she should work with the primary care physician and other appropriate consults to lose weight.    His other chronic problems are stable and he understands if there is any change in his chronic medical problems he should inform us.      Counseling.  The patient was counseled regarding instructions for management, risk factor reductions, prognosis, patient and family education, impressions, risks and benefits of treatment options and importance of compliance with treatment. Total time of encounter was 33 minutes and 22 minutes was spent counseling.

## 2024-03-05 DIAGNOSIS — E78.5 HYPERLIPIDEMIA, UNSPECIFIED HYPERLIPIDEMIA TYPE: ICD-10-CM

## 2024-03-05 RX ORDER — POTASSIUM CHLORIDE 750 MG/1
10 TABLET, EXTENDED RELEASE ORAL DAILY
Qty: 100 TABLET | Refills: 0 | Status: SHIPPED | OUTPATIENT
Start: 2024-03-05 | End: 2024-06-06 | Stop reason: SDUPTHER

## 2024-03-12 ENCOUNTER — TELEPHONE (OUTPATIENT)
Dept: CARDIOLOGY | Facility: CLINIC | Age: 79
End: 2024-03-12
Payer: MEDICARE

## 2024-03-21 DIAGNOSIS — I10 PRIMARY HYPERTENSION: ICD-10-CM

## 2024-03-21 RX ORDER — RANOLAZINE 500 MG/1
500 TABLET, EXTENDED RELEASE ORAL 2 TIMES DAILY
Qty: 180 TABLET | Refills: 0 | Status: SHIPPED | OUTPATIENT
Start: 2024-03-21 | End: 2024-06-06 | Stop reason: SDUPTHER

## 2024-03-21 RX ORDER — IRBESARTAN 150 MG/1
150 TABLET ORAL DAILY
Qty: 90 TABLET | Refills: 0 | Status: SHIPPED | OUTPATIENT
Start: 2024-03-21 | End: 2024-06-06 | Stop reason: SDUPTHER

## 2024-04-24 ENCOUNTER — APPOINTMENT (OUTPATIENT)
Dept: PRIMARY CARE | Facility: CLINIC | Age: 79
End: 2024-04-24
Payer: MEDICARE

## 2024-04-25 ENCOUNTER — OFFICE VISIT (OUTPATIENT)
Dept: PRIMARY CARE | Facility: CLINIC | Age: 79
End: 2024-04-25
Payer: MEDICARE

## 2024-04-25 VITALS — SYSTOLIC BLOOD PRESSURE: 140 MMHG | DIASTOLIC BLOOD PRESSURE: 75 MMHG | HEART RATE: 60 BPM

## 2024-04-25 DIAGNOSIS — I87.321 IDIOPATHIC CHRONIC VENOUS HYPERTENSION OF RIGHT LEG WITH INFLAMMATION: Primary | ICD-10-CM

## 2024-04-25 PROCEDURE — 1160F RVW MEDS BY RX/DR IN RCRD: CPT | Performed by: INTERNAL MEDICINE

## 2024-04-25 PROCEDURE — 76942 ECHO GUIDE FOR BIOPSY: CPT | Performed by: INTERNAL MEDICINE

## 2024-04-25 PROCEDURE — 1159F MED LIST DOCD IN RCRD: CPT | Performed by: INTERNAL MEDICINE

## 2024-04-25 PROCEDURE — 3078F DIAST BP <80 MM HG: CPT | Performed by: INTERNAL MEDICINE

## 2024-04-25 PROCEDURE — 36471 NJX SCLRSNT MLT INCMPTNT VN: CPT | Performed by: INTERNAL MEDICINE

## 2024-04-25 PROCEDURE — 3077F SYST BP >= 140 MM HG: CPT | Performed by: INTERNAL MEDICINE

## 2024-04-25 NOTE — PROGRESS NOTES
Ultrasound Guided Sclerotherapy of right leg    Procedure: Sclerotherapy, Multiple Veins, of the right Lower Extremity Utilizing Ultrasound Needle Guidance.    Performed by Gordon Bhardwaj MD, [facility], [facility]    Sonographer: Performed by: Hazel Klein RVT    Pre Procedure: The need for sclerotherapy was based on the previous abnormal vein findings from a physical examination and duplex ultrasound evaluation with color flow, spectral Doppler of the saphenous venous system evaluated in the separate procedure dated 2/1/2024.  The detailed results were documented separately.  During this procedure the refluxing or abnormal veins were treated using ultrasound-guided sclerotherapy injections to chemically ablate, or close, the affected veins. The goal of treatment is to improve the venous hemodynamics of this extremity and improve the patient's vein symptoms of vein disease. A 13-5 MHz linear array ultrasound transducer was used to identify the mapped veins. The needle was then strategically placed into the abnormal veins under ultrasound guidance. Administration of the sclerosing agent and the vessels' responses were observed by ultrasound. Duplex ultrasonography was used to diagnose the disease, confirm treatment success, and locate persistent/recurrent refluxing veins. Any additional abnormalities, e.g. deep venous involvement, were noted.    Provider's Assessment The patient returns today for follow-up of the right lower extremity. The patient is doing well. There are still some residual symptoms, as previously noted, including pain. Overall, the patient's symptoms have improved. There is no swelling noted. No infection. Duplex Ultrasound shows remaining refluxing tributaries, which will be treated today.     Time out: Immediately prior to the procedure, the entire procedure team, along with the patient, verified the patient's identification, intended procedure, correct procedure site, and that all equipment and supplies  were present. The patient signed the informed consent after reviewing the risks, benefits, and alternatives previously discussed with the patient by the physician. Time 2:40 PM    Anesthesia: None.    Procedure Description: The patient was positioned horizontally on the procedure table, and the skin of lower extremity treatment area was prepped with 70% isopropyl alcohol. Under ultrasound guidance, the abnormal veins were accessed with 25 gauge percutaneous needles. Administration of the sclerosing agent and the vessels' responses were observed with ultrasound. The puncture/injection(s) was performed under ultrasound guidance. right Lower Extremity Sclerotherapy Treated Areas: Calf and ankle, from posterior lateral, medial and anterior. Patient received a total of 2.5 ml of foam prepared with 1.5% STS 1:4 with room air (Tessari method), administered intravenously in 2 injections to sclerose selected veins.    Complications: None.    Post Treatment Assessment: The patient tolerated the procedure well. Discharge instructions were given to and discussed with the patient. The patient was instructed to phone for any unusual signs or symptoms. The patient understands and agrees. , In-office observation with elevation of the legs was utilized after treatment. , Graduated compression stockings were applied in the office. The patient is to wear them for: Overnight for a day and Daily for 14 day(s). The patient tolerated the procedure well. Discharge instructions were given to and discussed with the patient. The patient was instructed to phone for any unusual signs or symptoms. The patient understands and agrees. , Follow-up evaluation of the treatment is planned in 90 days .

## 2024-05-01 ENCOUNTER — OFFICE VISIT (OUTPATIENT)
Dept: PRIMARY CARE | Facility: CLINIC | Age: 79
End: 2024-05-01
Payer: MEDICARE

## 2024-05-01 VITALS — HEART RATE: 66 BPM | DIASTOLIC BLOOD PRESSURE: 90 MMHG | SYSTOLIC BLOOD PRESSURE: 175 MMHG

## 2024-05-01 DIAGNOSIS — I87.322 IDIOPATHIC CHRONIC VENOUS HYPERTENSION OF LEFT LEG WITH INFLAMMATION: Primary | ICD-10-CM

## 2024-05-01 PROCEDURE — 3080F DIAST BP >= 90 MM HG: CPT | Performed by: INTERNAL MEDICINE

## 2024-05-01 PROCEDURE — 76942 ECHO GUIDE FOR BIOPSY: CPT | Performed by: INTERNAL MEDICINE

## 2024-05-01 PROCEDURE — 1159F MED LIST DOCD IN RCRD: CPT | Performed by: INTERNAL MEDICINE

## 2024-05-01 PROCEDURE — 36471 NJX SCLRSNT MLT INCMPTNT VN: CPT | Performed by: INTERNAL MEDICINE

## 2024-05-01 PROCEDURE — 3077F SYST BP >= 140 MM HG: CPT | Performed by: INTERNAL MEDICINE

## 2024-05-01 PROCEDURE — 1160F RVW MEDS BY RX/DR IN RCRD: CPT | Performed by: INTERNAL MEDICINE

## 2024-05-01 NOTE — PROGRESS NOTES
Ultrasound Guided Sclerotherapy of left leg    Procedure: Sclerotherapy, Multiple Veins, of the left Lower Extremity Utilizing Ultrasound Needle Guidance.    Performed by Gordon Bhardwaj MD, [facility]    Sonographer: Performed by: Hazel Klein RVT    Pre Procedure: The need for sclerotherapy was based on the previous abnormal vein findings from a physical examination and duplex ultrasound evaluation with color flow, spectral Doppler of the saphenous venous system evaluated in the separate procedure dated 2/1/2024.  The detailed results were documented separately.  During this procedure the refluxing or abnormal veins were treated using ultrasound-guided sclerotherapy injections to chemically ablate, or close, the affected veins. The goal of treatment is to improve the venous hemodynamics of this extremity and improve the patient's vein symptoms of vein disease. A 13-5 MHz linear array ultrasound transducer was used to identify the mapped veins. The needle was then strategically placed into the abnormal veins under ultrasound guidance. Administration of the sclerosing agent and the vessels' responses were observed by ultrasound. Duplex ultrasonography was used to diagnose the disease, confirm treatment success, and locate persistent/recurrent refluxing veins. Any additional abnormalities, e.g. deep venous involvement, were noted.    Provider's Assessment The patient returns today for follow-up of the left lower extremity. The patient is doing well. There are still some residual symptoms, as previously noted, including pain. Overall, the patient's symptoms have improved. There is no swelling noted. No infection. Duplex Ultrasound shows remaining refluxing tributaries, which will be treated today.     Time out: Immediately prior to the procedure, the entire procedure team, along with the patient, verified the patient's identification, intended procedure, correct procedure site, and that all equipment and supplies were  present. The patient signed the informed consent after reviewing the risks, benefits, and alternatives previously discussed with the patient by the physician. Time 12:55 PM    Anesthesia: None.    Procedure Description: The patient was positioned horizontally on the procedure table, and the skin of lower extremity treatment area was prepped with 70% isopropyl alcohol. Under ultrasound guidance, the abnormal veins were accessed with 25 gauge percutaneous needles. Administration of the sclerosing agent and the vessels' responses were observed with ultrasound. The puncture/injection(s) was performed under ultrasound guidance. left Lower Extremity Sclerotherapy Treated Areas: thigh, calf and ankle, from posterior lateral, medial and anterior. Patient received a total of 2.5 ml of foam prepared with 1.5% STS 1:4 with room air (Tessari method), administered intravenously in 2 injections to sclerose selected veins.    Complications: None.    Post Treatment Assessment: The patient tolerated the procedure well. Discharge instructions were given to and discussed with the patient. The patient was instructed to phone for any unusual signs or symptoms. The patient understands and agrees. , In-office observation with elevation of the legs was utilized after treatment. , Graduated compression stockings were applied in the office. The patient is to wear them for: Overnight for a day and Daily for 14 day(s). The patient tolerated the procedure well. Discharge instructions were given to and discussed with the patient. The patient was instructed to phone for any unusual signs or symptoms. The patient understands and agrees. , Follow-up evaluation of the treatment is planned in 90 days .

## 2024-06-03 ENCOUNTER — OFFICE VISIT (OUTPATIENT)
Dept: OTOLARYNGOLOGY | Facility: CLINIC | Age: 79
End: 2024-06-03
Payer: MEDICARE

## 2024-06-03 ENCOUNTER — CLINICAL SUPPORT (OUTPATIENT)
Dept: AUDIOLOGY | Facility: CLINIC | Age: 79
End: 2024-06-03
Payer: MEDICARE

## 2024-06-03 DIAGNOSIS — H90.A32 MIXED CONDUCTIVE AND SENSORINEURAL HEARING LOSS OF LEFT EAR WITH RESTRICTED HEARING OF RIGHT EAR: ICD-10-CM

## 2024-06-03 DIAGNOSIS — H65.22 LEFT CHRONIC SEROUS OTITIS MEDIA: Primary | ICD-10-CM

## 2024-06-03 DIAGNOSIS — H90.A21 SENSORINEURAL HEARING LOSS (SNHL) OF RIGHT EAR WITH RESTRICTED HEARING OF LEFT EAR: ICD-10-CM

## 2024-06-03 DIAGNOSIS — H90.A32 MIXED CONDUCTIVE AND SENSORINEURAL HEARING LOSS OF LEFT EAR WITH RESTRICTED HEARING OF RIGHT EAR: Primary | ICD-10-CM

## 2024-06-03 PROCEDURE — 99203 OFFICE O/P NEW LOW 30 MIN: CPT | Performed by: OTOLARYNGOLOGY

## 2024-06-03 PROCEDURE — 1159F MED LIST DOCD IN RCRD: CPT | Performed by: OTOLARYNGOLOGY

## 2024-06-03 PROCEDURE — 92557 COMPREHENSIVE HEARING TEST: CPT | Performed by: AUDIOLOGIST

## 2024-06-03 PROCEDURE — 92567 TYMPANOMETRY: CPT | Performed by: AUDIOLOGIST

## 2024-06-03 PROCEDURE — 1160F RVW MEDS BY RX/DR IN RCRD: CPT | Performed by: OTOLARYNGOLOGY

## 2024-06-03 ASSESSMENT — ENCOUNTER SYMPTOMS
RESPIRATORY NEGATIVE: 1
NEUROLOGICAL NEGATIVE: 1
CARDIOVASCULAR NEGATIVE: 1
CONSTITUTIONAL NEGATIVE: 1

## 2024-06-03 NOTE — PROGRESS NOTES
Subjective   Patient ID: Cesar Myles is a 78 y.o. male who presents for Hearing Loss.  He is seen through the kind request of Dr. Lee.    HPI  Patient has been noted to have a history of significant blockage in his left ear for 3 to 4 months now.  He denies any pain.  He has a prior history of hearing loss and possible perforation.  The remaining head neck inquiry is otherwise clear.  No worrisome alleviating/aggravating factors.  No vertigo or tinnitus.  No history of Ménière's or hydrops.      Review of Systems   Constitutional: Negative.    HENT: Negative.     Respiratory: Negative.     Cardiovascular: Negative.    Neurological: Negative.      Physical Exam  General appearance: No acute distress. Normal facies. Symmetric facial movement. No gross lesions of the face are noted.  Examination of the right ear is unremarkable. There is no evidence of middle ear effusion or abnormality of the external auditory canal, tympanic membrane, and external ear itself.  Left ear has evidence of a dimeric drum with middle ear effusion confirmed with flattened tympanogram and conductive overlay loss superimposed on symmetric sensorineural component.  Nasal exam is clear anteriorly. The septum is relatively straight and the nasal mucosa is grossly unremarkable without evidence of any worrisome infection or polyp or mass. The oral cavity and oropharynx are unremarkable. There is no evidence of any gross lesion or mucosal irregularity throughout the structures. The neck is negative for mass or lymphadenopathy. The trachea and parotid region are clear free of obvious mass or tumor.    Assessment/Plan   78-year-old male with evidence of middle ear effusion for the last 3 to 4 months.  I will go ahead and place him on a course of prednisone as he wants to try that before placing a tube in the ear.  I will see him back for recheck in 3 to 4 weeks and if not improved I will place a tube in that left ear accordingly.  All questions  were answered in this regard accordingly.      Thank you again for allowing us to participate in the care of this patient.    This note was created with voice recognition software and has not been corrected for typographical or grammatical errors.

## 2024-06-03 NOTE — PROGRESS NOTES
Mr. Myles, age 78, was seen today for a hearing evaluation during his ENT visit with Dr. Fuentes.  He reported concern for hearing loss, left greater than right with recurrent ear infections.    Results:  Otoscopy revealed clear ear canals and tympanic membranes were visualized bilaterally.  Tympanometry revealed a normal, Type A tympanogram in his right ear, indicating normal ear canal volume, peak pressure and compliance and a flat, Type B tympanogram in his left ear, suggesting middle ear fluid.  Audiometric thresholds revealed a mild sloping to moderate sensorineural hearing loss and a mild sloping to profound mixed hearing loss in his left ear.  Word recognition scores were excellent bilaterally.    Recommendations:  Follow-up with PCP, Dr. Lee, as medically directed.  Follow-up with ENT, Dr. Fuentes, as medically directed.  Retest hearing in conjunction with otologic management.

## 2024-06-04 DIAGNOSIS — H65.22 LEFT CHRONIC SEROUS OTITIS MEDIA: Primary | ICD-10-CM

## 2024-06-04 RX ORDER — PREDNISONE 20 MG/1
TABLET ORAL
Qty: 9 TABLET | Refills: 0 | Status: SHIPPED | OUTPATIENT
Start: 2024-06-04

## 2024-06-06 ENCOUNTER — OFFICE VISIT (OUTPATIENT)
Dept: CARDIOLOGY | Facility: CLINIC | Age: 79
End: 2024-06-06
Payer: MEDICARE

## 2024-06-06 VITALS
WEIGHT: 253 LBS | HEIGHT: 70 IN | BODY MASS INDEX: 36.22 KG/M2 | DIASTOLIC BLOOD PRESSURE: 70 MMHG | SYSTOLIC BLOOD PRESSURE: 112 MMHG | HEART RATE: 68 BPM

## 2024-06-06 DIAGNOSIS — I87.2 CHRONIC VENOUS INSUFFICIENCY: ICD-10-CM

## 2024-06-06 DIAGNOSIS — E78.2 MIXED HYPERLIPIDEMIA: ICD-10-CM

## 2024-06-06 DIAGNOSIS — I25.119 ATHEROSCLEROSIS OF NATIVE CORONARY ARTERY OF NATIVE HEART WITH ANGINA PECTORIS (CMS-HCC): Primary | ICD-10-CM

## 2024-06-06 DIAGNOSIS — I25.10 ATHEROSCLEROSIS OF NATIVE CORONARY ARTERY OF NATIVE HEART WITHOUT ANGINA PECTORIS: ICD-10-CM

## 2024-06-06 DIAGNOSIS — R07.9 CHEST PAIN, UNSPECIFIED TYPE: ICD-10-CM

## 2024-06-06 DIAGNOSIS — Z79.01 LONG TERM CURRENT USE OF ANTICOAGULANT THERAPY: ICD-10-CM

## 2024-06-06 DIAGNOSIS — I48.21 PERMANENT ATRIAL FIBRILLATION (MULTI): ICD-10-CM

## 2024-06-06 DIAGNOSIS — I89.0 LYMPHEDEMA: ICD-10-CM

## 2024-06-06 DIAGNOSIS — I10 PRIMARY HYPERTENSION: ICD-10-CM

## 2024-06-06 DIAGNOSIS — G47.33 OBSTRUCTIVE SLEEP APNEA: ICD-10-CM

## 2024-06-06 PROCEDURE — 1036F TOBACCO NON-USER: CPT | Performed by: INTERNAL MEDICINE

## 2024-06-06 PROCEDURE — 99214 OFFICE O/P EST MOD 30 MIN: CPT | Performed by: INTERNAL MEDICINE

## 2024-06-06 PROCEDURE — 3074F SYST BP LT 130 MM HG: CPT | Performed by: INTERNAL MEDICINE

## 2024-06-06 PROCEDURE — 3078F DIAST BP <80 MM HG: CPT | Performed by: INTERNAL MEDICINE

## 2024-06-06 RX ORDER — FUROSEMIDE 20 MG/1
20 TABLET ORAL EVERY OTHER DAY
Qty: 45 TABLET | Refills: 1 | Status: SHIPPED | OUTPATIENT
Start: 2024-06-06

## 2024-06-06 RX ORDER — FINASTERIDE 5 MG/1
0.5 TABLET, FILM COATED ORAL DAILY
COMMUNITY
Start: 2024-05-16

## 2024-06-06 RX ORDER — IRBESARTAN 150 MG/1
150 TABLET ORAL DAILY
Qty: 90 TABLET | Refills: 1 | Status: SHIPPED | OUTPATIENT
Start: 2024-06-06

## 2024-06-06 RX ORDER — ATORVASTATIN CALCIUM 40 MG/1
40 TABLET, FILM COATED ORAL DAILY
Qty: 90 TABLET | Refills: 1 | Status: SHIPPED | OUTPATIENT
Start: 2024-06-06

## 2024-06-06 RX ORDER — RANOLAZINE 500 MG/1
500 TABLET, EXTENDED RELEASE ORAL 2 TIMES DAILY
Qty: 180 TABLET | Refills: 1 | Status: SHIPPED | OUTPATIENT
Start: 2024-06-06

## 2024-06-06 RX ORDER — TERAZOSIN 5 MG/1
5 CAPSULE ORAL DAILY
Qty: 90 CAPSULE | Refills: 1 | Status: SHIPPED | OUTPATIENT
Start: 2024-06-06

## 2024-06-06 RX ORDER — METOPROLOL SUCCINATE 50 MG/1
50 TABLET, EXTENDED RELEASE ORAL DAILY
Qty: 90 TABLET | Refills: 1 | Status: SHIPPED | OUTPATIENT
Start: 2024-06-06

## 2024-06-06 RX ORDER — POTASSIUM CHLORIDE 750 MG/1
10 TABLET, FILM COATED, EXTENDED RELEASE ORAL DAILY
Qty: 90 TABLET | Refills: 1 | Status: SHIPPED | OUTPATIENT
Start: 2024-06-06

## 2024-06-06 NOTE — PATIENT INSTRUCTIONS
HAVE LABS DONE BEFORE NEXT OFFICE VISIT.     PLEASE BRING ALL MEDICATION BOTTLES TO OFFICE VISITS.

## 2024-06-06 NOTE — PROGRESS NOTES
Patient:  Cesar Myles  YOB: 1945  MRN: [id number]       HPI:       Cesar Myles is a 78 y.o. male who returns today for cardiac follow-up.  He has a history of atherosclerotic heart disease with prior angioplasty and drug-eluting stenting of the first obtuse marginal branch circumflex in 2010. He was also noted to have 50-60% stenosis of LAD. There was normal left ventricular systolic function. He has a history of permanent atrial fibrillation being managed with a rate control strategy. He is chronically anticoagulated with Xarelto. He has been asymptomatic with regards to his atrial fibrillation. He has a history of hypertension and hyperlipidemia. A myocardial perfusion study in June 2018 was negative for myocardial infarction or ischemia. LV ejection fraction was 59%. He achieved 3.9 METs. An echocardiogram showed normal LV systolic function along with mildly dilated right atrium and right ventricle.  There was mild impairment of RV systolic function. Estimated RVSP was 32 mm Hg. He has some chronic exertional chest discomfort and shortness of breath. He was placed on Ranexa with improvement of his symptoms. He has a history of obstructive sleep apnea for which he uses CPAP.     He underwent extensive evaluation and treatment by Dr. Gigi Bhardwaj for venous insufficiency, varicose veins, and lymphedema. He had ultrasound-guided foam sclerotherapy treatments. He is using a lymphedema pump regularly. He wears graduated compression stockings. His edema has significantly improved.      He reports that he has recently been under quite a bit of stress.  This partly relates to some family health issues.  Also, his wife is going to be retiring within the next few months.  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  He does note some intermittent episodes of tightening in his chest.  He had several episodes a month ago lasting 10 to 15 minutes.  Over the past few weeks he has been getting  1 or 2 episodes a week.  He feels they are more related to stress than anything else.  He denies any exertional related chest discomfort.  He has not used sublingual nitroglycerin.  He denies any orthopnea, PND, or increasing peripheral edema. He denies any palpitations, lightheadedness, near-syncope, or syncope. He denies any fever, chills, or cough. He denies any nausea, vomiting, or diaphoresis. He denies any hemoptysis, hematemesis, melena, or hematochezia.  Lab studies March 8, 2024 showed a normal CBC and CMP with exception of glucose 148.  Hemoglobin A1c was 7.2.  LDL cholesterol October 23, 2023 was 71.  Other details as noted below.     The above portion of this note was dictated by me using voice recognition software. I personally performed the services described in the documentation. The scribe entering the documentation below was in my presence. I affirm that the information is both accurate and complete.      Objective:     Vitals:    06/06/24 1109   BP: 112/70   Pulse: 68       Wt Readings from Last 4 Encounters:   02/08/24 116 kg (256 lb 9.6 oz)   12/06/23 117 kg (257 lb)   11/09/23 114 kg (251 lb)   05/04/23 116 kg (255 lb)       Allergies:     No Known Allergies       Medications:     Current Outpatient Medications   Medication Instructions    ascorbic acid (VITAMIN C) 500 mg, oral, Daily    aspirin 81 mg EC tablet 1 tablet, oral, Daily    atorvastatin (Lipitor) 40 mg tablet 1 tablet, oral, Daily    biotin 100 mg, oral, Every 12 hours    cholecalciferol (Vitamin D-3) 5,000 Units tablet 1 tablet, oral, Daily    furosemide (Lasix) 20 mg tablet 1 tablet, oral, Every other day    irbesartan (AVAPRO) 150 mg, oral, Daily    Klor-Con M10 10 mEq ER tablet 10 mEq, oral, Daily    metoprolol succinate XL (TOPROL-XL) 50 mg, oral, Daily, Do not crush or chew.    predniSONE (Deltasone) 20 mg tablet 2 tabs daily x3 days then 1 tablet daily for 2 days then 1/2 tablet for 2 days    primidone (Mysoline) 50 mg tablet  1 tablet, oral, 3 times daily    ranolazine (RANEXA) 500 mg, oral, 2 times daily    tadalafil (CIALIS) 20 mg, oral, Daily before breakfast    terazosin (HYTRIN) 5 mg, oral, Daily    Xarelto 20 mg, oral, Daily with evening meal       Physical Examination:   GENERAL:  Well developed, well nourished, in no acute distress.  CHEST:  Symmetric and nontender.  NEURO/PSYCH:  Alert and oriented times three with approppriate behavior and responses.  NECK:  Supple, no JVD, no bruit.  LUNGS:  Clear to auscultation bilaterally, normal respiratory effort.  HEART:  Rate and rhythm irregularly irregular with no evident murmur, no gallop appreciated.        There are no rubs, clicks or heaves.  EXTREMITIES:  Warm with good color, no clubbing or cyanosis.  There is no edema noted.  PERIPHERAL VASCULAR:  Pulses present and equally palpable; 2+ throughout.    Problem List:     Patient Active Problem List   Diagnosis    Atherosclerosis of native coronary artery of native heart without angina pectoris    Permanent atrial fibrillation (Multi)    Long term current use of anticoagulant therapy    Primary hypertension    Hyperlipidemia    Obstructive sleep apnea    Chronic venous insufficiency    Lymphedema    Morbid obesity (Multi)    Idiopathic chronic venous hypertension of left leg with inflammation    Idiopathic chronic venous hypertension of right leg with inflammation    Left chronic serous otitis media    Mixed conductive and sensorineural hearing loss of left ear with restricted hearing of right ear    Sensorineural hearing loss (SNHL) of right ear with restricted hearing of left ear       Asessment:     Problem List Items Addressed This Visit             ICD-10-CM    Atherosclerosis of native coronary artery of native heart with angina pectoris (CMS-[facility]) - Primary I25.119    Relevant Medications    metoprolol succinate XL (Toprol-XL) 50 mg 24 hr tablet    ranolazine (Ranexa) 500 mg 12 hr tablet    Permanent atrial fibrillation  (Multi) I48.21    Relevant Medications    metoprolol succinate XL (Toprol-XL) 50 mg 24 hr tablet    ranolazine (Ranexa) 500 mg 12 hr tablet    rivaroxaban (Xarelto) 20 mg tablet    Other Relevant Orders    CBC    Comprehensive metabolic panel    Lipid panel    Long term current use of anticoagulant therapy Z79.01    Primary hypertension I10    Relevant Medications    irbesartan (Avapro) 150 mg tablet    metoprolol succinate XL (Toprol-XL) 50 mg 24 hr tablet    ranolazine (Ranexa) 500 mg 12 hr tablet    terazosin (Hytrin) 5 mg capsule    furosemide (Lasix) 20 mg tablet    Other Relevant Orders    CBC    Comprehensive metabolic panel    Lipid panel    Hyperlipidemia E78.5    Relevant Medications    atorvastatin (Lipitor) 40 mg tablet    potassium chloride CR (Klor-Con M10) 10 mEq ER tablet    Other Relevant Orders    CBC    Comprehensive metabolic panel    Lipid panel    Obstructive sleep apnea G47.33    Chronic venous insufficiency I87.2    Lymphedema I89.0     Other Visit Diagnoses         Codes    Chest pain, unspecified type     R07.9    Relevant Medications    ranolazine (Ranexa) 500 mg 12 hr tablet    Other Relevant Orders    CBC    Comprehensive metabolic panel    Lipid panel    Nuclear Stress Test            Plan:     -In light of his history of of ASHD, prior stents, and recent episodes of chest pressure he will be scheduled for a stress myocardial perfusion study in the near future.    -He was advised to restrict sodium with an aim of no more than 2 grams per day.     -He was advised on the need to follow a Mediterranean style diet.    -He was advised on use of as needed supplement nitroglycerin.    -He was advised to seek medical attention for any new or worsening symptoms.    -Follow-up as scheduled.     all other ROS negative except as per HPI

## 2024-06-20 ENCOUNTER — CLINICAL SUPPORT (OUTPATIENT)
Dept: CARDIOLOGY | Facility: HOSPITAL | Age: 79
End: 2024-06-20
Payer: MEDICARE

## 2024-06-20 DIAGNOSIS — R07.9 CHEST PAIN, UNSPECIFIED TYPE: ICD-10-CM

## 2024-06-20 PROCEDURE — 78452 HT MUSCLE IMAGE SPECT MULT: CPT

## 2024-06-20 PROCEDURE — 93018 CV STRESS TEST I&R ONLY: CPT | Performed by: INTERNAL MEDICINE

## 2024-06-20 PROCEDURE — 78452 HT MUSCLE IMAGE SPECT MULT: CPT | Performed by: INTERNAL MEDICINE

## 2024-06-20 PROCEDURE — 93016 CV STRESS TEST SUPVJ ONLY: CPT | Performed by: INTERNAL MEDICINE

## 2024-06-24 ENCOUNTER — APPOINTMENT (OUTPATIENT)
Dept: OTOLARYNGOLOGY | Facility: CLINIC | Age: 79
End: 2024-06-24
Payer: MEDICARE

## 2024-06-24 DIAGNOSIS — H65.22 LEFT CHRONIC SEROUS OTITIS MEDIA: Primary | ICD-10-CM

## 2024-06-24 PROCEDURE — 69433 CREATE EARDRUM OPENING: CPT | Performed by: OTOLARYNGOLOGY

## 2024-06-24 PROCEDURE — 99213 OFFICE O/P EST LOW 20 MIN: CPT | Performed by: OTOLARYNGOLOGY

## 2024-06-24 NOTE — PROGRESS NOTES
Cesar Myles is a 78 y.o. year old male patient with 3 WEEK FU ON LEFT EAR     Patient presents to the office today for follow-up on left ear.  Patient reporting no improvement despite treatment with prednisone for otitis media.  Patient returns today for placement of tube.  All other ENT issues are negative.      Physical Exam:   General appearance: No acute distress. Normal facies. Symmetric facial movement. No gross lesions of the face are noted.  The external ear structures appear normal.  Examination of the right ear is unremarkable. There is no evidence of middle ear effusion or abnormality of the external auditory canal, tympanic membrane, and external ear itself.  Examination of the patient's left ear is noted for evidence of chronic effusion..  Anterior rhinoscopy notes essentially a midline nasal septum. Examination is noted for normal healthy mucosal membranes without any evidence of lesions, polyps, or exudate. The tongue is normally mobile. There are no lesions on the gingiva, buccal, or oral mucosa. There are no oral cavity masses.  The neck is negative for mass lymphadenopathy. The trachea and parotid are clear. The thyroid bed is grossly unremarkable. The salivary gland structures are grossly unremarkable.      Procedure:  Following topical anesthesia with phenol left myringotomy with tube placement was performed.  Incision was made and effusion removed #5 suction.  Tube was then placed with alligator forceps and Gomez needle.    Assessment/Plan   1.  Chronic otitis media    Patient with history of left chronic otitis media now status post placement of tube.  Recommendation is observation and follow-up in 6 months or sooner if necessary.

## 2024-08-06 ENCOUNTER — APPOINTMENT (OUTPATIENT)
Dept: PRIMARY CARE | Facility: CLINIC | Age: 79
End: 2024-08-06
Payer: MEDICARE

## 2024-09-20 DIAGNOSIS — I89.0 LYMPHEDEMA: ICD-10-CM

## 2024-09-20 DIAGNOSIS — I87.322 IDIOPATHIC CHRONIC VENOUS HYPERTENSION OF LEFT LEG WITH INFLAMMATION: Primary | ICD-10-CM

## 2024-09-20 DIAGNOSIS — I87.321 IDIOPATHIC CHRONIC VENOUS HYPERTENSION OF RIGHT LEG WITH INFLAMMATION: ICD-10-CM

## 2024-10-03 ENCOUNTER — APPOINTMENT (OUTPATIENT)
Dept: PRIMARY CARE | Facility: CLINIC | Age: 79
End: 2024-10-03
Payer: MEDICARE

## 2024-10-09 ENCOUNTER — APPOINTMENT (OUTPATIENT)
Dept: VASCULAR SURGERY | Facility: CLINIC | Age: 79
End: 2024-10-09
Payer: MEDICARE

## 2024-10-10 ENCOUNTER — APPOINTMENT (OUTPATIENT)
Dept: PRIMARY CARE | Facility: CLINIC | Age: 79
End: 2024-10-10
Payer: MEDICARE

## 2024-11-06 ENCOUNTER — APPOINTMENT (OUTPATIENT)
Dept: OTOLARYNGOLOGY | Facility: CLINIC | Age: 79
End: 2024-11-06
Payer: MEDICARE

## 2024-11-06 DIAGNOSIS — H92.12 OTORRHEA OF LEFT EAR: Primary | ICD-10-CM

## 2024-11-06 PROCEDURE — 99213 OFFICE O/P EST LOW 20 MIN: CPT | Performed by: OTOLARYNGOLOGY

## 2024-11-06 PROCEDURE — 1159F MED LIST DOCD IN RCRD: CPT | Performed by: OTOLARYNGOLOGY

## 2024-11-06 PROCEDURE — 1160F RVW MEDS BY RX/DR IN RCRD: CPT | Performed by: OTOLARYNGOLOGY

## 2024-11-06 RX ORDER — CIPROFLOXACIN AND DEXAMETHASONE 3; 1 MG/ML; MG/ML
4 SUSPENSION/ DROPS AURICULAR (OTIC) 2 TIMES DAILY
Qty: 7.5 ML | Refills: 0 | Status: SHIPPED | OUTPATIENT
Start: 2024-11-06 | End: 2024-11-16

## 2024-11-06 NOTE — PROGRESS NOTES
Patient returns.  Seeing him back today recheck on his ears.  He has noted some intermittent bleeding from the left ear.  He has noted significant blockage in the ear now.  He is not sure if the tube extruded.  He denies any pain with either ear.  No other worrisome ENT symptoms or signs.  There have been no significant changes in past medical or past surgical histories except as mentioned.    Exam:  No acute distress.  Examination of the right ear is unremarkable. There is no evidence of middle ear effusion or abnormality of the external auditory canal, tympanic membrane, and external ear itself.  Left ear debrided under the microscope.  Tube lumen opened up with old blood crust etc. all removed.  Nasal exam is clear anteriorly. The septum is relatively straight and the nasal mucosa is grossly unremarkable without evidence of any worrisome infection or polyp or mass. The oral cavity and oropharynx are unremarkable. There is no evidence of any gross lesion or mucosal irregularity throughout the structures. The neck is negative for mass or lymphadenopathy. The trachea and parotid region are clear free of obvious mass or tumor. Facial structures are grossly otherwise unremarkable as well.    Assessment and plan:  Left-sided bloody otorrhea via tube with possible granulation tissue which led to bleeding etc.  We will start Ciprodex drops and see the patient back for recheck in 1 month.  All questions were answered in this regard accordingly.

## 2024-11-12 DIAGNOSIS — I48.21 PERMANENT ATRIAL FIBRILLATION (MULTI): ICD-10-CM

## 2024-12-03 DIAGNOSIS — I48.21 PERMANENT ATRIAL FIBRILLATION (MULTI): ICD-10-CM

## 2024-12-03 DIAGNOSIS — I10 PRIMARY HYPERTENSION: ICD-10-CM

## 2024-12-03 DIAGNOSIS — R07.9 CHEST PAIN, UNSPECIFIED TYPE: ICD-10-CM

## 2024-12-03 RX ORDER — IRBESARTAN 150 MG/1
150 TABLET ORAL DAILY
Qty: 90 TABLET | Refills: 1 | Status: SHIPPED | OUTPATIENT
Start: 2024-12-03 | End: 2024-12-06 | Stop reason: SDUPTHER

## 2024-12-03 RX ORDER — RANOLAZINE 500 MG/1
500 TABLET, EXTENDED RELEASE ORAL 2 TIMES DAILY
Qty: 180 TABLET | Refills: 1 | Status: SHIPPED | OUTPATIENT
Start: 2024-12-03 | End: 2024-12-06 | Stop reason: SDUPTHER

## 2024-12-06 ENCOUNTER — APPOINTMENT (OUTPATIENT)
Dept: CARDIOLOGY | Facility: CLINIC | Age: 79
End: 2024-12-06
Payer: MEDICARE

## 2024-12-06 VITALS
WEIGHT: 252.4 LBS | BODY MASS INDEX: 36.13 KG/M2 | HEIGHT: 70 IN | SYSTOLIC BLOOD PRESSURE: 136 MMHG | DIASTOLIC BLOOD PRESSURE: 80 MMHG | HEART RATE: 69 BPM

## 2024-12-06 DIAGNOSIS — I10 PRIMARY HYPERTENSION: ICD-10-CM

## 2024-12-06 DIAGNOSIS — R07.9 CHEST PAIN, UNSPECIFIED TYPE: ICD-10-CM

## 2024-12-06 DIAGNOSIS — Z79.01 LONG TERM CURRENT USE OF ANTICOAGULANT THERAPY: ICD-10-CM

## 2024-12-06 DIAGNOSIS — I87.321 IDIOPATHIC CHRONIC VENOUS HYPERTENSION OF RIGHT LEG WITH INFLAMMATION: ICD-10-CM

## 2024-12-06 DIAGNOSIS — I87.322 IDIOPATHIC CHRONIC VENOUS HYPERTENSION OF LEFT LEG WITH INFLAMMATION: ICD-10-CM

## 2024-12-06 DIAGNOSIS — I48.21 PERMANENT ATRIAL FIBRILLATION (MULTI): ICD-10-CM

## 2024-12-06 DIAGNOSIS — I89.0 LYMPHEDEMA: ICD-10-CM

## 2024-12-06 DIAGNOSIS — E78.2 MIXED HYPERLIPIDEMIA: ICD-10-CM

## 2024-12-06 DIAGNOSIS — G60.9 IDIOPATHIC POLYNEUROPATHY: ICD-10-CM

## 2024-12-06 PROCEDURE — 99214 OFFICE O/P EST MOD 30 MIN: CPT | Performed by: INTERNAL MEDICINE

## 2024-12-06 PROCEDURE — 1036F TOBACCO NON-USER: CPT | Performed by: INTERNAL MEDICINE

## 2024-12-06 PROCEDURE — 3075F SYST BP GE 130 - 139MM HG: CPT | Performed by: INTERNAL MEDICINE

## 2024-12-06 PROCEDURE — 1159F MED LIST DOCD IN RCRD: CPT | Performed by: INTERNAL MEDICINE

## 2024-12-06 PROCEDURE — 3079F DIAST BP 80-89 MM HG: CPT | Performed by: INTERNAL MEDICINE

## 2024-12-06 RX ORDER — ATORVASTATIN CALCIUM 40 MG/1
40 TABLET, FILM COATED ORAL DAILY
Qty: 90 TABLET | Refills: 3 | Status: SHIPPED | OUTPATIENT
Start: 2024-12-06 | End: 2025-12-06

## 2024-12-06 RX ORDER — BISMUTH SUBSALICYLATE 262 MG
1 TABLET,CHEWABLE ORAL DAILY
COMMUNITY

## 2024-12-06 RX ORDER — TERAZOSIN 5 MG/1
5 CAPSULE ORAL DAILY
Qty: 90 CAPSULE | Refills: 3 | Status: SHIPPED | OUTPATIENT
Start: 2024-12-06 | End: 2025-12-06

## 2024-12-06 RX ORDER — IRBESARTAN 150 MG/1
150 TABLET ORAL DAILY
Qty: 90 TABLET | Refills: 3 | Status: SHIPPED | OUTPATIENT
Start: 2024-12-06 | End: 2025-12-06

## 2024-12-06 RX ORDER — METFORMIN HYDROCHLORIDE 500 MG/1
500 TABLET ORAL
COMMUNITY
Start: 2024-11-12

## 2024-12-06 RX ORDER — METOPROLOL SUCCINATE 50 MG/1
50 TABLET, EXTENDED RELEASE ORAL DAILY
Qty: 90 TABLET | Refills: 3 | Status: SHIPPED | OUTPATIENT
Start: 2024-12-06 | End: 2025-12-06

## 2024-12-06 RX ORDER — NORTRIPTYLINE HYDROCHLORIDE 10 MG/1
20 CAPSULE ORAL
COMMUNITY
Start: 2024-11-11 | End: 2025-02-09

## 2024-12-06 RX ORDER — RANOLAZINE 500 MG/1
500 TABLET, EXTENDED RELEASE ORAL 2 TIMES DAILY
Qty: 180 TABLET | Refills: 3 | Status: SHIPPED | OUTPATIENT
Start: 2024-12-06 | End: 2025-12-06

## 2024-12-06 RX ORDER — FUROSEMIDE 20 MG/1
20 TABLET ORAL EVERY OTHER DAY
Qty: 45 TABLET | Refills: 3 | Status: SHIPPED | OUTPATIENT
Start: 2024-12-06

## 2024-12-06 RX ORDER — POTASSIUM CHLORIDE 750 MG/1
10 TABLET, FILM COATED, EXTENDED RELEASE ORAL DAILY
Qty: 90 TABLET | Refills: 3 | Status: SHIPPED | OUTPATIENT
Start: 2024-12-06 | End: 2025-12-06

## 2024-12-06 NOTE — PROGRESS NOTES
Patient:  Cesar Myles  YOB: 1945  MRN: [id number]       HPI:       Cesar Myles is a 79 y.o. male who returns today for cardiac follow-up.   He has a history of atherosclerotic heart disease with prior angioplasty and drug-eluting stenting of the first obtuse marginal branch circumflex in 2010. He was also noted to have 50-60% stenosis of LAD. There was normal left ventricular systolic function. He has a history of permanent atrial fibrillation being managed with a rate control strategy. He is chronically anticoagulated with Xarelto. He has been asymptomatic with regards to his atrial fibrillation. He has a history of hypertension and hyperlipidemia. A myocardial perfusion study in June 2018 was negative for myocardial infarction or ischemia. LV ejection fraction was 59%. He achieved 3.9 METs. An echocardiogram showed normal LV systolic function along with mildly dilated right atrium and right ventricle.  There was mild impairment of RV systolic function. Estimated RVSP was 32 mm Hg. He has some chronic exertional chest discomfort and shortness of breath. He was placed on Ranexa with improvement of his symptoms. He has a history of obstructive sleep apnea for which he uses CPAP.     He underwent extensive evaluation and treatment by Dr. Gigi Bhardwaj for venous insufficiency, varicose veins, and lymphedema. He had ultrasound-guided foam sclerotherapy treatments. He is using a lymphedema pump regularly. He wears graduated compression stockings. His edema has significantly improved.     At the time of his visit in June 2024 he described some intermittent episodes of chest tightness.  The episodes lasted approximately 10 to 15 minutes.  A follow-up exercise myocardial perfusion study June 20, 2024 was negative for ischemia or infarction.  Calculated LV ejection fraction of 68%.  Functional capacity was low and chronotropic response was significantly excessive suggesting deconditioning.    He follows  regularly with neurology (Dr. Sridhar Tabor) for tremor and idiopathic progressive polyneuropathy.  He takes nortriptyline and primidone.  He was seen at urgent care November 30, 2024 for multiple symptoms consistent with an upper respiratory infection.  He was diagnosed with acute bronchitis and placed on doxycycline.     He has been doing well since his last visit.  He and his wife are semiretired.  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  He is under some stress as his father-in-law has some ongoing health issues.  He denies any chest pain or shortness of breath.  He denies any orthopnea, PND, or increasing peripheral edema. He denies any palpitations, lightheadedness, near-syncope, or syncope. He denies any fever, chills, or cough. He denies any nausea, vomiting, or diaphoresis. He denies any hemoptysis, hematemesis, melena, or hematochezia.  Lab studies March 8, 2024 showed a normal CBC and CMP with exception of glucose 148.  Hemoglobin A1c was 7.2.  LDL cholesterol October 23, 2023 was 71.  Continue anticoagulation with Xarelto.  Continue Toprol-XL for rate control of atrial fibrillation.  He is on irbesartan for hypertension.  Other details as noted below.     The above portion of this note was dictated by me using voice recognition software. I personally performed the services described in the documentation. The scribe entering the documentation below was in my presence. I affirm that the information is both accurate and complete.      Objective:     There were no vitals filed for this visit.    Wt Readings from Last 4 Encounters:   06/06/24 115 kg (253 lb)   02/08/24 116 kg (256 lb 9.6 oz)   12/06/23 117 kg (257 lb)   11/09/23 114 kg (251 lb)       Allergies:     No Known Allergies       Medications:     Current Outpatient Medications   Medication Instructions    ascorbic acid (VITAMIN C) 500 mg, oral, Daily    aspirin 81 mg EC tablet 1 tablet, oral, Daily    atorvastatin (LIPITOR) 40 mg, oral,  Daily    biotin 100 mg, oral, Every 12 hours    cholecalciferol (Vitamin D-3) 5,000 Units tablet 1 tablet, oral, Daily    finasteride (Proscar) 5 mg tablet 0.5 tablets, oral, Daily    furosemide (LASIX) 20 mg, oral, Every other day    irbesartan (AVAPRO) 150 mg, oral, Daily    Klor-Con M10 10 mEq ER tablet 10 mEq, oral, Daily    metoprolol succinate XL (TOPROL-XL) 50 mg, oral, Daily    predniSONE (Deltasone) 20 mg tablet 2 tabs daily x3 days then 1 tablet daily for 2 days then 1/2 tablet for 2 days    primidone (Mysoline) 50 mg tablet 1 tablet, oral, 3 times daily    ranolazine (RANEXA) 500 mg, oral, 2 times daily    rivaroxaban (Xarelto) 10 mg tablet Due to backorder fill short term 10 mg 2 tabs daily    terazosin (HYTRIN) 5 mg, oral, Daily       Physical Examination:   GENERAL:  Well developed, well nourished, in no acute distress.  CHEST:  Symmetric and nontender.  NEURO/PSYCH:  Alert and oriented times three with approppriate behavior and responses.  NECK:  Supple, no JVD, no bruit.  LUNGS:  Clear to auscultation bilaterally, normal respiratory effort.  HEART:  Rate and rhythm irregularly irregular with no evident murmur, no gallop appreciated.        There are no rubs, clicks or heaves.  EXTREMITIES:  Warm with good color, no clubbing or cyanosis.  There is 2+ bilateral edema noted.  PERIPHERAL VASCULAR:  Pulses present and equally palpable; 2+ throughout.      Problem List:     Patient Active Problem List   Diagnosis    Atherosclerosis of native coronary artery of native heart with angina pectoris    Permanent atrial fibrillation (Multi)    Long term current use of anticoagulant therapy    Primary hypertension    Hyperlipidemia    Obstructive sleep apnea    Chronic venous insufficiency    Lymphedema    Morbid obesity (Multi)    Idiopathic chronic venous hypertension of left leg with inflammation    Idiopathic chronic venous hypertension of right leg with inflammation    Left chronic serous otitis media     Mixed conductive and sensorineural hearing loss of left ear with restricted hearing of right ear    Sensorineural hearing loss (SNHL) of right ear with restricted hearing of left ear    Otorrhea of left ear       Asessment:     Problem List Items Addressed This Visit             ICD-10-CM    Permanent atrial fibrillation (Multi) I48.21    Relevant Medications    rivaroxaban (Xarelto) 20 mg tablet    ranolazine (Ranexa) 500 mg 12 hr tablet    metoprolol succinate XL (Toprol-XL) 50 mg 24 hr tablet    Other Relevant Orders    CBC    Comprehensive Metabolic Panel    Follow Up In Cardiology    Long term current use of anticoagulant therapy Z79.01    Relevant Orders    CBC    Comprehensive Metabolic Panel    Follow Up In Cardiology    Primary hypertension I10    Relevant Medications    ranolazine (Ranexa) 500 mg 12 hr tablet    metoprolol succinate XL (Toprol-XL) 50 mg 24 hr tablet    irbesartan (Avapro) 150 mg tablet    furosemide (Lasix) 20 mg tablet    terazosin (Hytrin) 5 mg capsule    Other Relevant Orders    CBC    Comprehensive Metabolic Panel    Follow Up In Cardiology    Hyperlipidemia E78.5    Relevant Medications    potassium chloride CR (Klor-Con M10) 10 mEq ER tablet    atorvastatin (Lipitor) 40 mg tablet    Other Relevant Orders    Lipid Panel    CBC    Comprehensive Metabolic Panel    Follow Up In Cardiology    Lymphedema I89.0    Relevant Orders    CBC    Comprehensive Metabolic Panel    Follow Up In Cardiology    Idiopathic chronic venous hypertension of left leg with inflammation I87.322    Relevant Orders    CBC    Comprehensive Metabolic Panel    Follow Up In Cardiology    Idiopathic chronic venous hypertension of right leg with inflammation I87.321    Relevant Orders    CBC    Comprehensive Metabolic Panel    Follow Up In Cardiology    Idiopathic polyneuropathy G60.9    Relevant Orders    CBC    Comprehensive Metabolic Panel    Follow Up In Cardiology     Other Visit Diagnoses         Codes    Chest  pain, unspecified type     R07.9    Relevant Medications    ranolazine (Ranexa) 500 mg 12 hr tablet    Other Relevant Orders    CBC    Comprehensive Metabolic Panel    Follow Up In Cardiology

## 2024-12-09 ENCOUNTER — APPOINTMENT (OUTPATIENT)
Dept: OTOLARYNGOLOGY | Facility: CLINIC | Age: 79
End: 2024-12-09
Payer: MEDICARE

## 2024-12-09 DIAGNOSIS — H92.12 OTORRHEA OF LEFT EAR: Primary | ICD-10-CM

## 2024-12-09 DIAGNOSIS — H65.22 LEFT CHRONIC SEROUS OTITIS MEDIA: ICD-10-CM

## 2024-12-09 PROCEDURE — 99213 OFFICE O/P EST LOW 20 MIN: CPT | Performed by: OTOLARYNGOLOGY

## 2024-12-09 NOTE — PROGRESS NOTES
Patient returns.  Seeing him back today follow-up check on his left otorrhea with granulation tissue etc.  The patient is doing much better.  We have started him on Ciprodex and he is being seen in 1 month follow-up check.  Denying any worrisome issues.  All remaining head neck inquiry clear.  There have been no significant changes in past medical or past surgical histories except as mentioned.    Exam:  No acute distress.  Examination of the right ear is unremarkable. There is no evidence of middle ear effusion or abnormality of the external auditory canal, tympanic membrane, and external ear itself.  Left ear noted for intact tube.  There is a plug blocking and I removed that uneventfully under the microscope.  Nasal exam is clear anteriorly. The septum is relatively straight and the nasal mucosa is grossly unremarkable without evidence of any worrisome infection or polyp or mass. The oral cavity and oropharynx are unremarkable. There is no evidence of any gross lesion or mucosal irregularity throughout the structures. The neck is negative for mass or lymphadenopathy. The trachea and parotid region are clear free of obvious mass or tumor. Facial structures are grossly otherwise unremarkable as well.      Assessment and plan:  Doing much better following aggressive management for bloody otorrhea with granulation tissue left ear at the site of the tube.  Recheck 6 months, sooner with issue.  All questions were answered in this regard accordingly.

## 2024-12-11 ENCOUNTER — APPOINTMENT (OUTPATIENT)
Dept: PRIMARY CARE | Facility: CLINIC | Age: 79
End: 2024-12-11
Payer: MEDICARE

## 2024-12-11 VITALS
BODY MASS INDEX: 36.36 KG/M2 | SYSTOLIC BLOOD PRESSURE: 131 MMHG | HEART RATE: 68 BPM | HEIGHT: 70 IN | DIASTOLIC BLOOD PRESSURE: 78 MMHG | WEIGHT: 254 LBS

## 2024-12-11 DIAGNOSIS — G47.33 OBSTRUCTIVE SLEEP APNEA: ICD-10-CM

## 2024-12-11 DIAGNOSIS — I87.321 IDIOPATHIC CHRONIC VENOUS HYPERTENSION OF RIGHT LEG WITH INFLAMMATION: Primary | ICD-10-CM

## 2024-12-11 DIAGNOSIS — Z79.01 LONG TERM CURRENT USE OF ANTICOAGULANT THERAPY: ICD-10-CM

## 2024-12-11 DIAGNOSIS — I89.0 LYMPHEDEMA: ICD-10-CM

## 2024-12-11 DIAGNOSIS — I87.322 IDIOPATHIC CHRONIC VENOUS HYPERTENSION OF LEFT LEG WITH INFLAMMATION: ICD-10-CM

## 2024-12-11 PROCEDURE — 3078F DIAST BP <80 MM HG: CPT | Performed by: INTERNAL MEDICINE

## 2024-12-11 PROCEDURE — 1159F MED LIST DOCD IN RCRD: CPT | Performed by: INTERNAL MEDICINE

## 2024-12-11 PROCEDURE — 3075F SYST BP GE 130 - 139MM HG: CPT | Performed by: INTERNAL MEDICINE

## 2024-12-11 PROCEDURE — 99214 OFFICE O/P EST MOD 30 MIN: CPT | Performed by: INTERNAL MEDICINE

## 2024-12-11 PROCEDURE — 1160F RVW MEDS BY RX/DR IN RCRD: CPT | Performed by: INTERNAL MEDICINE

## 2024-12-11 NOTE — TELEPHONE ENCOUNTER
----- Message from Rafi Soto sent at 12/9/2024  5:18 PM EST -----  I received the message that Xarelto 20 mg is on backorder.  Okay from my standpoint to prescribe Xarelto 10 mg 2 tabs daily until the 20 mg dose becomes available.  Alternatively we can change him to Eliquis 5 mg twice daily for now.  Thanks.

## 2024-12-11 NOTE — PROGRESS NOTES
Chief Complaints:  Seen for follow-up after 6 months of procedures.    HPI:  79 years old male who has had advanced bilateral leg chronic venous insufficiency with also lymphedema has had endovenous ablation with image guided foam sclerotherapy which she tolerated well.  He also has had lymphedema therapy which she is continuing including the lymphedema pump at home.  He has been compliant with wearing the regular compression stockings almost daily.    Patient does have some leg swelling especially if he does not wear the stockings he says the leg swellings bothers him.  He is being active but he acknowledges that he has not been exercising as he should be.  He is caring his father-in-law who was diagnosed with cancer.    He is being on chronic anticoagulation.  But no history of any bleeding.    Patient's back pain is still present but relatively stable.    He says that he gets some leg cramps mainly at the end of the day.  He is continue his furosemide and he follows with a cardiologist regularly.  His leg discoloration is gradually improving.      ROS:  Respiratory:  No shortness of breath.  No cough, sinus congestion    Cardiovascular:    No chest pain.  No symptoms suggestive of claudication.  No cyanosis.  Palpitations, denies.    Neurologic:    Rare tingling/Numbness due to his chronic back problem.  Denies loss of strength.    Social History:  Tobacco Use:  No    Current Outpatient Medications on File Prior to Visit   Medication Sig Dispense Refill    ascorbic acid (Vitamin C) 500 mg tablet Take 1 tablet (500 mg) by mouth once daily.      aspirin 81 mg EC tablet Take 1 tablet (81 mg) by mouth once daily.      atorvastatin (Lipitor) 40 mg tablet Take 1 tablet (40 mg) by mouth once daily. 90 tablet 3    biotin 10 mg tablet Take 3 tablets (30 mg) by mouth once daily.      cholecalciferol (Vitamin D-3) 5,000 Units tablet Take 1 tablet (5,000 Units) by mouth once daily.      finasteride (Proscar) 5 mg tablet  Take 0.5 tablets (2.5 mg) by mouth once daily.      furosemide (Lasix) 20 mg tablet Take 1 tablet (20 mg) by mouth every other day. 45 tablet 3    irbesartan (Avapro) 150 mg tablet Take 1 tablet (150 mg) by mouth once daily. 90 tablet 3    metFORMIN (Glucophage) 500 mg tablet Take 1 tablet (500 mg) by mouth once daily with breakfast.      metoprolol succinate XL (Toprol-XL) 50 mg 24 hr tablet Take 1 tablet (50 mg) by mouth once daily. 90 tablet 3    multivitamin tablet Take 1 tablet by mouth once daily.      nortriptyline (Pamelor) 10 mg capsule Take 2 capsules (20 mg) by mouth.      omega-3 fatty acids (FISH OIL CONCENTRATE ORAL) Fish Oil OIL take 1 cap daily Quantity: 0 Refills: 0 Ordered: 28-Oct-2021 DO Active      potassium chloride CR (Klor-Con M10) 10 mEq ER tablet Take 1 tablet (10 mEq) by mouth once daily. 90 tablet 3    primidone (Mysoline) 50 mg tablet Take 1 tablet (50 mg) by mouth 3 times a day.      ranolazine (Ranexa) 500 mg 12 hr tablet Take 1 tablet (500 mg) by mouth 2 times a day. 180 tablet 3    rivaroxaban (Xarelto) 20 mg tablet Take 1 tablet (20 mg) by mouth once daily in the evening. Take with meals. Take with food. 90 tablet 3    terazosin (Hytrin) 5 mg capsule Take 1 capsule (5 mg) by mouth once daily. 90 capsule 3    [DISCONTINUED] atorvastatin (Lipitor) 40 mg tablet TAKE 1 TABLET ONCE DAILY 90 tablet 1    [DISCONTINUED] furosemide (Lasix) 20 mg tablet Take 1 tablet (20 mg) by mouth every other day. 45 tablet 1    [DISCONTINUED] irbesartan (Avapro) 150 mg tablet TAKE 1 TABLET ONCE DAILY 90 tablet 1    [DISCONTINUED] Klor-Con M10 10 mEq ER tablet TAKE 1 TABLET ONCE DAILY 90 tablet 1    [DISCONTINUED] metoprolol succinate XL (Toprol-XL) 50 mg 24 hr tablet Take 1 tablet (50 mg) by mouth once daily. 90 tablet 1    [DISCONTINUED] predniSONE (Deltasone) 20 mg tablet 2 tabs daily x3 days then 1 tablet daily for 2 days then 1/2 tablet for 2 days 9 tablet 0    [DISCONTINUED] ranolazine (Ranexa) 500 mg  "12 hr tablet TAKE 1 TABLET TWICE A  tablet 1    [DISCONTINUED] rivaroxaban (Xarelto) 10 mg tablet Due to backorder fill short term 10 mg 2 tabs daily 90 tablet 3    [DISCONTINUED] rivaroxaban (Xarelto) 20 mg tablet Take 1 tablet (20 mg) by mouth once daily in the evening. Take with meals. Take with food.      [DISCONTINUED] terazosin (Hytrin) 5 mg capsule Take 1 capsule (5 mg) by mouth once daily. 90 capsule 1     No current facility-administered medications on file prior to visit.        No Known Allergies     Examination:    Visit Vitals  /78 (BP Location: Left arm, Patient Position: Sitting, BP Cuff Size: Large adult)   Pulse 68   Ht 1.778 m (5' 10\")   Wt 115 kg (254 lb)   BMI 36.45 kg/m²   Smoking Status Former   BSA 2.38 m²        Moderate obese,, well-nourished with no apparent distress. Alert oriented  Skin: Normal turgor. No rash. Hand tremors noted  Head: Normocephalic, atraumatic.  Eyes: Pupils are equal, round,.  No pallor of conjunctivae.  Neck: Supple. No JVD.  No clubbing  Chest: Vesicular breathing Bilaterally moderate air entry. No wheezing. No crackles.  Heart: Rate is controlled.  Extremities:  Bilaterally 2+ dorsalis pedis pulses.  No calf tenderness. Homans sign is negative.  Neuro Exam: No focal signs. Gait is normal     Venous Exam:  Varicose veins in left calf absent ].  Varicose veins in left thigh absent ].  Varicose veins in right calf absent ].  Varicose veins in right thigh absent ].  Reticular veins in left calf present  Reticular veins in left back of the thigh [ present   Reticular veins in right back of the calf present  Reticular veins in right thigh absent ].  Telangiectasias in left calf [ present   Telangiectasias in left thigh [ absent ].  Telangiectasias in right calf [ present   Telangiectasias in right thigh absent ].  Right leg trace edema [ present   Left leg trace edema [ present   Chronic hyperpigmentation in left leg [ present   Chronic hyperpigmentation in " right leg [ present   Lipodermatosclerosis in right leg absent ].  Lipodermatosclerosis in left leg absent ].  Dermatitis in left leg [absent  Dermatitis in right leg [absent  Corona phlebectatica in left leg absent ].  Corona phlebectatica in right leg absent ].  Atrophe surendra in left leg absent ].  Atrophe surendra in right leg absent ].  Ulcer(s) in left leg absent ].  Ulcer(s) in right leg absent ].     Right leg CEAP C4aS     Left leg CEAP C4abS      Diagnosis/ Problems    Assessment/Plan :  Problem List Items Addressed This Visit       Long term current use of anticoagulant therapy    Relevant Orders    Vascular US Lower Extremity Vein Mapping Bilateral    Obstructive sleep apnea    Lymphedema    Relevant Orders    Vascular US Lower Extremity Vein Mapping Bilateral    Idiopathic chronic venous hypertension of left leg with inflammation    Relevant Orders    Vascular US Lower Extremity Vein Mapping Bilateral    Idiopathic chronic venous hypertension of right leg with inflammation - Primary    Relevant Orders    Vascular US Lower Extremity Vein Mapping Bilateral       No orders of the defined types were placed in this encounter.      Plan     Chronic venous insufficiency of bilateral lower extremities with other complications  Because of patient's continued symptoms and evidence of chronic venous insufficiency on exam in spite of the optimal management by the patient at home we will do a detailed ultrasound evaluation to decide any further interventions which are appropriate.    Discussions  Patient has significant improvement from the bilateral leg venous congestions patient also following the appropriate instructions including wearing the graduated compression stockings and lifestyle modifications.    Patient understands to continue to wear the graduated compression stockings and lifestyle modifications as we have discussed in the past and today.    Patient's chronic lymphedema and the importance of  continuing the lymphedema therapy as he has already been doing with the lymphedema pump was discussed.    Patient understands the importance of having regular activities including calf muscle pump such as stationary bicycle or regular walking.  He also understands importance of weight loss.    Patient states that he is comfortable and also understands the treatment process including the risk and the benefits.  He would like to have the treatment scheduled if the detailed venous ultrasound shows any abnormal refluxing superficial tributaries which needs to be treated.  I had a lengthy discussion with the patient about the benefits side effects and the risk of the treatments if they are appropriate and needed.  He will be communicated and also we will get the approval from the insurance if he needs the treatments.

## 2024-12-11 NOTE — TELEPHONE ENCOUNTER
Pt called back to office. Pt says that it's ok to send in script for Xarelto 10 mg tablets as Eliquis is the same cost. Med list updated and e-scribed.     He inquired about Pradaxa as his wife is also on this medication and cheaper cost for her.Pt will call insurance and  see if this medication is in formulary and cost for 90 day supply.

## 2024-12-11 NOTE — TELEPHONE ENCOUNTER
Vmm left for patient to call office back. Will need to make pt aware of Xarelto 20 mg tablets being on back order and if ok to send in Xarelto 10 mg tablets as he will need to be advised if he is taking 10 mg tablets he will need to take 2 tablets to equal 20 mg.    Also what pharmacy should script be sent to.

## 2024-12-11 NOTE — TELEPHONE ENCOUNTER
Pt states that he will check to see how much Eliquis would cost him. States that he will contact office back once he contacts his insurance as he may want to switch to Xarelto. Yesika [place]

## 2024-12-20 ENCOUNTER — TELEPHONE (OUTPATIENT)
Dept: CARDIOLOGY | Facility: CLINIC | Age: 79
End: 2024-12-20
Payer: MEDICARE

## 2024-12-20 NOTE — TELEPHONE ENCOUNTER
PA for Xarelto 10 mg 2 tablets daily submitted via [email or web address].  [place]: [email or web address].  Kacey Messer, CMA

## 2024-12-23 NOTE — TELEPHONE ENCOUNTER
PA denied for Xarelto 10 mg at 2 daily.  Appeal processed and faxed to [employer] [facility] [phone].  Patient notified of same by phone.  Patient states he was informed regarding the back order on 20 mg tablets but as also shipped a 90 day supply of Xarelto 20 mg tablets.  Called [facility]  [phone] spoke with pharmacy shayla Reis who states Xarelto 20 mg tablets is back on the back order list.  Kacey Messer, CMA

## 2024-12-30 ENCOUNTER — APPOINTMENT (OUTPATIENT)
Dept: OTOLARYNGOLOGY | Facility: CLINIC | Age: 79
End: 2024-12-30
Payer: MEDICARE

## 2025-02-03 ENCOUNTER — APPOINTMENT (OUTPATIENT)
Dept: PRIMARY CARE | Facility: CLINIC | Age: 80
End: 2025-02-03
Payer: MEDICARE

## 2025-02-07 ENCOUNTER — OFFICE VISIT (OUTPATIENT)
Dept: PULMONOLOGY | Age: 80
End: 2025-02-07
Payer: MEDICARE

## 2025-02-07 VITALS
OXYGEN SATURATION: 94 % | WEIGHT: 256 LBS | DIASTOLIC BLOOD PRESSURE: 70 MMHG | HEART RATE: 75 BPM | SYSTOLIC BLOOD PRESSURE: 132 MMHG | BODY MASS INDEX: 36.73 KG/M2

## 2025-02-07 DIAGNOSIS — G47.33 OSA (OBSTRUCTIVE SLEEP APNEA): Primary | ICD-10-CM

## 2025-02-07 DIAGNOSIS — E66.9 OBESITY (BMI 30-39.9): ICD-10-CM

## 2025-02-07 DIAGNOSIS — R06.02 SHORTNESS OF BREATH: ICD-10-CM

## 2025-02-07 PROCEDURE — 1159F MED LIST DOCD IN RCRD: CPT | Performed by: INTERNAL MEDICINE

## 2025-02-07 PROCEDURE — 1123F ACP DISCUSS/DSCN MKR DOCD: CPT | Performed by: INTERNAL MEDICINE

## 2025-02-07 PROCEDURE — 99204 OFFICE O/P NEW MOD 45 MIN: CPT | Performed by: INTERNAL MEDICINE

## 2025-02-07 RX ORDER — NORTRIPTYLINE HYDROCHLORIDE 10 MG/1
20 CAPSULE ORAL
COMMUNITY
Start: 2024-11-11 | End: 2025-02-09

## 2025-02-07 NOTE — PROGRESS NOTES
inhaler . No PFT , CXR  done  recently. He said he will go for it .  - XR CHEST (2 VW); Future  - Full PFT Study With Bronchodilator; Future      Return in about 4 weeks (around 3/7/2025).      Carlos A Ba MD

## 2025-03-05 ENCOUNTER — APPOINTMENT (OUTPATIENT)
Dept: PRIMARY CARE | Facility: CLINIC | Age: 80
End: 2025-03-05
Payer: MEDICARE

## 2025-03-05 DIAGNOSIS — I87.321 IDIOPATHIC CHRONIC VENOUS HYPERTENSION OF RIGHT LEG WITH INFLAMMATION: ICD-10-CM

## 2025-03-05 DIAGNOSIS — Z79.01 LONG TERM CURRENT USE OF ANTICOAGULANT THERAPY: ICD-10-CM

## 2025-03-05 DIAGNOSIS — I89.0 LYMPHEDEMA: ICD-10-CM

## 2025-03-05 DIAGNOSIS — I87.322 IDIOPATHIC CHRONIC VENOUS HYPERTENSION OF LEFT LEG WITH INFLAMMATION: ICD-10-CM

## 2025-03-05 PROCEDURE — 93970 EXTREMITY STUDY: CPT | Performed by: INTERNAL MEDICINE

## 2025-03-07 ENCOUNTER — HOSPITAL ENCOUNTER (OUTPATIENT)
Dept: PULMONOLOGY | Age: 80
Discharge: HOME | End: 2025-03-07
Payer: MEDICARE

## 2025-03-07 ENCOUNTER — HOSPITAL ENCOUNTER (OUTPATIENT)
Dept: GENERAL RADIOLOGY | Age: 80
Discharge: HOME | End: 2025-03-09
Attending: INTERNAL MEDICINE
Payer: MEDICARE

## 2025-03-07 DIAGNOSIS — R06.02 SHORTNESS OF BREATH: ICD-10-CM

## 2025-03-07 PROCEDURE — 94060 EVALUATION OF WHEEZING: CPT

## 2025-03-07 PROCEDURE — 94729 DIFFUSING CAPACITY: CPT

## 2025-03-07 PROCEDURE — 94726 PLETHYSMOGRAPHY LUNG VOLUMES: CPT

## 2025-03-07 PROCEDURE — 6360000002 HC RX W HCPCS

## 2025-03-07 PROCEDURE — 71046 X-RAY EXAM CHEST 2 VIEWS: CPT

## 2025-03-07 RX ORDER — ALBUTEROL SULFATE 0.83 MG/ML
SOLUTION RESPIRATORY (INHALATION)
Status: COMPLETED
Start: 2025-03-07 | End: 2025-03-07

## 2025-03-07 RX ADMIN — ALBUTEROL SULFATE 2.5 MG: 2.5 SOLUTION RESPIRATORY (INHALATION) at 08:05

## 2025-03-11 ENCOUNTER — OFFICE VISIT (OUTPATIENT)
Dept: PULMONOLOGY | Age: 80
End: 2025-03-11
Payer: MEDICARE

## 2025-03-11 VITALS
OXYGEN SATURATION: 95 % | SYSTOLIC BLOOD PRESSURE: 138 MMHG | DIASTOLIC BLOOD PRESSURE: 80 MMHG | WEIGHT: 259 LBS | HEART RATE: 55 BPM | BODY MASS INDEX: 37.16 KG/M2

## 2025-03-11 DIAGNOSIS — E66.9 OBESITY (BMI 30-39.9): ICD-10-CM

## 2025-03-11 DIAGNOSIS — G47.33 OSA (OBSTRUCTIVE SLEEP APNEA): Primary | ICD-10-CM

## 2025-03-11 DIAGNOSIS — J44.9 CHRONIC OBSTRUCTIVE PULMONARY DISEASE, UNSPECIFIED COPD TYPE (HCC): ICD-10-CM

## 2025-03-11 PROCEDURE — 1159F MED LIST DOCD IN RCRD: CPT | Performed by: INTERNAL MEDICINE

## 2025-03-11 PROCEDURE — 99214 OFFICE O/P EST MOD 30 MIN: CPT | Performed by: INTERNAL MEDICINE

## 2025-03-11 PROCEDURE — 1123F ACP DISCUSS/DSCN MKR DOCD: CPT | Performed by: INTERNAL MEDICINE

## 2025-03-11 RX ORDER — ALBUTEROL SULFATE 90 UG/1
2 INHALANT RESPIRATORY (INHALATION) EVERY 6 HOURS PRN
Qty: 18 G | Refills: 3 | Status: SHIPPED | OUTPATIENT
Start: 2025-03-11

## 2025-03-11 ASSESSMENT — ENCOUNTER SYMPTOMS
RHINORRHEA: 0
DIARRHEA: 0
WHEEZING: 0
SHORTNESS OF BREATH: 1
SORE THROAT: 0
ABDOMINAL PAIN: 0
NAUSEA: 0
CHEST TIGHTNESS: 0
VOICE CHANGE: 0
COUGH: 0
VOMITING: 0
EYE ITCHING: 0

## 2025-03-11 NOTE — PROGRESS NOTES
levels.Findings are suggestive COPD. No acute cardiopulmonary process       PFT 3/9/25  Spirometry shows FVC AND FEV1 are normal though FEV1/FVC and mid flow decreased suggest possible obstruction at terminal airflow.  Post Bronchodilator study shows there is no significant response to bronchodilator.  FVC and FEV1 but there is positive response at terminal airflow  Static Lung volume study shows air trapping and hyperinflation.  Diffusion capacity is normal.  Airway resistance is normal.  Flow-volume loop suggest obstructive pattern.    - albuterol sulfate HFA (PROVENTIL;VENTOLIN;PROAIR) 108 (90 Base) MCG/ACT inhaler; Inhale 2 puffs into the lungs every 6 hours as needed for Wheezing  Dispense: 18 g; Refill: 3      Return in about 4 months (around 7/11/2025) for COPD, yamilet.      Carlos A Ba MD

## 2025-03-16 ENCOUNTER — HOSPITAL ENCOUNTER (INPATIENT)
Facility: HOSPITAL | Age: 80
End: 2025-03-16
Attending: STUDENT IN AN ORGANIZED HEALTH CARE EDUCATION/TRAINING PROGRAM | Admitting: STUDENT IN AN ORGANIZED HEALTH CARE EDUCATION/TRAINING PROGRAM
Payer: MEDICARE

## 2025-03-16 ENCOUNTER — APPOINTMENT (OUTPATIENT)
Dept: RADIOLOGY | Facility: HOSPITAL | Age: 80
End: 2025-03-16
Payer: MEDICARE

## 2025-03-16 ENCOUNTER — APPOINTMENT (OUTPATIENT)
Dept: CARDIOLOGY | Facility: HOSPITAL | Age: 80
End: 2025-03-16
Payer: MEDICARE

## 2025-03-16 VITALS
HEIGHT: 70 IN | DIASTOLIC BLOOD PRESSURE: 99 MMHG | WEIGHT: 255 LBS | RESPIRATION RATE: 22 BRPM | BODY MASS INDEX: 36.51 KG/M2 | TEMPERATURE: 97 F | HEART RATE: 75 BPM | OXYGEN SATURATION: 95 % | SYSTOLIC BLOOD PRESSURE: 179 MMHG

## 2025-03-16 DIAGNOSIS — R07.9 CHEST PAIN: ICD-10-CM

## 2025-03-16 DIAGNOSIS — E11.9 TYPE 2 DIABETES MELLITUS WITHOUT COMPLICATION, WITHOUT LONG-TERM CURRENT USE OF INSULIN: ICD-10-CM

## 2025-03-16 DIAGNOSIS — I21.4 NSTEMI (NON-ST ELEVATED MYOCARDIAL INFARCTION) (MULTI): Primary | ICD-10-CM

## 2025-03-16 DIAGNOSIS — I48.91 ATRIAL FIBRILLATION, CONTROLLED (MULTI): ICD-10-CM

## 2025-03-16 DIAGNOSIS — I25.119 ATHEROSCLEROSIS OF NATIVE CORONARY ARTERY OF NATIVE HEART WITH ANGINA PECTORIS: ICD-10-CM

## 2025-03-16 LAB
ALBUMIN SERPL BCP-MCNC: 4.5 G/DL (ref 3.4–5)
ALP SERPL-CCNC: 66 U/L (ref 33–136)
ALT SERPL W P-5'-P-CCNC: 33 U/L (ref 10–52)
ANION GAP SERPL CALC-SCNC: 12 MMOL/L (ref 10–20)
APTT PPP: 31 SECONDS (ref 26–36)
AST SERPL W P-5'-P-CCNC: 25 U/L (ref 9–39)
ATRIAL RATE: 156 BPM
ATRIAL RATE: 468 BPM
BASOPHILS # BLD AUTO: 0.02 X10*3/UL (ref 0–0.1)
BASOPHILS NFR BLD AUTO: 0.3 %
BILIRUB SERPL-MCNC: 0.8 MG/DL (ref 0–1.2)
BNP SERPL-MCNC: 100 PG/ML (ref 0–99)
BUN SERPL-MCNC: 15 MG/DL (ref 6–23)
CALCIUM SERPL-MCNC: 9.6 MG/DL (ref 8.6–10.3)
CARDIAC TROPONIN I PNL SERPL HS: 123 NG/L (ref 0–20)
CARDIAC TROPONIN I PNL SERPL HS: 129 NG/L (ref 0–20)
CARDIAC TROPONIN I PNL SERPL HS: 192 NG/L (ref 0–20)
CHLORIDE SERPL-SCNC: 101 MMOL/L (ref 98–107)
CO2 SERPL-SCNC: 27 MMOL/L (ref 21–32)
CREAT SERPL-MCNC: 1.06 MG/DL (ref 0.5–1.3)
EGFRCR SERPLBLD CKD-EPI 2021: 71 ML/MIN/1.73M*2
EOSINOPHIL # BLD AUTO: 0.01 X10*3/UL (ref 0–0.4)
EOSINOPHIL NFR BLD AUTO: 0.2 %
ERYTHROCYTE [DISTWIDTH] IN BLOOD BY AUTOMATED COUNT: 12.5 % (ref 11.5–14.5)
GLUCOSE BLD MANUAL STRIP-MCNC: 137 MG/DL (ref 74–99)
GLUCOSE BLD MANUAL STRIP-MCNC: 166 MG/DL (ref 74–99)
GLUCOSE SERPL-MCNC: 156 MG/DL (ref 74–99)
HCT VFR BLD AUTO: 43.8 % (ref 41–52)
HGB BLD-MCNC: 15.4 G/DL (ref 13.5–17.5)
IMM GRANULOCYTES # BLD AUTO: 0.01 X10*3/UL (ref 0–0.5)
IMM GRANULOCYTES NFR BLD AUTO: 0.2 % (ref 0–0.9)
INR PPP: 1.2 (ref 0.9–1.1)
LYMPHOCYTES # BLD AUTO: 1.31 X10*3/UL (ref 0.8–3)
LYMPHOCYTES NFR BLD AUTO: 22.5 %
MAGNESIUM SERPL-MCNC: 1.95 MG/DL (ref 1.6–2.4)
MCH RBC QN AUTO: 33.6 PG (ref 26–34)
MCHC RBC AUTO-ENTMCNC: 35.2 G/DL (ref 32–36)
MCV RBC AUTO: 96 FL (ref 80–100)
MONOCYTES # BLD AUTO: 0.67 X10*3/UL (ref 0.05–0.8)
MONOCYTES NFR BLD AUTO: 11.5 %
NEUTROPHILS # BLD AUTO: 3.81 X10*3/UL (ref 1.6–5.5)
NEUTROPHILS NFR BLD AUTO: 65.3 %
NRBC BLD-RTO: 0 /100 WBCS (ref 0–0)
PLATELET # BLD AUTO: 135 X10*3/UL (ref 150–450)
POTASSIUM SERPL-SCNC: 4.4 MMOL/L (ref 3.5–5.3)
PROT SERPL-MCNC: 7.1 G/DL (ref 6.4–8.2)
PROTHROMBIN TIME: 12.8 SECONDS (ref 9.8–12.4)
Q ONSET: 223 MS
Q ONSET: 226 MS
QRS COUNT: 10 BEATS
QRS COUNT: 11 BEATS
QRS DURATION: 112 MS
QRS DURATION: 98 MS
QT INTERVAL: 386 MS
QT INTERVAL: 386 MS
QTC CALCULATION(BAZETT): 395 MS
QTC CALCULATION(BAZETT): 395 MS
QTC FREDERICIA: 392 MS
QTC FREDERICIA: 392 MS
R AXIS: -14 DEGREES
R AXIS: 10 DEGREES
RBC # BLD AUTO: 4.58 X10*6/UL (ref 4.5–5.9)
SODIUM SERPL-SCNC: 136 MMOL/L (ref 136–145)
T AXIS: 14 DEGREES
T AXIS: 15 DEGREES
T OFFSET: 416 MS
T OFFSET: 419 MS
UFH PPP CHRO-ACNC: 0.3 IU/ML
VENTRICULAR RATE: 63 BPM
VENTRICULAR RATE: 63 BPM
WBC # BLD AUTO: 5.8 X10*3/UL (ref 4.4–11.3)

## 2025-03-16 PROCEDURE — 2500000001 HC RX 250 WO HCPCS SELF ADMINISTERED DRUGS (ALT 637 FOR MEDICARE OP): Performed by: STUDENT IN AN ORGANIZED HEALTH CARE EDUCATION/TRAINING PROGRAM

## 2025-03-16 PROCEDURE — 93005 ELECTROCARDIOGRAM TRACING: CPT

## 2025-03-16 PROCEDURE — 84484 ASSAY OF TROPONIN QUANT: CPT | Performed by: STUDENT IN AN ORGANIZED HEALTH CARE EDUCATION/TRAINING PROGRAM

## 2025-03-16 PROCEDURE — 83036 HEMOGLOBIN GLYCOSYLATED A1C: CPT | Mod: ELYLAB | Performed by: STUDENT IN AN ORGANIZED HEALTH CARE EDUCATION/TRAINING PROGRAM

## 2025-03-16 PROCEDURE — 2500000001 HC RX 250 WO HCPCS SELF ADMINISTERED DRUGS (ALT 637 FOR MEDICARE OP): Performed by: NURSE PRACTITIONER

## 2025-03-16 PROCEDURE — 2500000004 HC RX 250 GENERAL PHARMACY W/ HCPCS (ALT 636 FOR OP/ED): Performed by: STUDENT IN AN ORGANIZED HEALTH CARE EDUCATION/TRAINING PROGRAM

## 2025-03-16 PROCEDURE — 85730 THROMBOPLASTIN TIME PARTIAL: CPT | Performed by: NURSE PRACTITIONER

## 2025-03-16 PROCEDURE — 71045 X-RAY EXAM CHEST 1 VIEW: CPT | Performed by: STUDENT IN AN ORGANIZED HEALTH CARE EDUCATION/TRAINING PROGRAM

## 2025-03-16 PROCEDURE — 36415 COLL VENOUS BLD VENIPUNCTURE: CPT | Performed by: NURSE PRACTITIONER

## 2025-03-16 PROCEDURE — 84484 ASSAY OF TROPONIN QUANT: CPT | Performed by: NURSE PRACTITIONER

## 2025-03-16 PROCEDURE — 85520 HEPARIN ASSAY: CPT | Performed by: STUDENT IN AN ORGANIZED HEALTH CARE EDUCATION/TRAINING PROGRAM

## 2025-03-16 PROCEDURE — 1100000001 HC PRIVATE ROOM DAILY

## 2025-03-16 PROCEDURE — 2500000002 HC RX 250 W HCPCS SELF ADMINISTERED DRUGS (ALT 637 FOR MEDICARE OP, ALT 636 FOR OP/ED): Performed by: STUDENT IN AN ORGANIZED HEALTH CARE EDUCATION/TRAINING PROGRAM

## 2025-03-16 PROCEDURE — 2500000004 HC RX 250 GENERAL PHARMACY W/ HCPCS (ALT 636 FOR OP/ED): Performed by: NURSE PRACTITIONER

## 2025-03-16 PROCEDURE — 84075 ASSAY ALKALINE PHOSPHATASE: CPT | Performed by: NURSE PRACTITIONER

## 2025-03-16 PROCEDURE — 99223 1ST HOSP IP/OBS HIGH 75: CPT | Performed by: STUDENT IN AN ORGANIZED HEALTH CARE EDUCATION/TRAINING PROGRAM

## 2025-03-16 PROCEDURE — 99285 EMERGENCY DEPT VISIT HI MDM: CPT | Performed by: STUDENT IN AN ORGANIZED HEALTH CARE EDUCATION/TRAINING PROGRAM

## 2025-03-16 PROCEDURE — 85025 COMPLETE CBC W/AUTO DIFF WBC: CPT | Performed by: NURSE PRACTITIONER

## 2025-03-16 PROCEDURE — 83735 ASSAY OF MAGNESIUM: CPT | Performed by: NURSE PRACTITIONER

## 2025-03-16 PROCEDURE — 94660 CPAP INITIATION&MGMT: CPT

## 2025-03-16 PROCEDURE — 85610 PROTHROMBIN TIME: CPT | Performed by: NURSE PRACTITIONER

## 2025-03-16 PROCEDURE — 71045 X-RAY EXAM CHEST 1 VIEW: CPT

## 2025-03-16 PROCEDURE — 82947 ASSAY GLUCOSE BLOOD QUANT: CPT

## 2025-03-16 PROCEDURE — 83880 ASSAY OF NATRIURETIC PEPTIDE: CPT | Performed by: NURSE PRACTITIONER

## 2025-03-16 RX ORDER — PRIMIDONE 50 MG/1
50 TABLET ORAL 3 TIMES DAILY
Status: DISCONTINUED | OUTPATIENT
Start: 2025-03-16 | End: 2025-03-18 | Stop reason: HOSPADM

## 2025-03-16 RX ORDER — HYDRALAZINE HYDROCHLORIDE 20 MG/ML
5 INJECTION INTRAMUSCULAR; INTRAVENOUS EVERY 6 HOURS PRN
Status: DISCONTINUED | OUTPATIENT
Start: 2025-03-16 | End: 2025-03-18 | Stop reason: HOSPADM

## 2025-03-16 RX ORDER — NITROGLYCERIN 0.4 MG/1
0.4 TABLET SUBLINGUAL EVERY 5 MIN PRN
Status: DISCONTINUED | OUTPATIENT
Start: 2025-03-16 | End: 2025-03-18 | Stop reason: HOSPADM

## 2025-03-16 RX ORDER — FUROSEMIDE 40 MG/1
20 TABLET ORAL EVERY OTHER DAY
Status: DISCONTINUED | OUTPATIENT
Start: 2025-03-17 | End: 2025-03-18 | Stop reason: HOSPADM

## 2025-03-16 RX ORDER — ACETAMINOPHEN 160 MG/5ML
650 SOLUTION ORAL EVERY 4 HOURS PRN
Status: DISCONTINUED | OUTPATIENT
Start: 2025-03-16 | End: 2025-03-18 | Stop reason: HOSPADM

## 2025-03-16 RX ORDER — RANOLAZINE 500 MG/1
500 TABLET, EXTENDED RELEASE ORAL 2 TIMES DAILY
Status: DISCONTINUED | OUTPATIENT
Start: 2025-03-16 | End: 2025-03-18 | Stop reason: HOSPADM

## 2025-03-16 RX ORDER — ASPIRIN 81 MG/1
81 TABLET ORAL DAILY
Status: DISCONTINUED | OUTPATIENT
Start: 2025-03-17 | End: 2025-03-18 | Stop reason: HOSPADM

## 2025-03-16 RX ORDER — HEPARIN SODIUM 10000 [USP'U]/100ML
0-4000 INJECTION, SOLUTION INTRAVENOUS CONTINUOUS
Status: DISCONTINUED | OUTPATIENT
Start: 2025-03-16 | End: 2025-03-16

## 2025-03-16 RX ORDER — METOPROLOL SUCCINATE 50 MG/1
50 TABLET, EXTENDED RELEASE ORAL DAILY
Status: DISCONTINUED | OUTPATIENT
Start: 2025-03-17 | End: 2025-03-18 | Stop reason: HOSPADM

## 2025-03-16 RX ORDER — POLYETHYLENE GLYCOL 3350 17 G/17G
17 POWDER, FOR SOLUTION ORAL DAILY PRN
Status: DISCONTINUED | OUTPATIENT
Start: 2025-03-16 | End: 2025-03-18 | Stop reason: HOSPADM

## 2025-03-16 RX ORDER — LOSARTAN POTASSIUM 50 MG/1
50 TABLET ORAL DAILY
Status: DISCONTINUED | OUTPATIENT
Start: 2025-03-17 | End: 2025-03-17

## 2025-03-16 RX ORDER — LABETALOL HYDROCHLORIDE 5 MG/ML
10 INJECTION, SOLUTION INTRAVENOUS EVERY 6 HOURS PRN
Status: DISCONTINUED | OUTPATIENT
Start: 2025-03-16 | End: 2025-03-18 | Stop reason: HOSPADM

## 2025-03-16 RX ORDER — ACETAMINOPHEN 325 MG/1
650 TABLET ORAL EVERY 4 HOURS PRN
Status: DISCONTINUED | OUTPATIENT
Start: 2025-03-16 | End: 2025-03-18 | Stop reason: HOSPADM

## 2025-03-16 RX ORDER — DEXTROSE 50 % IN WATER (D50W) INTRAVENOUS SYRINGE
25
Status: DISCONTINUED | OUTPATIENT
Start: 2025-03-16 | End: 2025-03-18 | Stop reason: HOSPADM

## 2025-03-16 RX ORDER — FINASTERIDE 5 MG/1
2.5 TABLET, FILM COATED ORAL DAILY
Status: DISCONTINUED | OUTPATIENT
Start: 2025-03-17 | End: 2025-03-18 | Stop reason: HOSPADM

## 2025-03-16 RX ORDER — NAPROXEN SODIUM 220 MG/1
324 TABLET, FILM COATED ORAL ONCE
Status: COMPLETED | OUTPATIENT
Start: 2025-03-16 | End: 2025-03-16

## 2025-03-16 RX ORDER — ACETAMINOPHEN 650 MG/1
650 SUPPOSITORY RECTAL EVERY 4 HOURS PRN
Status: DISCONTINUED | OUTPATIENT
Start: 2025-03-16 | End: 2025-03-18 | Stop reason: HOSPADM

## 2025-03-16 RX ORDER — DEXTROSE 50 % IN WATER (D50W) INTRAVENOUS SYRINGE
12.5
Status: DISCONTINUED | OUTPATIENT
Start: 2025-03-16 | End: 2025-03-18 | Stop reason: HOSPADM

## 2025-03-16 RX ORDER — MORPHINE SULFATE 2 MG/ML
2 INJECTION, SOLUTION INTRAMUSCULAR; INTRAVENOUS EVERY 4 HOURS PRN
Status: DISCONTINUED | OUTPATIENT
Start: 2025-03-16 | End: 2025-03-18 | Stop reason: HOSPADM

## 2025-03-16 RX ORDER — INSULIN LISPRO 100 [IU]/ML
0-10 INJECTION, SOLUTION INTRAVENOUS; SUBCUTANEOUS
Status: DISCONTINUED | OUTPATIENT
Start: 2025-03-16 | End: 2025-03-18 | Stop reason: HOSPADM

## 2025-03-16 RX ORDER — TERAZOSIN 5 MG/1
5 CAPSULE ORAL DAILY
Status: DISCONTINUED | OUTPATIENT
Start: 2025-03-17 | End: 2025-03-18 | Stop reason: HOSPADM

## 2025-03-16 RX ORDER — ATORVASTATIN CALCIUM 20 MG/1
40 TABLET, FILM COATED ORAL NIGHTLY
Status: DISCONTINUED | OUTPATIENT
Start: 2025-03-16 | End: 2025-03-18 | Stop reason: HOSPADM

## 2025-03-16 RX ORDER — NORTRIPTYLINE HYDROCHLORIDE 10 MG/1
20 CAPSULE ORAL NIGHTLY
Status: DISCONTINUED | OUTPATIENT
Start: 2025-03-16 | End: 2025-03-18 | Stop reason: HOSPADM

## 2025-03-16 RX ORDER — HEPARIN SODIUM 10000 [USP'U]/100ML
0-4000 INJECTION, SOLUTION INTRAVENOUS CONTINUOUS
Status: DISCONTINUED | OUTPATIENT
Start: 2025-03-16 | End: 2025-03-17

## 2025-03-16 RX ADMIN — NORTRIPTYLINE HYDROCHLORIDE 20 MG: 10 CAPSULE ORAL at 20:07

## 2025-03-16 RX ADMIN — LABETALOL HYDROCHLORIDE 10 MG: 5 INJECTION, SOLUTION INTRAVENOUS at 17:35

## 2025-03-16 RX ADMIN — PRIMIDONE 50 MG: 50 TABLET ORAL at 20:07

## 2025-03-16 RX ADMIN — INSULIN LISPRO 2 UNITS: 100 INJECTION, SOLUTION INTRAVENOUS; SUBCUTANEOUS at 20:07

## 2025-03-16 RX ADMIN — RANOLAZINE 500 MG: 500 TABLET, FILM COATED, EXTENDED RELEASE ORAL at 20:07

## 2025-03-16 RX ADMIN — HEPARIN SODIUM 1000 UNITS/HR: 10000 INJECTION, SOLUTION INTRAVENOUS at 19:24

## 2025-03-16 RX ADMIN — ATORVASTATIN CALCIUM 40 MG: 20 TABLET, FILM COATED ORAL at 20:07

## 2025-03-16 RX ADMIN — HYDRALAZINE HYDROCHLORIDE 5 MG: 20 INJECTION INTRAMUSCULAR; INTRAVENOUS at 15:51

## 2025-03-16 RX ADMIN — ASPIRIN 324 MG: 81 TABLET, CHEWABLE ORAL at 13:34

## 2025-03-16 RX ADMIN — HEPARIN SODIUM 1000 UNITS/HR: 10000 INJECTION, SOLUTION INTRAVENOUS at 14:52

## 2025-03-16 SDOH — HEALTH STABILITY: PHYSICAL HEALTH
HOW OFTEN DO YOU NEED TO HAVE SOMEONE HELP YOU WHEN YOU READ INSTRUCTIONS, PAMPHLETS, OR OTHER WRITTEN MATERIAL FROM YOUR DOCTOR OR PHARMACY?: SOMETIMES

## 2025-03-16 SDOH — HEALTH STABILITY: MENTAL HEALTH
DO YOU FEEL STRESS - TENSE, RESTLESS, NERVOUS, OR ANXIOUS, OR UNABLE TO SLEEP AT NIGHT BECAUSE YOUR MIND IS TROUBLED ALL THE TIME - THESE DAYS?: NOT AT ALL

## 2025-03-16 SDOH — SOCIAL STABILITY: SOCIAL INSECURITY
WITHIN THE LAST YEAR, HAVE YOU BEEN KICKED, HIT, SLAPPED, OR OTHERWISE PHYSICALLY HURT BY YOUR PARTNER OR EX-PARTNER?: NO

## 2025-03-16 SDOH — ECONOMIC STABILITY: TRANSPORTATION INSECURITY: IN THE PAST 12 MONTHS, HAS LACK OF TRANSPORTATION KEPT YOU FROM MEDICAL APPOINTMENTS OR FROM GETTING MEDICATIONS?: NO

## 2025-03-16 SDOH — ECONOMIC STABILITY: FOOD INSECURITY: HOW HARD IS IT FOR YOU TO PAY FOR THE VERY BASICS LIKE FOOD, HOUSING, MEDICAL CARE, AND HEATING?: NOT HARD AT ALL

## 2025-03-16 SDOH — SOCIAL STABILITY: SOCIAL NETWORK
IN A TYPICAL WEEK, HOW MANY TIMES DO YOU TALK ON THE PHONE WITH FAMILY, FRIENDS, OR NEIGHBORS?: MORE THAN THREE TIMES A WEEK

## 2025-03-16 SDOH — SOCIAL STABILITY: SOCIAL INSECURITY: WITHIN THE LAST YEAR, HAVE YOU BEEN AFRAID OF YOUR PARTNER OR EX-PARTNER?: NO

## 2025-03-16 SDOH — SOCIAL STABILITY: SOCIAL INSECURITY: DO YOU FEEL UNSAFE GOING BACK TO THE PLACE WHERE YOU ARE LIVING?: NO

## 2025-03-16 SDOH — SOCIAL STABILITY: SOCIAL INSECURITY: ARE THERE ANY APPARENT SIGNS OF INJURIES/BEHAVIORS THAT COULD BE RELATED TO ABUSE/NEGLECT?: NO

## 2025-03-16 SDOH — ECONOMIC STABILITY: HOUSING INSECURITY: AT ANY TIME IN THE PAST 12 MONTHS, WERE YOU HOMELESS OR LIVING IN A SHELTER (INCLUDING NOW)?: NO

## 2025-03-16 SDOH — SOCIAL STABILITY: SOCIAL NETWORK: HOW OFTEN DO YOU ATTEND CHURCH OR RELIGIOUS SERVICES?: 1 TO 4 TIMES PER YEAR

## 2025-03-16 SDOH — ECONOMIC STABILITY: INCOME INSECURITY: IN THE PAST 12 MONTHS HAS THE ELECTRIC, GAS, OIL, OR WATER COMPANY THREATENED TO SHUT OFF SERVICES IN YOUR HOME?: NO

## 2025-03-16 SDOH — ECONOMIC STABILITY: HOUSING INSECURITY: IN THE LAST 12 MONTHS, WAS THERE A TIME WHEN YOU WERE NOT ABLE TO PAY THE MORTGAGE OR RENT ON TIME?: NO

## 2025-03-16 SDOH — SOCIAL STABILITY: SOCIAL INSECURITY: WITHIN THE LAST YEAR, HAVE YOU BEEN HUMILIATED OR EMOTIONALLY ABUSED IN OTHER WAYS BY YOUR PARTNER OR EX-PARTNER?: NO

## 2025-03-16 SDOH — ECONOMIC STABILITY: FOOD INSECURITY: WITHIN THE PAST 12 MONTHS, YOU WORRIED THAT YOUR FOOD WOULD RUN OUT BEFORE YOU GOT THE MONEY TO BUY MORE.: NEVER TRUE

## 2025-03-16 SDOH — ECONOMIC STABILITY: FOOD INSECURITY: WITHIN THE PAST 12 MONTHS, THE FOOD YOU BOUGHT JUST DIDN'T LAST AND YOU DIDN'T HAVE MONEY TO GET MORE.: NEVER TRUE

## 2025-03-16 SDOH — HEALTH STABILITY: PHYSICAL HEALTH: ON AVERAGE, HOW MANY MINUTES DO YOU ENGAGE IN EXERCISE AT THIS LEVEL?: 20 MIN

## 2025-03-16 SDOH — SOCIAL STABILITY: SOCIAL INSECURITY: HAVE YOU HAD ANY THOUGHTS OF HARMING ANYONE ELSE?: NO

## 2025-03-16 SDOH — SOCIAL STABILITY: SOCIAL NETWORK: HOW OFTEN DO YOU GET TOGETHER WITH FRIENDS OR RELATIVES?: MORE THAN THREE TIMES A WEEK

## 2025-03-16 SDOH — SOCIAL STABILITY: SOCIAL INSECURITY: HAS ANYONE EVER THREATENED TO HURT YOUR FAMILY OR YOUR PETS?: NO

## 2025-03-16 SDOH — SOCIAL STABILITY: SOCIAL INSECURITY: ARE YOU MARRIED, WIDOWED, DIVORCED, SEPARATED, NEVER MARRIED, OR LIVING WITH A PARTNER?: MARRIED

## 2025-03-16 SDOH — SOCIAL STABILITY: SOCIAL INSECURITY
WITHIN THE LAST YEAR, HAVE YOU BEEN RAPED OR FORCED TO HAVE ANY KIND OF SEXUAL ACTIVITY BY YOUR PARTNER OR EX-PARTNER?: NO

## 2025-03-16 SDOH — SOCIAL STABILITY: SOCIAL NETWORK: HOW OFTEN DO YOU ATTEND MEETINGS OF THE CLUBS OR ORGANIZATIONS YOU BELONG TO?: NEVER

## 2025-03-16 SDOH — ECONOMIC STABILITY: HOUSING INSECURITY: IN THE PAST 12 MONTHS, HOW MANY TIMES HAVE YOU MOVED WHERE YOU WERE LIVING?: 0

## 2025-03-16 SDOH — SOCIAL STABILITY: SOCIAL NETWORK
DO YOU BELONG TO ANY CLUBS OR ORGANIZATIONS SUCH AS CHURCH GROUPS, UNIONS, FRATERNAL OR ATHLETIC GROUPS, OR SCHOOL GROUPS?: NO

## 2025-03-16 SDOH — SOCIAL STABILITY: SOCIAL INSECURITY: DO YOU FEEL ANYONE HAS EXPLOITED OR TAKEN ADVANTAGE OF YOU FINANCIALLY OR OF YOUR PERSONAL PROPERTY?: NO

## 2025-03-16 SDOH — SOCIAL STABILITY: SOCIAL INSECURITY: WERE YOU ABLE TO COMPLETE ALL THE BEHAVIORAL HEALTH SCREENINGS?: YES

## 2025-03-16 SDOH — SOCIAL STABILITY: SOCIAL INSECURITY: ABUSE: ADULT

## 2025-03-16 SDOH — HEALTH STABILITY: PHYSICAL HEALTH: ON AVERAGE, HOW MANY DAYS PER WEEK DO YOU ENGAGE IN MODERATE TO STRENUOUS EXERCISE (LIKE A BRISK WALK)?: 5 DAYS

## 2025-03-16 SDOH — SOCIAL STABILITY: SOCIAL INSECURITY: DOES ANYONE TRY TO KEEP YOU FROM HAVING/CONTACTING OTHER FRIENDS OR DOING THINGS OUTSIDE YOUR HOME?: NO

## 2025-03-16 SDOH — SOCIAL STABILITY: SOCIAL INSECURITY: HAVE YOU HAD THOUGHTS OF HARMING ANYONE ELSE?: NO

## 2025-03-16 SDOH — SOCIAL STABILITY: SOCIAL INSECURITY: ARE YOU OR HAVE YOU BEEN THREATENED OR ABUSED PHYSICALLY, EMOTIONALLY, OR SEXUALLY BY ANYONE?: NO

## 2025-03-16 ASSESSMENT — PAIN - FUNCTIONAL ASSESSMENT: PAIN_FUNCTIONAL_ASSESSMENT: 0-10

## 2025-03-16 ASSESSMENT — LIFESTYLE VARIABLES
AUDIT-C TOTAL SCORE: 2
AUDIT-C TOTAL SCORE: 2
SKIP TO QUESTIONS 9-10: 1
PRESCIPTION_ABUSE_PAST_12_MONTHS: NO
SUBSTANCE_ABUSE_PAST_12_MONTHS: NO
HOW OFTEN DO YOU HAVE A DRINK CONTAINING ALCOHOL: 2-4 TIMES A MONTH
HOW OFTEN DO YOU HAVE 6 OR MORE DRINKS ON ONE OCCASION: NEVER
HAVE YOU EVER FELT YOU SHOULD CUT DOWN ON YOUR DRINKING: NO
TOTAL SCORE: 0
EVER FELT BAD OR GUILTY ABOUT YOUR DRINKING: NO
HAVE PEOPLE ANNOYED YOU BY CRITICIZING YOUR DRINKING: NO
EVER HAD A DRINK FIRST THING IN THE MORNING TO STEADY YOUR NERVES TO GET RID OF A HANGOVER: NO
HOW MANY STANDARD DRINKS CONTAINING ALCOHOL DO YOU HAVE ON A TYPICAL DAY: 1 OR 2

## 2025-03-16 ASSESSMENT — PATIENT HEALTH QUESTIONNAIRE - PHQ9
SUM OF ALL RESPONSES TO PHQ9 QUESTIONS 1 & 2: 0
2. FEELING DOWN, DEPRESSED OR HOPELESS: NOT AT ALL
1. LITTLE INTEREST OR PLEASURE IN DOING THINGS: NOT AT ALL

## 2025-03-16 ASSESSMENT — ACTIVITIES OF DAILY LIVING (ADL)
HEARING - LEFT EAR: DIFFICULTY WITH NOISE
FEEDING YOURSELF: INDEPENDENT
WALKS IN HOME: INDEPENDENT
LACK_OF_TRANSPORTATION: NO
DRESSING YOURSELF: INDEPENDENT
GROOMING: INDEPENDENT
HEARING - RIGHT EAR: DIFFICULTY WITH NOISE
TOILETING: INDEPENDENT
JUDGMENT_ADEQUATE_SAFELY_COMPLETE_DAILY_ACTIVITIES: YES
LACK_OF_TRANSPORTATION: NO
PATIENT'S MEMORY ADEQUATE TO SAFELY COMPLETE DAILY ACTIVITIES?: YES
BATHING: INDEPENDENT
ADEQUATE_TO_COMPLETE_ADL: YES

## 2025-03-16 ASSESSMENT — COLUMBIA-SUICIDE SEVERITY RATING SCALE - C-SSRS
6. HAVE YOU EVER DONE ANYTHING, STARTED TO DO ANYTHING, OR PREPARED TO DO ANYTHING TO END YOUR LIFE?: NO
2. HAVE YOU ACTUALLY HAD ANY THOUGHTS OF KILLING YOURSELF?: NO
1. IN THE PAST MONTH, HAVE YOU WISHED YOU WERE DEAD OR WISHED YOU COULD GO TO SLEEP AND NOT WAKE UP?: NO

## 2025-03-16 ASSESSMENT — COGNITIVE AND FUNCTIONAL STATUS - GENERAL
STANDING UP FROM CHAIR USING ARMS: A LITTLE
WALKING IN HOSPITAL ROOM: A LITTLE
TOILETING: A LITTLE
PATIENT BASELINE BEDBOUND: NO
MOBILITY SCORE: 19
DAILY ACTIVITIY SCORE: 23
MOVING TO AND FROM BED TO CHAIR: A LITTLE
CLIMB 3 TO 5 STEPS WITH RAILING: A LOT

## 2025-03-16 ASSESSMENT — HEART SCORE
ECG: NON-SPECIFIC REPOLARIZATION DISTURBANCE
AGE: 65+
HEART SCORE: 9
TROPONIN: GREATER THAN OR EQUAL TO 3 TIMES NORMAL LIMIT
HISTORY: HIGHLY SUSPICIOUS
RISK FACTORS: >2 RISK FACTORS OR HX OF ATHEROSCLEROTIC DISEASE

## 2025-03-16 ASSESSMENT — PAIN DESCRIPTION - PAIN TYPE: TYPE: ACUTE PAIN

## 2025-03-16 ASSESSMENT — PAIN SCALES - GENERAL: PAINLEVEL_OUTOF10: 2

## 2025-03-16 ASSESSMENT — PAIN DESCRIPTION - LOCATION: LOCATION: CHEST

## 2025-03-16 ASSESSMENT — PAIN DESCRIPTION - DESCRIPTORS: DESCRIPTORS: ACHING

## 2025-03-16 NOTE — H&P
Medical Group History and Physical  ASSESSMENT & PLAN:     Unstable angina  NSTEMI  Atherosclerotic coronary artery disease with 3 stents  - Presented from home with 1 to 2 weeks of ongoing chest pressure that began on the left side and mild diffuse  - History of CAD with 3 stents back in 2010  - Troponin 123 and 129, EKGs reviewed  - Exercise stress test June 2024 reviewed  PLAN:  - Continue to trend troponin and EKGs  - Follow-up lipid panel, hemoglobin A1c  - Resume aspirin, metoprolol, atorvastatin, losartan (for irbesartan), ranolazine  - Heparin drip  - Cardiology consulted  - Echocardiogram ordered    Paroxysmal atrial fibrillation  Long-term anticoagulation use  - Resuming metoprolol as reconciled  - Holding rivaroxaban for heparin drip  - Cardiology consulted as per above    Essential hypertension  Hyperlipidemia  Type II DM  Resting tremors  BPH  - Continue home medications as reconciled  - Insulin correction factor ACHS, holding home metformin    Obstructive sleep apnea  - Continue CPAP at bedtime    Obesity    Spoke with patient's wife at bedside.    VTE Prophylaxis: Heparin infusion      ---Of note, this documentation is completed using the Dragon Dictation system (voice recognition software). There may be spelling and/or grammatical errors that were not corrected prior to final submission.---    Selvin Barton MD    HISTORY OF PRESENT ILLNESS:   Chief Complaint: Chest pain    History Of Present Illness:    Cesar Myles is a 79 y.o. male with a significant past medical history of CAD with 3 stents, type II DM, resting tremors, bilateral lower extremity lymphedema, atrial fibrillation, long-term anticoagulation use with Xarelto that presented from home with chief complaints of ongoing chest pain over the past 1 to 2 weeks.  He states that it was been worsening with any type of exertion started in his left chest that was diffuse.  Describes it as aching/pressure and sensation.  Also endorsed  "having shortness of breath with activity over the past 1 to 2 weeks.  Today he had tightness with rest there for presented to the ED.    ED course:  - Vitals: Temperature 97.3, HR 66, /84, RR 18 satting 94% on room air  - Labs: Troponin 123, 129 otherwise unremarkable CBC and CMP  - Imaging: Chest x-ray reviewed.  - EKGs reviewed as well     Review of systems: 10 point review of systems is otherwise negative except as mentioned above.    PAST HISTORIES:     Past Medical History: CAD with stents, atrial fibrillation, long-term anticoagulation use, type II DM, hypertension, hyperlipidemia, resting tremors, obstructive sleep apnea    Past Surgical History: Left heart catheterization, left total knee arthroplasty      Social History: Previous tobacco smoker, quit 50+ years ago, unknown pack-year history.  Consumes alcohol very rarely.  Denies illicit drug use.  Lives at home with his wife.  Independent with ADLs, uses a cane as needed for long distances.    Family History: Did not provide     Allergies: Patient has no known allergies.    OBJECTIVE:     Last Recorded Vitals:  Vitals:    03/16/25 1115 03/16/25 1315 03/16/25 1336 03/16/25 1400   BP: (!) 184/94 174/89 (!) 168/95    BP Location: Right arm Right arm Right arm    Patient Position: Sitting Lying Lying    Pulse: 66 67 66 60   Resp: 18 18 18    Temp: 36.3 °C (97.3 °F)      TempSrc: Temporal      SpO2: 94% 95% 95% 95%   Weight: 116 kg (255 lb)      Height: 1.778 m (5' 10\")        Last I/O:  No intake/output data recorded.    Physical Exam    Scheduled Medications     PRN Medications     Continuous Medications  heparin, 0-4,000 Units/hr        Outpatient Medications:  Prior to Admission medications    Medication Sig Start Date End Date Taking? Authorizing Provider   ascorbic acid (Vitamin C) 500 mg tablet Take 1 tablet (500 mg) by mouth once daily.    Historical Provider, MD   aspirin 81 mg EC tablet Take 1 tablet (81 mg) by mouth once daily.    Historical " Provider, MD   atorvastatin (Lipitor) 40 mg tablet Take 1 tablet (40 mg) by mouth once daily. 12/6/24 12/6/25  Rafi Soto MD   biotin 10 mg tablet Take 3 tablets (30 mg) by mouth once daily.    Historical Provider, MD   cholecalciferol (Vitamin D-3) 5,000 Units tablet Take 1 tablet (5,000 Units) by mouth once daily.    Historical Provider, MD   finasteride (Proscar) 5 mg tablet Take 0.5 tablets (2.5 mg) by mouth once daily. 5/16/24   Historical Provider, MD   furosemide (Lasix) 20 mg tablet Take 1 tablet (20 mg) by mouth every other day. 12/6/24   Rafi Soto MD   irbesartan (Avapro) 150 mg tablet Take 1 tablet (150 mg) by mouth once daily. 12/6/24 12/6/25  Rafi Soto MD   metFORMIN (Glucophage) 500 mg tablet Take 1 tablet (500 mg) by mouth once daily with breakfast. 11/12/24   Historical Provider, MD   metoprolol succinate XL (Toprol-XL) 50 mg 24 hr tablet Take 1 tablet (50 mg) by mouth once daily. 12/6/24 12/6/25  Rafi Soto MD   multivitamin tablet Take 1 tablet by mouth once daily.    Historical Provider, MD   nortriptyline (Pamelor) 10 mg capsule Take 2 capsules (20 mg) by mouth. 11/11/24 2/9/25  Stephan Provider, MD   omega-3 fatty acids (FISH OIL CONCENTRATE ORAL) Fish Oil OIL take 1 cap daily Quantity: 0 Refills: 0 Ordered: 28-Oct-2021 DO Active    Historical Provider, MD   potassium chloride CR (Klor-Con M10) 10 mEq ER tablet Take 1 tablet (10 mEq) by mouth once daily. 12/6/24 12/6/25  Rafi Soto MD   primidone (Mysoline) 50 mg tablet Take 1 tablet (50 mg) by mouth 3 times a day.    Historical Provider, MD   ranolazine (Ranexa) 500 mg 12 hr tablet Take 1 tablet (500 mg) by mouth 2 times a day. 12/6/24 12/6/25  Rafi Soto MD   rivaroxaban (Xarelto) 10 mg tablet Take 2 tablets (20 mg) by mouth once daily. 12/11/24   Rafi Soto MD   rivaroxaban (Xarelto) 20 mg tablet Take 1 tablet (20 mg) by mouth once daily in the evening. Take with meals. Take  with food. 12/6/24 12/6/25  Rafi Soto MD   terazosin (Hytrin) 5 mg capsule Take 1 capsule (5 mg) by mouth once daily. 12/6/24 12/6/25  Rafi Soto MD       LABS AND IMAGING:     Labs:  Results from last 7 days   Lab Units 03/16/25  1256   WBC AUTO x10*3/uL 5.8   RBC AUTO x10*6/uL 4.58   HEMOGLOBIN g/dL 15.4   HEMATOCRIT % 43.8   MCV fL 96   MCH pg 33.6   MCHC g/dL 35.2   RDW % 12.5   PLATELETS AUTO x10*3/uL 135*     Results from last 7 days   Lab Units 03/16/25  1256   SODIUM mmol/L 136   POTASSIUM mmol/L 4.4   CHLORIDE mmol/L 101   CO2 mmol/L 27   BUN mg/dL 15   CREATININE mg/dL 1.06   GLUCOSE mg/dL 156*   PROTEIN TOTAL g/dL 7.1   CALCIUM mg/dL 9.6   BILIRUBIN TOTAL mg/dL 0.8   ALK PHOS U/L 66   AST U/L 25   ALT U/L 33     Results from last 7 days   Lab Units 03/16/25  1256   MAGNESIUM mg/dL 1.95     Results from last 7 days   Lab Units 03/16/25  1402 03/16/25  1256   TROPHS ng/L 129* 123*       Imaging:  ECG 12 lead  Atrial fibrillation  Septal infarct (cited on or before 16-MAR-2025)  Abnormal ECG  When compared with ECG of 16-MAR-2025 11:13, (unconfirmed)  No significant change was found  See ED provider note for full interpretation and clinical correlation  Confirmed by Lory Oh ([id number]) on 3/16/2025 2:20:04 PM  ECG 12 lead  Atrial fibrillation  Anteroseptal infarct , age undetermined  Abnormal ECG  When compared with ECG of 19-FEB-2010 06:32,  Atrial fibrillation has replaced Sinus rhythm  Anteroseptal infarct is now Present  ST no longer elevated in Lateral leads  Nonspecific T wave abnormality, worse in Anterior leads  See ED provider note for full interpretation and clinical correlation  Confirmed by Lory Oh ([id number]) on 3/16/2025 2:19:55 PM  XR chest 1 view  Narrative: Interpreted By:  Cj Castillo,   STUDY:  XR CHEST 1 VIEW; 3/16/2025 1:19 pm      INDICATION:  Signs/Symptoms:Chest Pain      COMPARISON:  None.      ACCESSION NUMBER(S):  [id number]      ORDERING  CLINICIAN:  KEELY BERRIOS      TECHNIQUE:  Single frontal view of the chest performed.      FINDINGS:  LINES AND DEVICES:  None.      LUNGS:  No focal consolidation, pulmonary edema, pleural effusion or  pneumothorax.      CARDIOMEDIASTINAL SILHOUETTE:  Mild cardiomegaly.      OTHER:  No obvious displaced rib fracture is identified as imaged.      Impression: No acute pulmonary process.      MACRO  None      Signed by: Cj Castillo 3/16/2025 1:40 PM  Dictation workstation:   [id number]

## 2025-03-16 NOTE — Clinical Note
Sheath was exchanged in the right femoral artery with SHEATH, PINCLAUS, W/.038 GUIDEWIRE, 10 CM,  6FR INTRODUCER, 6FR CHAPITO, 2.5 CM DIALATOR.

## 2025-03-16 NOTE — ED PROVIDER NOTES
HPI   Chief Complaint   Patient presents with    Chest Pain     X2 weeks getting worse       79-year-old male presents emergency department, states has been experiencing chest pains for about the last 2 weeks, describes intermittent episodes of achiness in his left chest.  Feels like the episodes are getting worse, they used to be only with significant exertion, but this morning he had an episode where he was just letting the dog out and wiping the dog's pause when suddenly felt tightness across his entire chest, states he got diaphoretic.  States he did take a sublingual nitro that he was prescribed a long time ago, did seem to help the chest pain but also made him lightheaded.    Patient states history of A-fibv On Xarelto, nortriptyline, Lasix.    Does have history of stents, although states his last cath and stent was over 20 years ago.      History provided by:  Patient   used: No            Patient History   No past medical history on file.  Past Surgical History:   Procedure Laterality Date    OTHER SURGICAL HISTORY  10/20/2021    Knee replacement    OTHER SURGICAL HISTORY  10/20/2021    Complete colonoscopy    OTHER SURGICAL HISTORY  10/20/2021    Lumbar laminectomy     No family history on file.  Social History     Tobacco Use    Smoking status: Former     Types: Cigarettes    Smokeless tobacco: Never   Vaping Use    Vaping status: Never Used   Substance Use Topics    Alcohol use: Yes     Alcohol/week: 7.0 standard drinks of alcohol     Types: 7 Standard drinks or equivalent per week    Drug use: Never       Physical Exam   ED Triage Vitals [03/16/25 1115]   Temperature Heart Rate Respirations BP   36.3 °C (97.3 °F) 66 18 (!) 184/94      Pulse Ox Temp Source Heart Rate Source Patient Position   94 % Temporal Monitor Sitting      BP Location FiO2 (%)     Right arm --       Physical Exam  Physical Exam:  Constitutional: Vitals noted, no distress. Afebrile.   Irregular with controlled rate  cardiovascular: Regular, rate, rhythm, no murmur.   Pulmonary: Lungs clear bilaterally with good aeration. No adventitious breath sounds.   Gastrointestinal: Soft, nonsurgical. Nontender. No peritoneal signs. Normoactive bowel sounds.   Musculoskeletal: No peripheral edema. Negative Homans bilaterally, no cords.   Skin: No rash.   Neuro: No focal neurologic deficits, NIH score of 0.      ED Course & MDM   Diagnoses as of 03/16/25 1431   NSTEMI (non-ST elevated myocardial infarction) (Multi)   Atrial fibrillation, controlled (Multi)          Labs Reviewed   CBC WITH AUTO DIFFERENTIAL - Abnormal       Result Value    WBC 5.8      nRBC 0.0      RBC 4.58      Hemoglobin 15.4      Hematocrit 43.8      MCV 96      MCH 33.6      MCHC 35.2      RDW 12.5      Platelets 135 (*)     Neutrophils % 65.3      Immature Granulocytes %, Automated 0.2      Lymphocytes % 22.5      Monocytes % 11.5      Eosinophils % 0.2      Basophils % 0.3      Neutrophils Absolute 3.81      Immature Granulocytes Absolute, Automated 0.01      Lymphocytes Absolute 1.31      Monocytes Absolute 0.67      Eosinophils Absolute 0.01      Basophils Absolute 0.02     COMPREHENSIVE METABOLIC PANEL - Abnormal    Glucose 156 (*)     Sodium 136      Potassium 4.4      Chloride 101      Bicarbonate 27      Anion Gap 12      Urea Nitrogen 15      Creatinine 1.06      eGFR 71      Calcium 9.6      Albumin 4.5      Alkaline Phosphatase 66      Total Protein 7.1      AST 25      Bilirubin, Total 0.8      ALT 33     PROTIME-INR - Abnormal    Protime 12.8 (*)     INR 1.2 (*)    B-TYPE NATRIURETIC PEPTIDE - Abnormal     (*)     Narrative:        <100 pg/mL - Heart failure unlikely  100-299 pg/mL - Intermediate probability of acute heart                  failure exacerbation. Correlate with clinical                  context and patient history.    >=300 pg/mL - Heart Failure likely. Correlate with clinical                  context and patient history.    BNP  testing is performed using different testing methodology at [facility] than at other [facility]. Direct result comparisons should only be made within the same method.      SERIAL TROPONIN-INITIAL - Abnormal    Troponin I, High Sensitivity 123 (*)     Narrative:     Less than 99th percentile of normal range cutoff-  Female and children under 18 years old <14 ng/L; Male <21 ng/L: Negative  Repeat testing should be performed if clinically indicated.     Female and children under 18 years old 14-50 ng/L; Male 21-50 ng/L:  Consistent with possible cardiac damage and possible increased clinical   risk. Serial measurements may help to assess extent of myocardial damage.     >50 ng/L: Consistent with cardiac damage, increased clinical risk and  myocardial infarction. Serial measurements may help assess extent of   myocardial damage.      NOTE: Children less than 1 year old may have higher baseline troponin   levels and results should be interpreted in conjunction with the overall   clinical context.     NOTE: Troponin I testing is performed using a different   testing methodology at [facility] than at other   [facility]. Direct result comparisons should only   be made within the same method.   SERIAL TROPONIN, 1 HOUR - Abnormal    Troponin I, High Sensitivity 129 (*)     Narrative:     Less than 99th percentile of normal range cutoff-  Female and children under 18 years old <14 ng/L; Male <21 ng/L: Negative  Repeat testing should be performed if clinically indicated.     Female and children under 18 years old 14-50 ng/L; Male 21-50 ng/L:  Consistent with possible cardiac damage and possible increased clinical   risk. Serial measurements may help to assess extent of myocardial damage.     >50 ng/L: Consistent with cardiac damage, increased clinical risk and  myocardial infarction. Serial measurements may help assess extent of   myocardial damage.      NOTE: Children less than 1 year  old may have higher baseline troponin   levels and results should be interpreted in conjunction with the overall   clinical context.     NOTE: Troponin I testing is performed using a different   testing methodology at [facility] than at other   [facility] hospitals. Direct result comparisons should only   be made within the same method.   MAGNESIUM - Normal    Magnesium 1.95     TROPONIN SERIES- (INITIAL, 1 HR)    Narrative:     The following orders were created for panel order Troponin I Series, High Sensitivity (0, 1 HR).  Procedure                               Abnormality         Status                     ---------                               -----------         ------                     Troponin I, High Sensiti...[[id number]]  Abnormal            Final result               Troponin, High Sensitivi...[[id number]]  Abnormal            Final result                 Please view results for these tests on the individual orders.        XR chest 1 view   Final Result   No acute pulmonary process.        MACRO   None        Signed by: Cj Castillo 3/16/2025 1:40 PM   Dictation workstation:   [id number]               No data recorded       HEART Score: 9 (03/16/25 1431 : Annmarie Chavez, APRN-CNP)                 Medical Decision Making  Patient presents with chest pain.  Initial EKG at 1113 with ventricular of 63, as interpreted by me, shows atrial fibrillation, unremarkable ST and T wave patterns, no evidence of acute ischemia.    Cardiac workup initiated, CBC with white count of 5.8, hemoglobin 15.4 and platelets of 135.  INR 1.2.  Metabolic panel with glucose of 156, potassium 4.4, nicotine 1.95 and creatinine 1.06.  , initial troponin 123.    Chest x-ray without any evidence of acute cardiopulmonary process.    Repeat EKG at 1406 with ventricular of 63, started IV, shows atrial fibrillation with unremarkable ST and T wave patterns, no evidence of acute ischemia other acute findings.    Patient  does have elevated troponin, his description of his symptoms, chest pain is concerning for ACS.  He was given 324 of aspirin.  Took a similar alto last night before bed.  I did discuss with hospitalist Dr. Barton who would like to start him on heparin drip without a bolus.    Procedure  Procedures     Annmarie Chavez, APRN-CNP  03/16/25 3563

## 2025-03-16 NOTE — Clinical Note
Angioplasty of the proximal left anterior descending lesion. Inflation 1: Pressure = 8 dominic; Duration = 22 sec.

## 2025-03-16 NOTE — Clinical Note
Vessel: LAD (mid). Inflation 1: Pressure = 10 dominic; Duration = 30 sec. Inflation 2: Pressure = 14 dominic; Duration = 30 sec.

## 2025-03-16 NOTE — NURSING NOTE
At 1735, this nurse informed Doctor Mick of patient blood pressure of 193/ 97 hr 75. Labetalol 10 mg was given. Will continue with plan of care,

## 2025-03-16 NOTE — Clinical Note
Angioplasty of the middle left anterior descending lesion. Inflation 1: Pressure = 6 dominic; Duration = 25 sec.

## 2025-03-17 ENCOUNTER — APPOINTMENT (OUTPATIENT)
Dept: CARDIOLOGY | Facility: HOSPITAL | Age: 80
End: 2025-03-17
Payer: MEDICARE

## 2025-03-17 LAB
ACT BLD: 231 SEC (ref 83–199)
ACT BLD: >397 SEC (ref 83–199)
ANION GAP SERPL CALC-SCNC: 11 MMOL/L (ref 10–20)
ATRIAL RATE: 108 BPM
ATRIAL RATE: 75 BPM
BASOPHILS # BLD AUTO: 0.03 X10*3/UL (ref 0–0.1)
BASOPHILS NFR BLD AUTO: 0.5 %
BUN SERPL-MCNC: 13 MG/DL (ref 6–23)
CALCIUM SERPL-MCNC: 9.1 MG/DL (ref 8.6–10.3)
CARDIAC TROPONIN I PNL SERPL HS: 272 NG/L (ref 0–20)
CHLORIDE SERPL-SCNC: 101 MMOL/L (ref 98–107)
CHOLEST SERPL-MCNC: 131 MG/DL (ref 0–199)
CHOLESTEROL/HDL RATIO: 3.6
CO2 SERPL-SCNC: 30 MMOL/L (ref 21–32)
CREAT SERPL-MCNC: 1.05 MG/DL (ref 0.5–1.3)
EGFRCR SERPLBLD CKD-EPI 2021: 71 ML/MIN/1.73M*2
EOSINOPHIL # BLD AUTO: 0.06 X10*3/UL (ref 0–0.4)
EOSINOPHIL NFR BLD AUTO: 1 %
ERYTHROCYTE [DISTWIDTH] IN BLOOD BY AUTOMATED COUNT: 12.9 % (ref 11.5–14.5)
EST. AVERAGE GLUCOSE BLD GHB EST-MCNC: 151 MG/DL
GLUCOSE BLD MANUAL STRIP-MCNC: 141 MG/DL (ref 74–99)
GLUCOSE BLD MANUAL STRIP-MCNC: 149 MG/DL (ref 74–99)
GLUCOSE BLD MANUAL STRIP-MCNC: 197 MG/DL (ref 74–99)
GLUCOSE SERPL-MCNC: 138 MG/DL (ref 74–99)
HBA1C MFR BLD: 6.9 %
HCT VFR BLD AUTO: 44.2 % (ref 41–52)
HDLC SERPL-MCNC: 36.4 MG/DL
HGB BLD-MCNC: 15.2 G/DL (ref 13.5–17.5)
HOLD SPECIMEN: NORMAL
IMM GRANULOCYTES # BLD AUTO: 0.02 X10*3/UL (ref 0–0.5)
IMM GRANULOCYTES NFR BLD AUTO: 0.3 % (ref 0–0.9)
LDLC SERPL CALC-MCNC: 71 MG/DL
LYMPHOCYTES # BLD AUTO: 2.25 X10*3/UL (ref 0.8–3)
LYMPHOCYTES NFR BLD AUTO: 36.9 %
MAGNESIUM SERPL-MCNC: 1.98 MG/DL (ref 1.6–2.4)
MCH RBC QN AUTO: 33.1 PG (ref 26–34)
MCHC RBC AUTO-ENTMCNC: 34.4 G/DL (ref 32–36)
MCV RBC AUTO: 96 FL (ref 80–100)
MONOCYTES # BLD AUTO: 0.81 X10*3/UL (ref 0.05–0.8)
MONOCYTES NFR BLD AUTO: 13.3 %
NEUTROPHILS # BLD AUTO: 2.92 X10*3/UL (ref 1.6–5.5)
NEUTROPHILS NFR BLD AUTO: 48 %
NON HDL CHOLESTEROL: 95 MG/DL (ref 0–149)
NRBC BLD-RTO: 0 /100 WBCS (ref 0–0)
PLATELET # BLD AUTO: 128 X10*3/UL (ref 150–450)
POTASSIUM SERPL-SCNC: 4.2 MMOL/L (ref 3.5–5.3)
Q ONSET: 221 MS
Q ONSET: 222 MS
QRS COUNT: 11 BEATS
QRS COUNT: 16 BEATS
QRS DURATION: 102 MS
QRS DURATION: 92 MS
QT INTERVAL: 388 MS
QT INTERVAL: 446 MS
QTC CALCULATION(BAZETT): 492 MS
QTC CALCULATION(BAZETT): 495 MS
QTC FREDERICIA: 455 MS
QTC FREDERICIA: 478 MS
R AXIS: 11 DEGREES
R AXIS: 50 DEGREES
RBC # BLD AUTO: 4.59 X10*6/UL (ref 4.5–5.9)
SODIUM SERPL-SCNC: 138 MMOL/L (ref 136–145)
T AXIS: -43 DEGREES
T AXIS: -73 DEGREES
T OFFSET: 416 MS
T OFFSET: 444 MS
TRIGL SERPL-MCNC: 117 MG/DL (ref 0–149)
UFH PPP CHRO-ACNC: 0.3 IU/ML
UFH PPP CHRO-ACNC: 0.4 IU/ML
UFH PPP CHRO-ACNC: <0.1 IU/ML
VENTRICULAR RATE: 74 BPM
VENTRICULAR RATE: 97 BPM
VLDL: 23 MG/DL (ref 0–40)
WBC # BLD AUTO: 6.1 X10*3/UL (ref 4.4–11.3)

## 2025-03-17 PROCEDURE — 93005 ELECTROCARDIOGRAM TRACING: CPT

## 2025-03-17 PROCEDURE — C1760 CLOSURE DEV, VASC: HCPCS | Performed by: INTERNAL MEDICINE

## 2025-03-17 PROCEDURE — C9600 PERC DRUG-EL COR STENT SING: HCPCS | Mod: LD | Performed by: INTERNAL MEDICINE

## 2025-03-17 PROCEDURE — 99153 MOD SED SAME PHYS/QHP EA: CPT | Performed by: INTERNAL MEDICINE

## 2025-03-17 PROCEDURE — 99152 MOD SED SAME PHYS/QHP 5/>YRS: CPT | Performed by: INTERNAL MEDICINE

## 2025-03-17 PROCEDURE — C1887 CATHETER, GUIDING: HCPCS | Performed by: INTERNAL MEDICINE

## 2025-03-17 PROCEDURE — 80061 LIPID PANEL: CPT | Performed by: STUDENT IN AN ORGANIZED HEALTH CARE EDUCATION/TRAINING PROGRAM

## 2025-03-17 PROCEDURE — 2500000002 HC RX 250 W HCPCS SELF ADMINISTERED DRUGS (ALT 637 FOR MEDICARE OP, ALT 636 FOR OP/ED): Performed by: STUDENT IN AN ORGANIZED HEALTH CARE EDUCATION/TRAINING PROGRAM

## 2025-03-17 PROCEDURE — 2500000001 HC RX 250 WO HCPCS SELF ADMINISTERED DRUGS (ALT 637 FOR MEDICARE OP): Performed by: NURSE PRACTITIONER

## 2025-03-17 PROCEDURE — 027034Z DILATION OF CORONARY ARTERY, ONE ARTERY WITH DRUG-ELUTING INTRALUMINAL DEVICE, PERCUTANEOUS APPROACH: ICD-10-PCS | Performed by: INTERNAL MEDICINE

## 2025-03-17 PROCEDURE — C1874 STENT, COATED/COV W/DEL SYS: HCPCS | Performed by: INTERNAL MEDICINE

## 2025-03-17 PROCEDURE — 99233 SBSQ HOSP IP/OBS HIGH 50: CPT | Performed by: STUDENT IN AN ORGANIZED HEALTH CARE EDUCATION/TRAINING PROGRAM

## 2025-03-17 PROCEDURE — 2780000003 HC OR 278 NO HCPCS: Performed by: INTERNAL MEDICINE

## 2025-03-17 PROCEDURE — 85347 COAGULATION TIME ACTIVATED: CPT | Performed by: INTERNAL MEDICINE

## 2025-03-17 PROCEDURE — 85025 COMPLETE CBC W/AUTO DIFF WBC: CPT | Performed by: STUDENT IN AN ORGANIZED HEALTH CARE EDUCATION/TRAINING PROGRAM

## 2025-03-17 PROCEDURE — 99024 POSTOP FOLLOW-UP VISIT: CPT | Performed by: NURSE PRACTITIONER

## 2025-03-17 PROCEDURE — 93458 L HRT ARTERY/VENTRICLE ANGIO: CPT | Performed by: INTERNAL MEDICINE

## 2025-03-17 PROCEDURE — 36415 COLL VENOUS BLD VENIPUNCTURE: CPT | Performed by: STUDENT IN AN ORGANIZED HEALTH CARE EDUCATION/TRAINING PROGRAM

## 2025-03-17 PROCEDURE — G0269 OCCLUSIVE DEVICE IN VEIN ART: HCPCS | Mod: 59 | Performed by: INTERNAL MEDICINE

## 2025-03-17 PROCEDURE — 2500000001 HC RX 250 WO HCPCS SELF ADMINISTERED DRUGS (ALT 637 FOR MEDICARE OP): Performed by: INTERNAL MEDICINE

## 2025-03-17 PROCEDURE — 2060000001 HC INTERMEDIATE ICU ROOM DAILY

## 2025-03-17 PROCEDURE — 93010 ELECTROCARDIOGRAM REPORT: CPT | Performed by: INTERNAL MEDICINE

## 2025-03-17 PROCEDURE — C1725 CATH, TRANSLUMIN NON-LASER: HCPCS | Performed by: INTERNAL MEDICINE

## 2025-03-17 PROCEDURE — C1769 GUIDE WIRE: HCPCS | Performed by: INTERNAL MEDICINE

## 2025-03-17 PROCEDURE — 7100000009 HC PHASE TWO TIME - INITIAL BASE CHARGE: Performed by: INTERNAL MEDICINE

## 2025-03-17 PROCEDURE — 92928 PRQ TCAT PLMT NTRAC ST 1 LES: CPT | Performed by: INTERNAL MEDICINE

## 2025-03-17 PROCEDURE — 2500000004 HC RX 250 GENERAL PHARMACY W/ HCPCS (ALT 636 FOR OP/ED): Performed by: INTERNAL MEDICINE

## 2025-03-17 PROCEDURE — 2500000004 HC RX 250 GENERAL PHARMACY W/ HCPCS (ALT 636 FOR OP/ED): Performed by: STUDENT IN AN ORGANIZED HEALTH CARE EDUCATION/TRAINING PROGRAM

## 2025-03-17 PROCEDURE — 82947 ASSAY GLUCOSE BLOOD QUANT: CPT

## 2025-03-17 PROCEDURE — 99223 1ST HOSP IP/OBS HIGH 75: CPT | Performed by: INTERNAL MEDICINE

## 2025-03-17 PROCEDURE — 83735 ASSAY OF MAGNESIUM: CPT | Performed by: STUDENT IN AN ORGANIZED HEALTH CARE EDUCATION/TRAINING PROGRAM

## 2025-03-17 PROCEDURE — 7100000010 HC PHASE TWO TIME - EACH INCREMENTAL 1 MINUTE: Performed by: INTERNAL MEDICINE

## 2025-03-17 PROCEDURE — 80048 BASIC METABOLIC PNL TOTAL CA: CPT | Performed by: STUDENT IN AN ORGANIZED HEALTH CARE EDUCATION/TRAINING PROGRAM

## 2025-03-17 PROCEDURE — 85520 HEPARIN ASSAY: CPT | Performed by: STUDENT IN AN ORGANIZED HEALTH CARE EDUCATION/TRAINING PROGRAM

## 2025-03-17 PROCEDURE — 2500000004 HC RX 250 GENERAL PHARMACY W/ HCPCS (ALT 636 FOR OP/ED): Performed by: NURSE PRACTITIONER

## 2025-03-17 PROCEDURE — 85347 COAGULATION TIME ACTIVATED: CPT

## 2025-03-17 PROCEDURE — 93458 L HRT ARTERY/VENTRICLE ANGIO: CPT | Mod: 59 | Performed by: INTERNAL MEDICINE

## 2025-03-17 PROCEDURE — 4A023N7 MEASUREMENT OF CARDIAC SAMPLING AND PRESSURE, LEFT HEART, PERCUTANEOUS APPROACH: ICD-10-PCS | Performed by: INTERNAL MEDICINE

## 2025-03-17 PROCEDURE — 2500000001 HC RX 250 WO HCPCS SELF ADMINISTERED DRUGS (ALT 637 FOR MEDICARE OP): Performed by: STUDENT IN AN ORGANIZED HEALTH CARE EDUCATION/TRAINING PROGRAM

## 2025-03-17 PROCEDURE — B2111ZZ FLUOROSCOPY OF MULTIPLE CORONARY ARTERIES USING LOW OSMOLAR CONTRAST: ICD-10-PCS | Performed by: INTERNAL MEDICINE

## 2025-03-17 PROCEDURE — 84484 ASSAY OF TROPONIN QUANT: CPT | Performed by: STUDENT IN AN ORGANIZED HEALTH CARE EDUCATION/TRAINING PROGRAM

## 2025-03-17 PROCEDURE — 7100000002 HC RECOVERY ROOM TIME - EACH INCREMENTAL 1 MINUTE: Performed by: INTERNAL MEDICINE

## 2025-03-17 PROCEDURE — 2550000001 HC RX 255 CONTRASTS: Performed by: INTERNAL MEDICINE

## 2025-03-17 PROCEDURE — B2151ZZ FLUOROSCOPY OF LEFT HEART USING LOW OSMOLAR CONTRAST: ICD-10-PCS | Performed by: INTERNAL MEDICINE

## 2025-03-17 PROCEDURE — 7100000001 HC RECOVERY ROOM TIME - INITIAL BASE CHARGE: Performed by: INTERNAL MEDICINE

## 2025-03-17 PROCEDURE — 2720000007 HC OR 272 NO HCPCS: Performed by: INTERNAL MEDICINE

## 2025-03-17 DEVICE — STENT ONYXNG30018UX ONYX 3.00X18RX
Type: IMPLANTABLE DEVICE | Site: CORONARY | Status: FUNCTIONAL
Brand: ONYX FRONTIER™

## 2025-03-17 RX ORDER — ISOSORBIDE MONONITRATE 60 MG/1
60 TABLET, EXTENDED RELEASE ORAL DAILY
Status: DISCONTINUED | OUTPATIENT
Start: 2025-03-17 | End: 2025-03-18 | Stop reason: HOSPADM

## 2025-03-17 RX ORDER — FENTANYL CITRATE 50 UG/ML
INJECTION, SOLUTION INTRAMUSCULAR; INTRAVENOUS AS NEEDED
Status: DISCONTINUED | OUTPATIENT
Start: 2025-03-17 | End: 2025-03-17 | Stop reason: HOSPADM

## 2025-03-17 RX ORDER — LIDOCAINE HYDROCHLORIDE 20 MG/ML
INJECTION, SOLUTION INFILTRATION; PERINEURAL AS NEEDED
Status: DISCONTINUED | OUTPATIENT
Start: 2025-03-17 | End: 2025-03-17 | Stop reason: HOSPADM

## 2025-03-17 RX ORDER — CLOPIDOGREL BISULFATE 75 MG/1
75 TABLET ORAL DAILY
Qty: 30 TABLET | Refills: 11 | Status: SHIPPED | OUTPATIENT
Start: 2025-03-18 | End: 2025-03-27 | Stop reason: SDUPTHER

## 2025-03-17 RX ORDER — NITROGLYCERIN 40 MG/100ML
INJECTION INTRAVENOUS AS NEEDED
Status: DISCONTINUED | OUTPATIENT
Start: 2025-03-17 | End: 2025-03-17 | Stop reason: HOSPADM

## 2025-03-17 RX ORDER — LOSARTAN POTASSIUM 50 MG/1
50 TABLET ORAL ONCE
Status: DISCONTINUED | OUTPATIENT
Start: 2025-03-17 | End: 2025-03-18 | Stop reason: HOSPADM

## 2025-03-17 RX ORDER — LOSARTAN POTASSIUM 100 MG/1
100 TABLET ORAL DAILY
Status: DISCONTINUED | OUTPATIENT
Start: 2025-03-18 | End: 2025-03-18 | Stop reason: HOSPADM

## 2025-03-17 RX ORDER — NITROGLYCERIN 0.4 MG/1
0.4 TABLET SUBLINGUAL EVERY 5 MIN PRN
Qty: 25 TABLET | Refills: 3 | Status: SHIPPED | OUTPATIENT
Start: 2025-03-17

## 2025-03-17 RX ORDER — ALUMINUM HYDROXIDE, MAGNESIUM HYDROXIDE, AND SIMETHICONE 1200; 120; 1200 MG/30ML; MG/30ML; MG/30ML
30 SUSPENSION ORAL 4 TIMES DAILY PRN
Status: DISCONTINUED | OUTPATIENT
Start: 2025-03-17 | End: 2025-03-18 | Stop reason: HOSPADM

## 2025-03-17 RX ORDER — ASPIRIN 81 MG/1
81 TABLET ORAL DAILY
Start: 2025-03-17 | End: 2025-03-28 | Stop reason: WASHOUT

## 2025-03-17 RX ORDER — CLOPIDOGREL BISULFATE 300 MG/1
TABLET, FILM COATED ORAL AS NEEDED
Status: DISCONTINUED | OUTPATIENT
Start: 2025-03-17 | End: 2025-03-17 | Stop reason: HOSPADM

## 2025-03-17 RX ORDER — HEPARIN SODIUM 1000 [USP'U]/ML
INJECTION, SOLUTION INTRAVENOUS; SUBCUTANEOUS AS NEEDED
Status: DISCONTINUED | OUTPATIENT
Start: 2025-03-17 | End: 2025-03-17 | Stop reason: HOSPADM

## 2025-03-17 RX ORDER — SODIUM CHLORIDE 9 MG/ML
100 INJECTION, SOLUTION INTRAVENOUS CONTINUOUS
Status: CANCELLED | OUTPATIENT
Start: 2025-03-17 | End: 2025-03-18

## 2025-03-17 RX ORDER — HEPARIN SODIUM 10000 [USP'U]/100ML
0-4000 INJECTION, SOLUTION INTRAVENOUS CONTINUOUS
Status: DISCONTINUED | OUTPATIENT
Start: 2025-03-17 | End: 2025-03-18

## 2025-03-17 RX ORDER — CLOPIDOGREL BISULFATE 75 MG/1
75 TABLET ORAL DAILY
Status: DISCONTINUED | OUTPATIENT
Start: 2025-03-18 | End: 2025-03-18 | Stop reason: HOSPADM

## 2025-03-17 RX ORDER — MIDAZOLAM HYDROCHLORIDE 1 MG/ML
INJECTION INTRAMUSCULAR; INTRAVENOUS AS NEEDED
Status: DISCONTINUED | OUTPATIENT
Start: 2025-03-17 | End: 2025-03-17 | Stop reason: HOSPADM

## 2025-03-17 RX ADMIN — NORTRIPTYLINE HYDROCHLORIDE 20 MG: 10 CAPSULE ORAL at 22:40

## 2025-03-17 RX ADMIN — FINASTERIDE 2.5 MG: 5 TABLET, FILM COATED ORAL at 20:29

## 2025-03-17 RX ADMIN — RANOLAZINE 500 MG: 500 TABLET, FILM COATED, EXTENDED RELEASE ORAL at 08:32

## 2025-03-17 RX ADMIN — LOSARTAN POTASSIUM 50 MG: 50 TABLET, FILM COATED ORAL at 08:32

## 2025-03-17 RX ADMIN — RANOLAZINE 500 MG: 500 TABLET, FILM COATED, EXTENDED RELEASE ORAL at 20:29

## 2025-03-17 RX ADMIN — PRIMIDONE 50 MG: 50 TABLET ORAL at 18:40

## 2025-03-17 RX ADMIN — TERAZOSIN HYDROCHLORIDE 5 MG: 5 CAPSULE ORAL at 08:45

## 2025-03-17 RX ADMIN — ISOSORBIDE MONONITRATE 60 MG: 60 TABLET, EXTENDED RELEASE ORAL at 08:31

## 2025-03-17 RX ADMIN — HEPARIN SODIUM 1000 UNITS/HR: 10000 INJECTION, SOLUTION INTRAVENOUS at 19:03

## 2025-03-17 RX ADMIN — HYDRALAZINE HYDROCHLORIDE 5 MG: 20 INJECTION INTRAMUSCULAR; INTRAVENOUS at 04:51

## 2025-03-17 RX ADMIN — ATORVASTATIN CALCIUM 40 MG: 20 TABLET, FILM COATED ORAL at 20:30

## 2025-03-17 RX ADMIN — ASPIRIN 81 MG: 81 TABLET, COATED ORAL at 08:31

## 2025-03-17 RX ADMIN — PRIMIDONE 50 MG: 50 TABLET ORAL at 08:31

## 2025-03-17 RX ADMIN — INSULIN LISPRO 2 UNITS: 100 INJECTION, SOLUTION INTRAVENOUS; SUBCUTANEOUS at 16:37

## 2025-03-17 RX ADMIN — PRIMIDONE 50 MG: 50 TABLET ORAL at 20:30

## 2025-03-17 RX ADMIN — ACETAMINOPHEN 650 MG: 325 TABLET ORAL at 13:36

## 2025-03-17 RX ADMIN — METOPROLOL SUCCINATE 50 MG: 50 TABLET, EXTENDED RELEASE ORAL at 20:29

## 2025-03-17 ASSESSMENT — COGNITIVE AND FUNCTIONAL STATUS - GENERAL
STANDING UP FROM CHAIR USING ARMS: A LITTLE
MOBILITY SCORE: 19
STANDING UP FROM CHAIR USING ARMS: A LITTLE
DAILY ACTIVITIY SCORE: 24
MOVING TO AND FROM BED TO CHAIR: A LITTLE
MOVING TO AND FROM BED TO CHAIR: A LITTLE
MOBILITY SCORE: 19
CLIMB 3 TO 5 STEPS WITH RAILING: A LOT
WALKING IN HOSPITAL ROOM: A LITTLE
DAILY ACTIVITIY SCORE: 24
WALKING IN HOSPITAL ROOM: A LITTLE
CLIMB 3 TO 5 STEPS WITH RAILING: A LOT

## 2025-03-17 ASSESSMENT — PAIN SCALES - GENERAL
PAINLEVEL_OUTOF10: 0 - NO PAIN
PAINLEVEL_OUTOF10: 2
PAINLEVEL_OUTOF10: 0 - NO PAIN
PAINLEVEL_OUTOF10: 2

## 2025-03-17 ASSESSMENT — PAIN - FUNCTIONAL ASSESSMENT
PAIN_FUNCTIONAL_ASSESSMENT: 0-10

## 2025-03-17 ASSESSMENT — PAIN DESCRIPTION - LOCATION: LOCATION: HEAD

## 2025-03-17 NOTE — PROGRESS NOTES
Patient is stable status post LHC/PCI under the care of Dr. Membreno.  Discussed results of procedure with patient and his wife.  Pictures provided.  Findings of the LHC revealed patent previous stent in the LAD, 99% stenosis in the mid LAD and mild disease elsewhere.  Patient underwent successful PCI with 1 MARTHA placed to the mid LAD reducing that lesion down to 0% stenosis.  The patient tolerated the procedure well.  Please see procedural report for complete details.  Patient was initiated on Plavix 75 mg daily and was instructed to take aspirin 81 mg x 1 week only, then stop.  Xarelto can be resumed on 3/18/2025.  Echocardiogram pending at this time.  Continue to monitor on telemetry.  Possible discharge on 3/18/2025 barring no complications.  Outpatient follow-up with Dr. Soto/cardiology service has been arranged.  Postprocedural activity, restrictions, potential complications, medications and future follow-up discussed at length.  All questions answered.  Both verbalized and understanding.

## 2025-03-17 NOTE — PROGRESS NOTES
"Cesar Myles is a 79 y.o. male on day 1 of admission presenting with NSTEMI (non-ST elevated myocardial infarction) (Multi).    Subjective   79-year old male with a history of hypertension, hyperlipidemia, type 2 diabetes mellitus, resting tremors, BPH, AMAYA, obesity, paroxysmal atrial fibrillation on Xarelto.   Presented on 3/16 for for chest pain and was found to have NSTEMI and was started on a heparin drip. Today, he was consulted by Cardiology with plans for left heart catherization plus or minus PCI. Troponin remains elevated at 272. Heparin assay 0.4. Lipid panel completed and WNL. Echo pending.     Currently reporting chest pain that \"comes and goes\".  Denies SOB. Requesting to be out of bed more and move some.  Objective     Physical Exam  HENT:      Head: Normocephalic and atraumatic.   Cardiovascular:      Rate and Rhythm: Normal rate and regular rhythm.      Pulses: Normal pulses.   Pulmonary:      Effort: No respiratory distress.      Breath sounds: Normal breath sounds.   Abdominal:      Palpations: Abdomen is soft.      Tenderness: There is no abdominal tenderness.   Musculoskeletal:         General: No signs of injury.   Neurological:      Mental Status: He is alert and oriented to person, place, and time.         Last Recorded Vitals  Blood pressure 150/82, pulse 75, temperature 36.6 °C (97.9 °F), resp. rate 20, height 1.778 m (5' 10\"), weight 116 kg (255 lb), SpO2 93%.  Intake/Output last 3 Shifts:  I/O last 3 completed shifts:  In: 134.6 (1.2 mL/kg) [I.V.:134.6 (1.2 mL/kg)]  Out: 300 (2.6 mL/kg) [Urine:300 (0.1 mL/kg/hr)]  Weight: 115.7 kg     Relevant Results                              Assessment/Plan   Assessment & Plan  NSTEMI (non-ST elevated myocardial infarction) (Multi)    Patient is expected to undergo left heart catheterization plus or minus PCI today. Appreciate Cardiology recommendations:  Tele monitoring  Serial enzymes  2d echo  Daily EKG's  Continue the heparin drip  Aspirin 81 " mg daily  Lipitor 40 mg daily  Cozaar 50 mg daily  Toprol-XL 50 mg daily  Primidone 50 mg p.o. 3 times daily  Xarelto on hold since 3/15/2025  Ranexa 500 mg p.o. twice daily  Add Imdur 60 mg daily       I spent 45 minutes in the professional and overall care of this patient.      Kimberlee Palomares PA-C

## 2025-03-17 NOTE — POST-PROCEDURE NOTE
Physician Transition of Care Summary  Invasive Cardiovascular Lab    Procedure Date: 3/17/2025  Attending:    * Joaquim Membreno - Primary  Resident/Fellow/Other Assistant: Surgeons and Role:  * No surgeons found with a matching role *    Pre Procedure Diagnosis:   CAD, remote PCI, NSTEMI, persistent atrial fibrillation    Post Procedure Diagnosis:   CAD, patent previous stent of LAD, 99% mid LAD lesion, successful MARTHA of mid LAD    Complications:   None    Stents/Implants:   MARTHA of mid LAD    Anticoagulation/Antiplatelet Plan:   Aspirin 81 mg a day for 1 week, Plavix 75 mg a day, resume Xarelto tomorrow morning    Estimated Blood Loss:   0 mL    Electronically signed by: Joaquim Membreno MD, 3/17/2025 12:50 PM    Anesthesia: Moderate                            anesthesia Staff: None

## 2025-03-17 NOTE — NURSING NOTE
Yellow folder given to patient and wife included plavix information sheet,  teaching with scenario, groin interventions if bleeding occurs and when and what to call the nurse tonight if needed.

## 2025-03-17 NOTE — CARE PLAN
The patient's goals for the shift include decrease SOB    The clinical goals for the shift include increase the strength in my legs

## 2025-03-17 NOTE — NURSING NOTE
Patient recovered from cath lab with right groin vascade site stable, wife at the bedside, VSS, +2 pedal pulse.  Patient instructed on activity restrictions and groin bleeding interventions.

## 2025-03-17 NOTE — CONSULTS
Cardiology Consult Note      Date:   3/17/2025  Patient name:  Cesar Myles  Date of admission:  3/16/2025 12:33 PM  MRN:   [id number]  YOB: 1945  Time of Consult:  8:11 AM    Consulting Cardiologist: Dr. Rafi Whitney, APRN, CNP  Primary Cardiologist:  Dr. Rafi Soto    Referring Provider: Dr Barton      Admission Diagnosis:     NSTEMI (non-ST elevated myocardial infarction) (Multi)      History of Present Illness:      Cesar Myles is a 79 y.o.  male patient who is being at the request of Dr. Barton for inpatient consultation of angina. He was admitted on 3/16/2025.  Previous [facility] and [facility] records have been reviewed in detail.    Patient with a history of CAD, persistent A-fib, diabetes, hypertension, dyslipidemia, sleep apnea, lymphedema  Patient states the last couple weeks she has been having episodes of chest pain on and off at different times.  He states that he felt like the chest pain was more when he was doing something exerting himself when he would rest the chest pain would go away.  He states though on Sunday the chest pain came on he had some nitro he took a nitro his pain was a 6 it steadily started to go down but he was more concerned because he was just sitting down when this chest pain came on.  He states that he normally takes Xarelto his last dose of Xarelto they took was on Saturday.  He did go on to tell me that he does have a history of lymphedema and he does take diuretics but is also had some shortness of breath and fatigue.  He is currently on the 10th floor on a heparin drip currently chest pain-free at this time  Positive for chest pain, shortness of breath, fatigue  Denies fever, chills, nausea, vomiting, PND, orthopnea, claudication    EKG has new ST depression V2-V6 and inferior  Magnesium 1.98  Sodium 138  Potassium 4.2  Chloride 101  BUN 13  Creatinine 1.05  Troponin 123, 129, 192, 272    Cardiac history  6/6/2024 nuclear stress  test  Normal exercise Myoview cardiac perfusion stress test.  No evidence of ischemia or myocardial infarction by perfusion imaging.  Normal left ventricular systolic function, ejection fraction 68%.  No exercise provoked significant ischemic ECG changes or chest pain  symptoms. Patient had low functional capacity limited primarily by  leg pain. Chronotropic response was markedly excessive heart rate  increasing from baseline 60 to 150 beats per minute within 2 minutes  of exercise. Suggests suboptimal rate control of AFib. Blood pressure  response with significant elevation consistent with deconditioning.  This did result in an excellent heart rate blood pressure product  there is no ischemia preserved systolic function likelihood critical  coronary disease would be considered low.    7/17/2020 echo  CONCLUSIONS:   1. The left ventricular systolic function is normal.   2. Underlying atrial fibrillation with well controlled VR is noted.   3. Mild impairment of RV systolic function.   4. Moderately enlarged right ventricle.   5. Severe RA enlargement.   6. The right atrium is severely dilated.   7. Trivial to 1+ mitral regurgitation.   8. Mild (1+) tricuspid regurgitation with estimated RVSP of 32 mm Hg.   9. Trivial to 1+ pulmonic regurgitation.  10. This study was compared with an echo performed on 12/29/2015. That study showed normal LV systolic function with 2+ MR, 1-2+ TR, and RVSP of 42 mm Hg.       Allergies:     No Known Allergies      Past Medical History:     Past Medical History:   Diagnosis Date    Arthritis     COPD (chronic obstructive pulmonary disease) (Multi)     Coronary artery disease     Diabetes mellitus (Multi)     Hypertension        Past Surgical History:     Past Surgical History:   Procedure Laterality Date    OTHER SURGICAL HISTORY  10/20/2021    Knee replacement    OTHER SURGICAL HISTORY  10/20/2021    Complete colonoscopy    OTHER SURGICAL HISTORY  10/20/2021    Lumbar laminectomy        Family History:     No family history on file.    Social History:     Social History     Tobacco Use    Smoking status: Former     Types: Cigarettes    Smokeless tobacco: Never   Vaping Use    Vaping status: Never Used   Substance Use Topics    Alcohol use: Yes     Alcohol/week: 7.0 standard drinks of alcohol     Types: 7 Standard drinks or equivalent per week    Drug use: Never       CURRENT INPATIENT MEDICATIONS    aspirin, 81 mg, oral, Daily  atorvastatin, 40 mg, oral, Nightly  finasteride, 2.5 mg, oral, Daily  [Held by provider] furosemide, 20 mg, oral, Every other day  insulin lispro, 0-10 Units, subcutaneous, Before meals & nightly  isosorbide mononitrate ER, 60 mg, oral, Daily  losartan, 50 mg, oral, Daily  metoprolol succinate XL, 50 mg, oral, Daily  nortriptyline, 20 mg, oral, Nightly  primidone, 50 mg, oral, TID  ranolazine, 500 mg, oral, BID  [Held by provider] rivaroxaban, 20 mg, oral, Daily  terazosin, 5 mg, oral, Daily      heparin, 0-4,000 Units/hr, Last Rate: 1,000 Units/hr (03/17/25 0545)      Current Outpatient Medications   Medication Instructions    ascorbic acid (VITAMIN C) 500 mg, Daily    aspirin 81 mg EC tablet 1 tablet, Daily    atorvastatin (LIPITOR) 40 mg, oral, Daily    biotin 10 mg tablet 3 tablets, Daily    cholecalciferol (Vitamin D-3) 5,000 Units tablet 1 tablet, Daily    finasteride (Proscar) 5 mg tablet 0.5 tablets, Daily    furosemide (LASIX) 20 mg, oral, Every other day    irbesartan (AVAPRO) 150 mg, oral, Daily    metFORMIN (GLUCOPHAGE) 500 mg, Daily with breakfast    metoprolol succinate XL (TOPROL-XL) 50 mg, oral, Daily    multivitamin tablet 1 tablet, Daily    nortriptyline (PAMELOR) 20 mg    omega-3 fatty acids (FISH OIL CONCENTRATE ORAL) Fish Oil OIL take 1 cap daily Quantity: 0 Refills: 0 Ordered: 28-Oct-2021 DO Active    potassium chloride CR (Klor-Con M10) 10 mEq ER tablet 10 mEq, oral, Daily    primidone (Mysoline) 50 mg tablet 1 tablet, 3 times daily    ranolazine  (RANEXA) 500 mg, oral, 2 times daily    rivaroxaban (XARELTO) 20 mg, oral, Daily with evening meal, Take with food.    rivaroxaban (XARELTO) 20 mg, oral, Daily    terazosin (HYTRIN) 5 mg, oral, Daily        Review of Systems:      12 point review of systems was obtained in detail and is negative other than that detailed above.      Vital Signs:     Vitals:    03/17/25 0401 03/17/25 0445 03/17/25 0543 03/17/25 0748   BP: (!) 174/103 (!) 181/102 (!) 190/95 150/82   Patient Position: Sitting      Pulse: 63 67 78 75   Resp: 18 20     Temp: 36.3 °C (97.3 °F) 36.1 °C (97 °F)  36.6 °C (97.9 °F)   TempSrc:       SpO2: 96% 96%  93%   Weight:       Height:           Intake/Output Summary (Last 24 hours) at 3/17/2025 0811  Last data filed at 3/17/2025 0454  Gross per 24 hour   Intake 134.63 ml   Output 300 ml   Net -165.37 ml       Wt Readings from Last 4 Encounters:   03/16/25 116 kg (255 lb)   12/11/24 115 kg (254 lb)   12/06/24 114 kg (252 lb 6.4 oz)   06/06/24 115 kg (253 lb)       Physical Examination:     GENERAL APPEARANCE: Well developed, well nourished, in no acute distress.  CHEST: Symmetric and non-tender.  INTEGUMENT: Skin warm and dry, without gross excoriationis or lesions.  HEENT: No gross abnormalities of conjunctiva, teeth, gums, oral mucosa  NECK: Supple, no JVD, no bruit. Thyroid not palpable. Carotid upstrokes normal.  NEURO/PSHCY: Alert and oriented x3; appropriate behavior and responses and responses, grossly normal cerebellar function with normal balance and coordination  LUNGS: Clear to auscultation bilaterally; normal respiratory effort.  HEART: S1, S2 irregular  ABDOMEN: Soft, nontender, no palpable hepatosplenomegaly, no mases, no bruits. Abdominal aorta not noted to be enlarged.  MUSCULOSKELETAL: Ambulatory with normal tandem gait.  EXTREMITIES: Warm with good color, no clubbing or cyanois.  1+ edema  PERIPHERAL VASCULAR: Pulses present and equally palpable; 1+ throughout. No femoral  bruits.      Lab:     CBC:   Results from last 7 days   Lab Units 03/17/25  0453 03/16/25  1256   WBC AUTO x10*3/uL 6.1 5.8   RBC AUTO x10*6/uL 4.59 4.58   HEMOGLOBIN g/dL 15.2 15.4   HEMATOCRIT % 44.2 43.8   MCV fL 96 96   MCH pg 33.1 33.6   MCHC g/dL 34.4 35.2   RDW % 12.9 12.5   PLATELETS AUTO x10*3/uL 128* 135*     CMP:    Results from last 7 days   Lab Units 03/17/25  0453 03/16/25  1256   SODIUM mmol/L 138 136   POTASSIUM mmol/L 4.2 4.4   CHLORIDE mmol/L 101 101   CO2 mmol/L 30 27   BUN mg/dL 13 15   CREATININE mg/dL 1.05 1.06   GLUCOSE mg/dL 138* 156*   PROTEIN TOTAL g/dL  --  7.1   CALCIUM mg/dL 9.1 9.6   BILIRUBIN TOTAL mg/dL  --  0.8   ALK PHOS U/L  --  66   AST U/L  --  25   ALT U/L  --  33     BMP:    Results from last 7 days   Lab Units 03/17/25  0453 03/16/25  1256   SODIUM mmol/L 138 136   POTASSIUM mmol/L 4.2 4.4   CHLORIDE mmol/L 101 101   CO2 mmol/L 30 27   BUN mg/dL 13 15   CREATININE mg/dL 1.05 1.06   CALCIUM mg/dL 9.1 9.6   GLUCOSE mg/dL 138* 156*     Magnesium:  Results from last 7 days   Lab Units 03/17/25  0453 03/16/25  1256   MAGNESIUM mg/dL 1.98 1.95     Troponin:    Results from last 7 days   Lab Units 03/17/25  0453 03/16/25  1853 03/16/25  1402   TROPHS ng/L 272* 192* 129*     BNP:   Results from last 7 days   Lab Units 03/16/25  1256   BNP pg/mL 100*     Lipid Panel:  Results from last 7 days   Lab Units 03/17/25  0453   HDL mg/dL 36.4   CHOLESTEROL/HDL RATIO  3.6   VLDL mg/dL 23   TRIGLYCERIDES mg/dL 117   NON HDL CHOL. mg/dL 95        Diagnostic Studies:     ECG 12 lead  Result Date: 3/16/2025    Atrial fibrillation Septal infarct (cited on or before 16-MAR-2025) Abnormal ECG When compared with ECG of 16-MAR-2025 11:13, (unconfirmed) No significant change was found See ED provider note for full interpretation and clinical correlation Confirmed by Lory Oh ([id number]) on 3/16/2025 2:20:04 PM      Radiology:     XR chest 1 view   Final Result   No acute pulmonary process.         MACRO   None        Signed by: Cj Castillo 3/16/2025 1:40 PM   Dictation workstation:   [id number]      Transthoracic Echo (TTE) Complete    (Results Pending)       Problem List:     Patient Active Problem List   Diagnosis    Atherosclerosis of native coronary artery of native heart with angina pectoris    Permanent atrial fibrillation (Multi)    Long term current use of anticoagulant therapy    Primary hypertension    Hyperlipidemia    Obstructive sleep apnea    Chronic venous insufficiency    Lymphedema    Morbid obesity (Multi)    Idiopathic chronic venous hypertension of left leg with inflammation    Idiopathic chronic venous hypertension of right leg with inflammation    Left chronic serous otitis media    Mixed conductive and sensorineural hearing loss of left ear with restricted hearing of right ear    Sensorineural hearing loss (SNHL) of right ear with restricted hearing of left ear    Otorrhea of left ear    Idiopathic polyneuropathy    NSTEMI (non-ST elevated myocardial infarction) (Multi)       Assessment:   Non-STEMI type I  History of CAD  Persistent A-fib  Diabetes  Hypertension  Dyslipidemia  Lower extremity lymphedema  Chronic venous insufficiency  Sleep apnea      Plan:   Tele monitoring  Serial enzymes  2d echo  Daily EKG's  Continue the heparin drip  Aspirin 81 mg daily  Lipitor 40 mg daily  Cozaar 50 mg daily  Toprol-XL 50 mg daily  Primidone 50 mg p.o. 3 times daily  Xarelto on hold since 3/15/2025  Ranexa 500 mg p.o. twice daily  Add Imdur 60 mg daily  Left heart catheterization plus or minus PCI today well informed of risk versus benefits verbalized understanding would like to proceed    Darrick Whitney CNP  [facility]    Of note, this documentation is completed using the Dragon Dictation system (voice recognition software). There may be spelling and/or grammatical errors that were not corrected prior to final  submission.    Electronically signed by RYLAND Marques, on 3/17/2025 at 8:11 AM    I have personally interviewed and examined the patient.   I have personally and independently reviewed labs and diagnostic testing.  I have personally verified the elements of the history and physical listed above and changes, if any, are noted.   I have personally reviewed the assessment and plan as documented by Dean Whitney, NICOLE, CNP and concur.    In summary, Mr. Cesar Myles has a history of atherosclerotic heart disease with prior angioplasty and drug-eluting stenting of the first obtuse marginal branch circumflex in 2010. He was also noted to have 50-60% stenosis of LAD. There was normal left ventricular systolic function. He has a history of permanent atrial fibrillation being managed with a rate control strategy. He is chronically anticoagulated with Xarelto. He has been asymptomatic with regards to his atrial fibrillation. He has a history of hypertension and hyperlipidemia. A myocardial perfusion study in June 2018 was negative for myocardial infarction or ischemia. LV ejection fraction was 59%. He achieved 3.9 METs. An echocardiogram showed normal LV systolic function along with mildly dilated right atrium and right ventricle.  There was mild impairment of RV systolic function. Estimated RVSP was 32 mm Hg. He has some chronic exertional chest discomfort and shortness of breath. He was placed on Ranexa with improvement of his symptoms. He has a history of obstructive sleep apnea for which he uses CPAP.     He underwent extensive evaluation and treatment by Dr. Gigi Bhardwaj for venous insufficiency, varicose veins, and lymphedema. He had ultrasound-guided foam sclerotherapy treatments. He is using a lymphedema pump regularly. He wears graduated compression stockings. His edema has significantly improved.      At the time of his visit in June 2024 he described some intermittent episodes of chest tightness.  The  episodes lasted approximately 10 to 15 minutes.  A follow-up exercise myocardial perfusion study June 20, 2024 was negative for ischemia or infarction.  Calculated LV ejection fraction of 68%.  Functional capacity was low and chronotropic response was significantly excessive suggesting deconditioning.     He follows regularly with neurology (Dr. Sridhar Tabor) for tremor and idiopathic progressive polyneuropathy.  He takes nortriptyline and primidone.  He was seen at urgent care November 30, 2024 for multiple symptoms consistent with an upper respiratory infection.  He was diagnosed with acute bronchitis and placed on doxycycline. He is on irbesartan for hypertension.  He and his wife are semiretired.  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       He was most recently seen by me in the office December 6, 2024 and was doing well.  He presented to the emergency department yesterday afternoon with complaints of intermittent chest discomfort which had been occurring over the past 2 weeks.  He noted the episodes were getting more frequent and more prolonged.  Yesterday morning he developed onset of more severe chest tightness across his entire chest while he was at rest.  This was associated with some profuse diaphoresis.  He took sublingual nitroglycerin with some relief.  He decided to come to the hospital.  Upon arrival to emergency department his vital signs are stable with exception of blood pressure 184/94 mmHg.  Oxygen saturation 94%.  CBC and CMP normal with exception of glucose 156.  BNP was 100.  High-sensitivity troponin level 123 and 129.  Chest x-ray did not show any active disease.  EKG showed atrial fibrillation with nonspecific ST-T wave changes.  Repeat EKG shows atrial fibrillation with new anterolateral ST-T wave changes.  He was admitted to telemetry.  He was continued on aspirin, metoprolol, ranolazine, angiotensin receptor blocker, and atorvastatin.    He currently sitting up in the chair  and visiting with his wife.  He reports some very mild vague discomfort.  His blood pressure is modestly improved at 150/82 mmHg.  Will increase losartan to 100 mg daily.  He presents with unstable angina and type I non-STEMI.  He would benefit from repeat cardiac catheterization along with angioplasty and stenting of the necessary.  Risks and benefits were discussed and he is agreeable to proceed.  Other recommendations pending these results.  Echocardiogram will also be reviewed once completed.

## 2025-03-17 NOTE — DISCHARGE INSTRUCTIONS

## 2025-03-17 NOTE — CARE PLAN
The patient's goals for the shift include decrease SOB    The clinical goals for the shift include increase the strength in my legs  Problem: Pain - Adult  Goal: Verbalizes/displays adequate comfort level or baseline comfort level  Outcome: Progressing

## 2025-03-18 ENCOUNTER — APPOINTMENT (OUTPATIENT)
Dept: CARDIOLOGY | Facility: HOSPITAL | Age: 80
End: 2025-03-18
Payer: MEDICARE

## 2025-03-18 VITALS
SYSTOLIC BLOOD PRESSURE: 142 MMHG | DIASTOLIC BLOOD PRESSURE: 78 MMHG | TEMPERATURE: 97.5 F | WEIGHT: 255 LBS | OXYGEN SATURATION: 95 % | BODY MASS INDEX: 36.51 KG/M2 | RESPIRATION RATE: 18 BRPM | HEART RATE: 79 BPM | HEIGHT: 70 IN

## 2025-03-18 LAB
ANION GAP SERPL CALC-SCNC: 11 MMOL/L (ref 10–20)
AORTIC VALVE MEAN GRADIENT: 4 MMHG
AORTIC VALVE PEAK VELOCITY: 1.18 M/S
ATRIAL RATE: 43 BPM
AV PEAK GRADIENT: 6 MMHG
AVA (PEAK VEL): 2.32 CM2
AVA (VTI): 2.1 CM2
BASOPHILS # BLD AUTO: 0.03 X10*3/UL (ref 0–0.1)
BASOPHILS NFR BLD AUTO: 0.4 %
BUN SERPL-MCNC: 15 MG/DL (ref 6–23)
CALCIUM SERPL-MCNC: 8.8 MG/DL (ref 8.6–10.3)
CHLORIDE SERPL-SCNC: 102 MMOL/L (ref 98–107)
CO2 SERPL-SCNC: 27 MMOL/L (ref 21–32)
CREAT SERPL-MCNC: 1.27 MG/DL (ref 0.5–1.3)
EGFRCR SERPLBLD CKD-EPI 2021: 57 ML/MIN/1.73M*2
EJECTION FRACTION APICAL 4 CHAMBER: 61
EJECTION FRACTION: 60 %
EOSINOPHIL # BLD AUTO: 0.08 X10*3/UL (ref 0–0.4)
EOSINOPHIL NFR BLD AUTO: 0.9 %
ERYTHROCYTE [DISTWIDTH] IN BLOOD BY AUTOMATED COUNT: 12.9 % (ref 11.5–14.5)
GLUCOSE BLD MANUAL STRIP-MCNC: 135 MG/DL (ref 74–99)
GLUCOSE BLD MANUAL STRIP-MCNC: 160 MG/DL (ref 74–99)
GLUCOSE SERPL-MCNC: 144 MG/DL (ref 74–99)
HCT VFR BLD AUTO: 42.6 % (ref 41–52)
HGB BLD-MCNC: 14.8 G/DL (ref 13.5–17.5)
IMM GRANULOCYTES # BLD AUTO: 0.03 X10*3/UL (ref 0–0.5)
IMM GRANULOCYTES NFR BLD AUTO: 0.4 % (ref 0–0.9)
LEFT ATRIUM VOLUME AREA LENGTH INDEX BSA: 37.5 ML/M2
LEFT VENTRICLE INTERNAL DIMENSION DIASTOLE: 4.7 CM (ref 3.5–6)
LEFT VENTRICULAR OUTFLOW TRACT DIAMETER: 2 CM
LV EJECTION FRACTION BIPLANE: 62 %
LYMPHOCYTES # BLD AUTO: 1.79 X10*3/UL (ref 0.8–3)
LYMPHOCYTES NFR BLD AUTO: 21.2 %
MAGNESIUM SERPL-MCNC: 1.95 MG/DL (ref 1.6–2.4)
MCH RBC QN AUTO: 33.5 PG (ref 26–34)
MCHC RBC AUTO-ENTMCNC: 34.7 G/DL (ref 32–36)
MCV RBC AUTO: 96 FL (ref 80–100)
MONOCYTES # BLD AUTO: 1.09 X10*3/UL (ref 0.05–0.8)
MONOCYTES NFR BLD AUTO: 12.9 %
NEUTROPHILS # BLD AUTO: 5.41 X10*3/UL (ref 1.6–5.5)
NEUTROPHILS NFR BLD AUTO: 64.2 %
NRBC BLD-RTO: 0 /100 WBCS (ref 0–0)
PLATELET # BLD AUTO: 127 X10*3/UL (ref 150–450)
POTASSIUM SERPL-SCNC: 4.2 MMOL/L (ref 3.5–5.3)
Q ONSET: 227 MS
QRS COUNT: 9 BEATS
QRS DURATION: 96 MS
QT INTERVAL: 416 MS
QTC CALCULATION(BAZETT): 411 MS
QTC FREDERICIA: 413 MS
R AXIS: -30 DEGREES
RBC # BLD AUTO: 4.42 X10*6/UL (ref 4.5–5.9)
RIGHT VENTRICLE PEAK SYSTOLIC PRESSURE: 41.2 MMHG
SODIUM SERPL-SCNC: 136 MMOL/L (ref 136–145)
T AXIS: -51 DEGREES
T OFFSET: 435 MS
UFH PPP CHRO-ACNC: 0.6 IU/ML
UFH PPP CHRO-ACNC: 0.7 IU/ML
VENTRICULAR RATE: 59 BPM
WBC # BLD AUTO: 8.4 X10*3/UL (ref 4.4–11.3)

## 2025-03-18 PROCEDURE — 80048 BASIC METABOLIC PNL TOTAL CA: CPT | Performed by: STUDENT IN AN ORGANIZED HEALTH CARE EDUCATION/TRAINING PROGRAM

## 2025-03-18 PROCEDURE — 93306 TTE W/DOPPLER COMPLETE: CPT | Performed by: INTERNAL MEDICINE

## 2025-03-18 PROCEDURE — 85520 HEPARIN ASSAY: CPT | Performed by: STUDENT IN AN ORGANIZED HEALTH CARE EDUCATION/TRAINING PROGRAM

## 2025-03-18 PROCEDURE — 99239 HOSP IP/OBS DSCHRG MGMT >30: CPT | Performed by: STUDENT IN AN ORGANIZED HEALTH CARE EDUCATION/TRAINING PROGRAM

## 2025-03-18 PROCEDURE — 85025 COMPLETE CBC W/AUTO DIFF WBC: CPT | Performed by: STUDENT IN AN ORGANIZED HEALTH CARE EDUCATION/TRAINING PROGRAM

## 2025-03-18 PROCEDURE — 2500000001 HC RX 250 WO HCPCS SELF ADMINISTERED DRUGS (ALT 637 FOR MEDICARE OP): Performed by: NURSE PRACTITIONER

## 2025-03-18 PROCEDURE — 36415 COLL VENOUS BLD VENIPUNCTURE: CPT | Performed by: STUDENT IN AN ORGANIZED HEALTH CARE EDUCATION/TRAINING PROGRAM

## 2025-03-18 PROCEDURE — 2500000001 HC RX 250 WO HCPCS SELF ADMINISTERED DRUGS (ALT 637 FOR MEDICARE OP): Performed by: INTERNAL MEDICINE

## 2025-03-18 PROCEDURE — 99233 SBSQ HOSP IP/OBS HIGH 50: CPT | Performed by: NURSE PRACTITIONER

## 2025-03-18 PROCEDURE — 5A09357 ASSISTANCE WITH RESPIRATORY VENTILATION, LESS THAN 24 CONSECUTIVE HOURS, CONTINUOUS POSITIVE AIRWAY PRESSURE: ICD-10-PCS | Performed by: STUDENT IN AN ORGANIZED HEALTH CARE EDUCATION/TRAINING PROGRAM

## 2025-03-18 PROCEDURE — 82947 ASSAY GLUCOSE BLOOD QUANT: CPT

## 2025-03-18 PROCEDURE — 2500000002 HC RX 250 W HCPCS SELF ADMINISTERED DRUGS (ALT 637 FOR MEDICARE OP, ALT 636 FOR OP/ED): Performed by: NURSE PRACTITIONER

## 2025-03-18 PROCEDURE — 83735 ASSAY OF MAGNESIUM: CPT | Performed by: STUDENT IN AN ORGANIZED HEALTH CARE EDUCATION/TRAINING PROGRAM

## 2025-03-18 PROCEDURE — 2500000001 HC RX 250 WO HCPCS SELF ADMINISTERED DRUGS (ALT 637 FOR MEDICARE OP): Performed by: STUDENT IN AN ORGANIZED HEALTH CARE EDUCATION/TRAINING PROGRAM

## 2025-03-18 PROCEDURE — 93306 TTE W/DOPPLER COMPLETE: CPT

## 2025-03-18 PROCEDURE — 93005 ELECTROCARDIOGRAM TRACING: CPT

## 2025-03-18 PROCEDURE — 2500000002 HC RX 250 W HCPCS SELF ADMINISTERED DRUGS (ALT 637 FOR MEDICARE OP, ALT 636 FOR OP/ED): Performed by: STUDENT IN AN ORGANIZED HEALTH CARE EDUCATION/TRAINING PROGRAM

## 2025-03-18 RX ORDER — METFORMIN HYDROCHLORIDE 500 MG/1
500 TABLET ORAL
Qty: 60 TABLET | Refills: 2 | Status: SHIPPED | OUTPATIENT
Start: 2025-03-18 | End: 2025-04-17

## 2025-03-18 RX ORDER — ISOSORBIDE MONONITRATE 60 MG/1
60 TABLET, EXTENDED RELEASE ORAL DAILY
Qty: 30 TABLET | Refills: 11 | Status: SHIPPED | OUTPATIENT
Start: 2025-03-19 | End: 2025-03-28 | Stop reason: WASHOUT

## 2025-03-18 RX ADMIN — RANOLAZINE 500 MG: 500 TABLET, FILM COATED, EXTENDED RELEASE ORAL at 09:08

## 2025-03-18 RX ADMIN — LOSARTAN POTASSIUM 100 MG: 100 TABLET, FILM COATED ORAL at 09:08

## 2025-03-18 RX ADMIN — ISOSORBIDE MONONITRATE 60 MG: 60 TABLET, EXTENDED RELEASE ORAL at 09:08

## 2025-03-18 RX ADMIN — INSULIN LISPRO 2 UNITS: 100 INJECTION, SOLUTION INTRAVENOUS; SUBCUTANEOUS at 11:30

## 2025-03-18 RX ADMIN — CLOPIDOGREL 75 MG: 75 TABLET ORAL at 09:08

## 2025-03-18 RX ADMIN — PRIMIDONE 50 MG: 50 TABLET ORAL at 09:07

## 2025-03-18 RX ADMIN — RIVAROXABAN 20 MG: 20 TABLET, FILM COATED ORAL at 11:30

## 2025-03-18 RX ADMIN — TERAZOSIN HYDROCHLORIDE 5 MG: 5 CAPSULE ORAL at 09:07

## 2025-03-18 RX ADMIN — ASPIRIN 81 MG: 81 TABLET, COATED ORAL at 09:08

## 2025-03-18 ASSESSMENT — PAIN SCALES - GENERAL: PAINLEVEL_OUTOF10: 0 - NO PAIN

## 2025-03-18 ASSESSMENT — PAIN - FUNCTIONAL ASSESSMENT: PAIN_FUNCTIONAL_ASSESSMENT: 0-10

## 2025-03-18 ASSESSMENT — ACTIVITIES OF DAILY LIVING (ADL): LACK_OF_TRANSPORTATION: NO

## 2025-03-18 NOTE — CARE PLAN
The patient's goals for the shift include decrease SOB    The clinical goals for the shift include patient will remain hemodynamically stable throughout shift

## 2025-03-18 NOTE — DISCHARGE SUMMARY
Discharge Diagnosis  NSTEMI (non-ST elevated myocardial infarction) (Multi)    Issues Requiring Follow-Up  -Follow up with Cardiology in 2 weeks or sooner.    -Follow up with PCP.    Test Results Pending At Discharge  Pending Labs       Order Current Status    Heparin Assay In process            Hospital Course   Mr. Stewart is a 53-year-old male with a history of A-fib on Xarelto, T2DM, CAD with 3x stents, HLD, AMAYA, resting tremors, HTN, and BPH who presented to the [facility] ED on 3/16/25 for chest pain over the last 2 weeks and achiness in the left chest. Patient was found to have elevated troponin.    Patient was admitted for NSTEMI and was initiated on Plavix 75 Mg daily and aspirin 81 Mg x 1 week only, with a hold on Xarelto, and was consulted by cardiology who performed a LHC/PCI with MARTHA placement of the mid LAD through right groin vascade site on 3/18/25.  Patient was stable status post procedure.  Post-procedure TTE was completed on 3/18 and showed the followin. Left ventricular ejection fraction is normal, by visual estimate at 60%.   2. There is moderately reduced right ventricular systolic function.   3. Moderately enlarged right ventricle.   4. The right atrial size is moderate to severely dilated.   5. There is no evidence of mitral valve stenosis.   6. Mild mitral valve regurgitation.   7. Moderate to severe tricuspid regurgitation.   8. Aortic valve stenosis is not present.   9. Moderately elevated pulmonary artery pressure.  10. The inferior vena cava appears moderately dilated.   and patient was continuously monitored on telemetry with pending discharge on 3/18.    Patient will currently be discharged today on 2025.  He has been instructed to follow-up with cardiology in 2 weeks. He has been instructed to resume Xarelto this evening, continue aspirin 81 Mg daily for 1 week, and Plavix 75 Mg daily.  He has also been instructed to increase metformin from 1 daily to 2 daily to control  HgA1c and follow up with his PCP.        Pertinent Physical Exam At Time of Discharge  Physical Exam  HENT:      Head: Normocephalic and atraumatic.   Eyes:      Extraocular Movements: Extraocular movements intact.   Cardiovascular:      Rate and Rhythm: Normal rate and regular rhythm.   Pulmonary:      Effort: No respiratory distress.      Breath sounds: Normal breath sounds.   Neurological:      Mental Status: He is alert and oriented to person, place, and time.   Psychiatric:         Mood and Affect: Mood normal.         Home Medications     Medication List      START taking these medications     clopidogrel 75 mg tablet; Commonly known as: Plavix; Take 1 tablet (75   mg) by mouth once daily for 365 doses. Do not fill before March 18, 2025.   nitroglycerin 0.4 mg SL tablet; Commonly known as: Nitrostat; Place 1   tablet (0.4 mg) under the tongue every 5 minutes if needed for chest pain.   May repeat every 5 minutes for up to 3 doses.     CHANGE how you take these medications     aspirin 81 mg EC tablet; Take 1 tablet (81 mg) by mouth once daily. X 1   week only, then STOP.; What changed: additional instructions     ASK your doctor about these medications     ascorbic acid 500 mg tablet; Commonly known as: Vitamin C   atorvastatin 40 mg tablet; Commonly known as: Lipitor; Take 1 tablet (40   mg) by mouth once daily.   biotin 10 mg tablet   cholecalciferol 5,000 Units tablet; Commonly known as: Vitamin D-3   finasteride 5 mg tablet; Commonly known as: Proscar   FISH OIL CONCENTRATE ORAL   furosemide 20 mg tablet; Commonly known as: Lasix; Take 1 tablet (20 mg)   by mouth every other day.   irbesartan 150 mg tablet; Commonly known as: Avapro; Take 1 tablet (150   mg) by mouth once daily.   metFORMIN 500 mg tablet; Commonly known as: Glucophage   metoprolol succinate XL 50 mg 24 hr tablet; Commonly known as:   Toprol-XL; Take 1 tablet (50 mg) by mouth once daily.   multivitamin tablet   nortriptyline 10 mg capsule;  Commonly known as: Pamelor   potassium chloride CR 10 mEq ER tablet; Commonly known as: Klor-Con M10;   Take 1 tablet (10 mEq) by mouth once daily.   primidone 50 mg tablet; Commonly known as: Mysoline   ranolazine 500 mg 12 hr tablet; Commonly known as: Ranexa; Take 1 tablet   (500 mg) by mouth 2 times a day.   * rivaroxaban 20 mg tablet; Commonly known as: Xarelto; Take 1 tablet   (20 mg) by mouth once daily in the evening. Take with meals. Take with   food.   * rivaroxaban 10 mg tablet; Commonly known as: Xarelto; Take 2 tablets   (20 mg) by mouth once daily.   terazosin 5 mg capsule; Commonly known as: Hytrin; Take 1 capsule (5 mg)   by mouth once daily.  * This list has 2 medication(s) that are the same as other medications   prescribed for you. Read the directions carefully, and ask your doctor or   other care provider to review them with you.       Outpatient Follow-Up  Future Appointments   Date Time Provider Department [place]   3/27/2025 10:00 AM Rafi Soto MD [id number] [place]   6/12/2025 10:45 AM Rafi Soto MD [id number] [place]   6/13/2025 10:00 [id number]   3/11/2026 11:00 AM Gigi Bhardwaj MD [id number] [place]       Kimberlee Palomares PA-C

## 2025-03-18 NOTE — PROGRESS NOTES
03/18/25 1127   Discharge Planning   Living Arrangements Spouse/significant other   Support Systems Spouse/significant other   Assistance Needed none, Independent PTA   Type of Residence Private residence   Who is requesting discharge planning? Provider   Home or Post Acute Services None   Expected Discharge Disposition Home   Does the patient need discharge transport arranged? No   RoundTrip coordination needed? No   Has discharge transport been arranged? No   Transportation Needs   In the past 12 months, has lack of transportation kept you from medical appointments or from getting medications? no   In the past 12 months, has lack of transportation kept you from meetings, work, or from getting things needed for daily living? No   Patient Choice   Patient / Family choosing to utilize agency / facility established prior to hospitalization No   Stroke Family Assessment   Stroke Family Assessment Needed No   Intensity of Service   Intensity of Service 0-30 min     Per review,  Pt is s/p PCI /LHC   Patient underwent successful PCI with 1 MARTHA placed to the mid LAD  Patient was initiated on Plavix .  Xarelto can be resumed on 3/18.   Met with patient and spouse at bedside, pt is independent.  Denies any home going needs.  Medications were sent to [facility] and pt/ family have been updated.  Plan is home today .

## 2025-03-18 NOTE — PROGRESS NOTES
[facility] Progress Note           Rounding JEFF/Cardiologist:  Dean Whitney, APRN-CNP,   Primary Cardiologist: Dr. Rafi Soto    Date:  3/18/2025  Patient:  Cesar Myles  YOB: 1945  MRN:  [id number]   Admit Date:  3/16/2025      SUBJECTIVE:    3/1825  Patient is awake alert and oriented x 3.  He states he is feeling much better at this time he denies any chest pain or shortness of breath he does want to be discharged home he is going to follow-up with Dr. Soto as an outpatient  Right groin soft Mentax no hematoma 0 bruit slightly ecchymotic  EKGs A-fib rate 59 no acute changes    CONCLUSIONS:   1. Left ventricular ejection fraction is normal, by visual estimate at 60%.   2. There is moderately reduced right ventricular systolic function.   3. Moderately enlarged right ventricle.   4. The right atrial size is moderate to severely dilated.   5. There is no evidence of mitral valve stenosis.   6. Mild mitral valve regurgitation.   7. Moderate to severe tricuspid regurgitation.   8. Aortic valve stenosis is not present.   9. Moderately elevated pulmonary artery pressure.  10. The inferior vena cava appears moderately dilated.         VITALS:     Vitals:    03/17/25 1923 03/18/25 0021 03/18/25 0400 03/18/25 0853   BP: 121/65 142/79 111/66 157/88   BP Location: Left arm Left arm Left arm    Patient Position: Lying Lying Lying    Pulse: 71 64 76 80   Resp: 18 18 18    Temp: 36.1 °C (97 °F) 36.1 °C (97 °F) 36.1 °C (97 °F) 36.6 °C (97.9 °F)   TempSrc: Temporal Temporal Temporal    SpO2: 94% 95% 95% 95%   Weight:       Height:           Intake/Output Summary (Last 24 hours) at 3/18/2025 0932  Last data filed at 3/17/2025 1900  Gross per 24 hour   Intake 240 ml   Output 0 ml   Net 240 ml       Wt Readings from Last 4 Encounters:   03/16/25 116 kg (255 lb)   12/11/24 115 kg (254 lb)   12/06/24 114 kg (252 lb 6.4 oz)   06/06/24 115 kg (253 lb)       CURRENT HOSPITAL MEDICATIONS:    aspirin, 81 mg, oral, Daily  atorvastatin, 40 mg, oral, Nightly  clopidogrel, 75 mg, oral, Daily  finasteride, 2.5 mg, oral, Daily  [Held by provider] furosemide, 20 mg, oral, Every other day  insulin lispro, 0-10 Units, subcutaneous, Before meals & nightly  isosorbide mononitrate ER, 60 mg, oral, Daily  losartan, 100 mg, oral, Daily  losartan, 50 mg, oral, Once  metoprolol succinate XL, 50 mg, oral, Daily  nortriptyline, 20 mg, oral, Nightly  primidone, 50 mg, oral, TID  ranolazine, 500 mg, oral, BID  [START ON 3/19/2025] rivaroxaban, 20 mg, oral, Daily  terazosin, 5 mg, oral, Daily         Current Outpatient Medications   Medication Instructions    ascorbic acid (VITAMIN C) 500 mg, Daily    aspirin 81 mg, oral, Daily, X 1 week only, then STOP.    atorvastatin (LIPITOR) 40 mg, oral, Daily    biotin 10 mg tablet 3 tablets, Daily    cholecalciferol (Vitamin D-3) 5,000 Units tablet 1 tablet, Daily    clopidogrel (PLAVIX) 75 mg, oral, Daily    finasteride (Proscar) 5 mg tablet 0.5 tablets, Daily    furosemide (LASIX) 20 mg, oral, Every other day    irbesartan (AVAPRO) 150 mg, oral, Daily    [START ON 3/19/2025] isosorbide mononitrate ER (IMDUR) 60 mg, oral, Daily, Do not crush or chew.    metFORMIN (GLUCOPHAGE) 500 mg, Daily with breakfast    metoprolol succinate XL (TOPROL-XL) 50 mg, oral, Daily    multivitamin tablet 1 tablet, Daily    nitroglycerin (NITROSTAT) 0.4 mg, sublingual, Every 5 min PRN, May repeat every 5 minutes for up to 3 doses.    nortriptyline (PAMELOR) 20 mg    omega-3 fatty acids (FISH OIL CONCENTRATE ORAL) Fish Oil OIL take 1 cap daily Quantity: 0 Refills: 0 Ordered: 28-Oct-2021 DO Active    potassium chloride CR (Klor-Con M10) 10 mEq ER tablet 10 mEq, oral, Daily    primidone (Mysoline) 50 mg tablet 1 tablet, 3 times daily    ranolazine (RANEXA) 500 mg, oral, 2 times daily    rivaroxaban (XARELTO) 20 mg, oral, Daily with evening meal, Take with food.    rivaroxaban (XARELTO) 20 mg, oral, Daily     terazosin (HYTRIN) 5 mg, oral, Daily        PHYSICAL EXAMINATION:   GENERAL:  Well developed, well nourished, in no acute distress.  CHEST:  Symmetric and nontender.  NEURO/PSYCH:  Alert and oriented times three with approppriate behavior and responses.  NECK:  Supple, no JVD, no bruit.  LUNGS:  Clear to auscultation bilaterally, normal respiratory effort.  HEART: S1, S2 irregular  EXTREMITIES:  Warm with good color, no clubbing or cyanosis.  There is no edema noted.  Right groin 0 hematoma 0 bruit slightly ecchymotic  PERIPHERAL VASCULAR:  Pulses present and equally palpable; 2+ throughout.      LAB DATA:     CBC:   Results from last 7 days   Lab Units 03/18/25 0337 03/17/25 0453 03/16/25  1256   WBC AUTO x10*3/uL 8.4 6.1 5.8   RBC AUTO x10*6/uL 4.42* 4.59 4.58   HEMOGLOBIN g/dL 14.8 15.2 15.4   HEMATOCRIT % 42.6 44.2 43.8   MCV fL 96 96 96   MCH pg 33.5 33.1 33.6   MCHC g/dL 34.7 34.4 35.2   RDW % 12.9 12.9 12.5   PLATELETS AUTO x10*3/uL 127* 128* 135*     CMP:    Results from last 7 days   Lab Units 03/18/25 0337 03/17/25 0453 03/16/25  1256   SODIUM mmol/L 136 138 136   POTASSIUM mmol/L 4.2 4.2 4.4   CHLORIDE mmol/L 102 101 101   CO2 mmol/L 27 30 27   BUN mg/dL 15 13 15   CREATININE mg/dL 1.27 1.05 1.06   GLUCOSE mg/dL 144* 138* 156*   PROTEIN TOTAL g/dL  --   --  7.1   CALCIUM mg/dL 8.8 9.1 9.6   BILIRUBIN TOTAL mg/dL  --   --  0.8   ALK PHOS U/L  --   --  66   AST U/L  --   --  25   ALT U/L  --   --  33     BMP:    Results from last 7 days   Lab Units 03/18/25 0337 03/17/25 0453 03/16/25  1256   SODIUM mmol/L 136 138 136   POTASSIUM mmol/L 4.2 4.2 4.4   CHLORIDE mmol/L 102 101 101   CO2 mmol/L 27 30 27   BUN mg/dL 15 13 15   CREATININE mg/dL 1.27 1.05 1.06   CALCIUM mg/dL 8.8 9.1 9.6   GLUCOSE mg/dL 144* 138* 156*     Magnesium:  Results from last 7 days   Lab Units 03/18/25  0337 03/17/25  0453 03/16/25  1256   MAGNESIUM mg/dL 1.95 1.98 1.95     Troponin:    Results from last 7 days   Lab Units  03/17/25  0453 03/16/25  1853 03/16/25  1402   TROPHS ng/L 272* 192* 129*     BNP:   Results from last 7 days   Lab Units 03/16/25  1256   BNP pg/mL 100*     Lipid Panel:  Results from last 7 days   Lab Units 03/17/25  0453   HDL mg/dL 36.4   CHOLESTEROL/HDL RATIO  3.6   VLDL mg/dL 23   TRIGLYCERIDES mg/dL 117   NON HDL CHOL. mg/dL 95        DIAGNOSTIC TESTING:   @No results found for this or any previous visit.    Cardiac Catheterization Procedure    Result Date: 3/17/2025   [facility], Cath Lab        [address] [place] [address] Cardiovascular Catheterization Report Patient Name:      KOBY NESS     Performing Physician:  Rabia Sebastian MD Study Date:        3/17/2025            Verifying Physician:   Rabia Sebastian MD MRN/PID:           [id number]             Cardiologist/Co-Scrub: Accession#:        [id number]         Ordering Provider:     [id number] LEXI KIMBALL Date of Birth/Age: 1945 / 79 years Cardiologist: Gender:            M                    Fellow: Encounter#:        [id number]           Surgeon:  Study:            Coronary Arteriogram Additional Study: Left Heart Cath Additional Study: PCI - Percutaneous Coronary Intervention  Indications: KOBY NESS is a 80 year old male who presents with dyslipidemia, hypertension, prior percutaneous coronary intervention, Former Smoker, atrial fibrillation, diabetes, coronary artery disease and a chest pain assessment of typical angina. NSTE - ACS. Stress test performed: No. CTA performed: No. Yousuf accessed: No. LVEF Assessed: No. Cardiac arrest: No. Cardiac surgical consult: No. Cardiovascular Instability: No Frailty status of patient entering lab: 5 = Mildly frail.  Procedure Description: After infiltration  with 2% Lidocaine, the right femoral artery was cannulated with a modified Seldinger technique. Subsequently a 5 Hong Konger sheath was placed in the right femoral artery. The arterial sheath was sized up to 6 Hong Konger. Selective coronary catheterization was performed using a 5 Fr catheter(s) exchanged over a guide wire to cannulate the coronary arteries. A JL 4 tip catheter was used for left coronary injections. A 3DRC tip catheter was used for right coronary injections. Multiple injections of contrast were made into the left and right coronary arteries with angiograms recorded in multiple projections. Retrograde left heart catheterizion was accomplished with a 5 Fr. pigtail catheter. A single plane left ventriculogram was recorded in the 30 degree ENCARNACION projection. The contrast dose was 20 ml injected at 10 ml/sec. The catheter was then withdrawn across the aortic valve under continuous pressure monitoring and removed. After completion of the procedure, femoral artery angiography was performed. This demonstrated a common femoral artery puncture appropriate for closure. A Vascade 6/7Fr vascular closure Device was placed per protocol.  Coronary Angiography: The coronary circulation is left dominant.  Left Main Coronary Artery: There is 0% stenosis in the entire left main coronary artery.  Left Anterior Descending Coronary Artery Distribution: There is 40% stenosis in the proximal left anterior descending artery. Contains patent previously placed stents. There is 95% stenosis in the mid left anterior descending artery. The devices advanced to the mid LAD lesion were: a balloon was inflated for pre-dilation, Resolute Collin 3.00x18 stent was deployed in the lesion a balloon was inflated for post-dilation. Residual stenosis is 0 %.  Circumflex Coronary Artery Distribution: There is 30% stenosis in the the OM 2 Circumflex artery. There is 30% stenosis in the PDA Circumflex artery.  Right Coronary Artery Distribution: The Right  Coronary Artery is a small, non-dominant vessel. There is 0stenosis in the the entire Right Coronary Artery.  Coronary Interventions: Angiography reveals a 95% stenosis of the mid left anterior descending coronary artery. Pre-intervention ZEINAB flow was 3. Percutaneous coronary intervention was performed within the mid left anterior descending. The stenosis was successfully reduced from 95% to 0%. Post-intervention ZEINAB flow was 3.  Hemo Personnel: +---------------------------+---------+ Name                       Duty      +---------------------------+---------+ Joaquim Membreno MD 1 +---------------------------+---------+  Hemodynamic Pressures:  +----+---------------+----------+-------------+--------------+-------+---------+ Site   Date Time   Phase NameSystolic mmHgDiastolic mmHgED mmHgMean mmHg +----+---------------+----------+-------------+--------------+-------+---------+   AO      3/17/2025  AIR REST          122            70              87         12:09:25 PM                                                      +----+---------------+----------+-------------+--------------+-------+---------+   AO      3/17/2025  AIR REST           98            71              84         12:12:18 PM                                                      +----+---------------+----------+-------------+--------------+-------+---------+   LV      3/17/2025  AIR REST           85             0     11                  12:17:49 PM                                                      +----+---------------+----------+-------------+--------------+-------+---------+   LV      3/17/2025  AIR REST           88            11     21                  12:17:54 PM                                                      +----+---------------+----------+-------------+--------------+-------+---------+   LV      3/17/2025  AIR REST          107              4     23                  12:18:22 PM                                                      +----+---------------+----------+-------------+--------------+-------+---------+   AO      3/17/2025  AIR REST          103            65              81         12:28:42 PM                                                      +----+---------------+----------+-------------+--------------+-------+---------+  Cardiac Cath Post Procedure Notes: Post Procedure Diagnosis: MARTHA of LAD. Blood Loss:               Estimated blood loss during the procedure was 0 mls. Specimens Removed:        Number of specimen(s) removed: none. ____________________________________________________________________________________ CONCLUSIONS:  1. The entire Left Main: 0% stenosis.  2. Left Anterior Descending Artery: contains patent previously placed stents.  3. Proximal LAD Lesion: The percent stenosis is 40%.  4. Mid LAD Lesion: The percent stenosis is 95%.  5. Mid LAD Lesion: pre-dilation, Resolute Catlett 3.00x18 post-dilation: 0% residual stenosis.  6. OM 2 CX Lesion: The percent stenosis is 30%.  7. PDA Cx Lesion: The percent stenosis is 30%.  8. The entire RCA Lesion: The percent stenosis is 0%. ICD 10 Codes: Non ST elevation (NSTEMI) myocardial infarction-I21.4  CPT Codes: Left Heart Cath (visualization of coronaries) and LV-20037; Moderate Sedation Services initial 15 minutes patient >5 years-08206; Stent w angioplasty Left Anterior Descending single major Artery branch (PCI)-51841.LD  [id number] Joaquim Sebastian MD Performing Physician Electronically signed by [id number] Joaquim Sebastian MD on 3/17/2025 at 1:43:17 PM  ** Final **     Electrocardiogram 12 Lead    Result Date: 3/17/2025  Atrial fibrillation Anterior infarct , age undetermined ST & T wave abnormality, consider lateral ischemia Abnormal ECG When compared with ECG of 17-MAR-2025 06:37, (unconfirmed) Nonspecific T wave abnormality has replaced inverted T waves  in Inferior leads T wave inversion no longer evident in Anterior leads    ECG 12 Lead    Result Date: 3/17/2025  Atrial fibrillation ST & T wave abnormality, consider inferior ischemia ST & T wave abnormality, consider anterolateral ischemia Abnormal ECG When compared with ECG of 16-MAR-2025 14:06, ST now depressed in Anterior leads T wave inversion now evident in Inferior leads T wave inversion now evident in Anterolateral leads QT has lengthened    ECG 12 lead    Result Date: 3/16/2025  Atrial fibrillation Septal infarct (cited on or before 16-MAR-2025) Abnormal ECG When compared with ECG of 16-MAR-2025 11:13, (unconfirmed) No significant change was found See ED provider note for full interpretation and clinical correlation Confirmed by Lory Oh ([id number]) on 3/16/2025 2:20:04 PM    ECG 12 lead    Result Date: 3/16/2025  Atrial fibrillation Anteroseptal infarct , age undetermined Abnormal ECG When compared with ECG of 19-FEB-2010 06:32, Atrial fibrillation has replaced Sinus rhythm Anteroseptal infarct is now Present ST no longer elevated in Lateral leads Nonspecific T wave abnormality, worse in Anterior leads See ED provider note for full interpretation and clinical correlation Confirmed by Lory Oh ([id number]) on 3/16/2025 2:19:55 PM    XR chest 1 view    Result Date: 3/16/2025  Interpreted By:  Cj Castillo, STUDY: XR CHEST 1 VIEW; 3/16/2025 1:19 pm   INDICATION: Signs/Symptoms:Chest Pain   COMPARISON: None.   ACCESSION NUMBER(S): [id number]   ORDERING CLINICIAN: KEELY BERRIOS   TECHNIQUE: Single frontal view of the chest performed.   FINDINGS: LINES AND DEVICES: None.   LUNGS: No focal consolidation, pulmonary edema, pleural effusion or pneumothorax.   CARDIOMEDIASTINAL SILHOUETTE: Mild cardiomegaly.   OTHER: No obvious displaced rib fracture is identified as imaged.       No acute pulmonary process.   MACRO None   Signed by: Cj Castillo 3/16/2025 1:40 PM Dictation workstation:    [id number]       Transthoracic Echo (TTE) Complete   Final Result      Cardiac Catheterization Procedure   Final Result      XR chest 1 view   Final Result   No acute pulmonary process.        MACRO   None        Signed by: Cj Castillo 3/16/2025 1:40 PM   Dictation workstation:   [id number]            RADIOLOGY:     Transthoracic Echo (TTE) Complete   Final Result      Cardiac Catheterization Procedure   Final Result      XR chest 1 view   Final Result   No acute pulmonary process.        MACRO   None        Signed by: Cj Castillo 3/16/2025 1:40 PM   Dictation workstation:   [id number]          PROBLEM LIST     Patient Active Problem List   Diagnosis    Atherosclerosis of native coronary artery of native heart with angina pectoris    Permanent atrial fibrillation (Multi)    Long term current use of anticoagulant therapy    Primary hypertension    Hyperlipidemia    Obstructive sleep apnea    Chronic venous insufficiency    Lymphedema    Morbid obesity (Multi)    Idiopathic chronic venous hypertension of left leg with inflammation    Idiopathic chronic venous hypertension of right leg with inflammation    Left chronic serous otitis media    Mixed conductive and sensorineural hearing loss of left ear with restricted hearing of right ear    Sensorineural hearing loss (SNHL) of right ear with restricted hearing of left ear    Otorrhea of left ear    Idiopathic polyneuropathy    NSTEMI (non-ST elevated myocardial infarction) (Multi)       ASSESSMENT:   CAD status post PCI of the LAD postop day 1  Persistent A-fib  Diabetes  Hypertension  Dyslipidemia  Lower extremity lymphedema  Chronic venous insufficiency  Sleep apnea  EF 60%    PLAN:   -Okay to discharge home  -Aspirin 81 mg daily for 1 week  -Plavix 75 mg daily  -Xarelto 20 mg daily  -Lipitor 40 mg daily  -Lasix 20 mg p.o. every other day  -Avapro 150 mg daily  -Ranexa 500 mg p.o. twice daily  -Imdur 60 mg daily  -Follow up with TCM visit with Dr. Soto  in 2 weeks      40 minute visit    Darrick Whitney CNP  [facility]      Of note, this documentation is completed using the Dragon Dictation system (voice recognition software). There may be spelling and/or grammatical errors that were not corrected prior to final submission.    Please do not hesitate to call with questions.  Electronically signed by RYLAND Marques, on 3/18/2025 at 9:32 AM

## 2025-03-19 ENCOUNTER — PATIENT OUTREACH (OUTPATIENT)
Dept: CARDIOLOGY | Facility: CLINIC | Age: 80
End: 2025-03-19
Payer: MEDICARE

## 2025-03-19 NOTE — PROGRESS NOTES
Discharge Facility:  [facility]  Discharge Diagnosis:  NSTEMI  Admission Date:  3/16/25  Discharge Date:  3/18/25    PCP Appointment Date:  TBD  Specialist Appointment Date:   MAR 27  2025 10:00 AM - Cardiology Hospital Discharge  [facility] - Rafi Soto MD     Hospital Encounter and Summary Linked: Yes  ED to Hosp-Admission (Discharged) with Selvin Barton MD; Jonatan Seay MD (03/16/2025)   Discharge Summary by Kimberlee Palomares PA-C (03/18/2025 09:17)   See discharge assessment below for further details     Wrap Up  Wrap Up Additional Comments: Patient denies CP and states his SOB has improved. Reviewed new medications and patient has an understanding of indications. Reviewed upcoming appt with Dr Soto and encouraged an appt with PCP. Patient denies drainage or increased pain redness or swelling to groin area. This CM gives contact information for non urgent matters (3/19/2025  9:43 AM)    Engagement  Call Start Time: 0933 (3/19/2025  9:43 AM)    Medications  Prescription Comments: START taking:  clopidogrel (Plavix)   isosorbide mononitrate ER (Imdur)   Start taking on: March 19, 2025  nitroglycerin (Nitrostat) (3/19/2025  9:43 AM)    Appointments  Does the patient have a primary care provider?: Yes (3/19/2025  9:43 AM)  Care Management Interventions: Advised patient to make appointment (3/19/2025  9:43 AM)  Care Management Interventions: Advised patient to keep appointment (3/19/2025  9:43 AM)    Self Management  What is the home health agency?: n/a (3/19/2025  9:43 AM)  What Durable Medical Equipment (DME) was ordered?: n/a (3/19/2025  9:43 AM)    Patient Teaching  Does the patient have access to their discharge instructions?: Yes (3/19/2025  9:43 AM)  Care Management Interventions: Reviewed instructions with patient (3/19/2025  9:43 AM)  What is the patient's perception of their health status since discharge?: Improving (3/19/2025  9:43 AM)  Is the patient/caregiver able  to teach back the hierarchy of who to call/visit for symptoms/problems? PCP, Specialist, Home Health nurse, Urgent Care, ED, 911: Yes (3/19/2025  9:43 AM)

## 2025-03-25 LAB
ATRIAL RATE: 108 BPM
ATRIAL RATE: 43 BPM
ATRIAL RATE: 75 BPM
Q ONSET: 221 MS
Q ONSET: 222 MS
Q ONSET: 227 MS
QRS COUNT: 11 BEATS
QRS COUNT: 16 BEATS
QRS COUNT: 9 BEATS
QRS DURATION: 102 MS
QRS DURATION: 92 MS
QRS DURATION: 96 MS
QT INTERVAL: 388 MS
QT INTERVAL: 416 MS
QT INTERVAL: 446 MS
QTC CALCULATION(BAZETT): 411 MS
QTC CALCULATION(BAZETT): 492 MS
QTC CALCULATION(BAZETT): 495 MS
QTC FREDERICIA: 413 MS
QTC FREDERICIA: 455 MS
QTC FREDERICIA: 478 MS
R AXIS: -30 DEGREES
R AXIS: 11 DEGREES
R AXIS: 50 DEGREES
T AXIS: -43 DEGREES
T AXIS: -51 DEGREES
T AXIS: -73 DEGREES
T OFFSET: 416 MS
T OFFSET: 435 MS
T OFFSET: 444 MS
VENTRICULAR RATE: 59 BPM
VENTRICULAR RATE: 74 BPM
VENTRICULAR RATE: 97 BPM

## 2025-03-27 ENCOUNTER — APPOINTMENT (OUTPATIENT)
Dept: CARDIOLOGY | Facility: CLINIC | Age: 80
End: 2025-03-27
Payer: MEDICARE

## 2025-03-27 ENCOUNTER — OFFICE VISIT (OUTPATIENT)
Dept: CARDIOLOGY | Facility: CLINIC | Age: 80
End: 2025-03-27
Payer: MEDICARE

## 2025-03-27 VITALS
BODY MASS INDEX: 36.19 KG/M2 | SYSTOLIC BLOOD PRESSURE: 122 MMHG | HEIGHT: 70 IN | DIASTOLIC BLOOD PRESSURE: 78 MMHG | HEART RATE: 70 BPM | WEIGHT: 252.8 LBS

## 2025-03-27 DIAGNOSIS — E78.49 OTHER HYPERLIPIDEMIA: ICD-10-CM

## 2025-03-27 DIAGNOSIS — I25.119 ATHEROSCLEROSIS OF NATIVE CORONARY ARTERY OF NATIVE HEART WITH ANGINA PECTORIS: ICD-10-CM

## 2025-03-27 DIAGNOSIS — Z79.01 LONG TERM CURRENT USE OF ANTICOAGULANT THERAPY: ICD-10-CM

## 2025-03-27 DIAGNOSIS — I10 PRIMARY HYPERTENSION: ICD-10-CM

## 2025-03-27 DIAGNOSIS — I87.2 CHRONIC VENOUS INSUFFICIENCY: Primary | ICD-10-CM

## 2025-03-27 DIAGNOSIS — I48.21 PERMANENT ATRIAL FIBRILLATION (MULTI): ICD-10-CM

## 2025-03-27 DIAGNOSIS — I21.4 NSTEMI (NON-ST ELEVATED MYOCARDIAL INFARCTION) (MULTI): ICD-10-CM

## 2025-03-27 PROCEDURE — 3074F SYST BP LT 130 MM HG: CPT | Performed by: INTERNAL MEDICINE

## 2025-03-27 PROCEDURE — 1111F DSCHRG MED/CURRENT MED MERGE: CPT | Performed by: INTERNAL MEDICINE

## 2025-03-27 PROCEDURE — 1159F MED LIST DOCD IN RCRD: CPT | Performed by: INTERNAL MEDICINE

## 2025-03-27 PROCEDURE — 3078F DIAST BP <80 MM HG: CPT | Performed by: INTERNAL MEDICINE

## 2025-03-27 PROCEDURE — 99214 OFFICE O/P EST MOD 30 MIN: CPT | Performed by: INTERNAL MEDICINE

## 2025-03-27 RX ORDER — ALBUTEROL SULFATE 90 UG/1
2 INHALANT RESPIRATORY (INHALATION) EVERY 6 HOURS PRN
COMMUNITY
Start: 2025-03-11

## 2025-03-27 RX ORDER — CLOPIDOGREL BISULFATE 75 MG/1
75 TABLET ORAL DAILY
Qty: 90 TABLET | Refills: 3 | Status: SHIPPED | OUTPATIENT
Start: 2025-03-27 | End: 2026-03-27

## 2025-03-27 NOTE — PROGRESS NOTES
Patient:  Cesar Myles  YOB: 1945  MRN: [id number]       Chief Complaint:      Chief Complaint   Patient presents with    Post-Cath     Follow up after recent LT heart cath 3/17/25.        HPI:       Cesar Myles is a 79 y.o. male who returns today for cardiac follow-up after recent hospitalization at [facility] March 16, 2025. He has a history of atherosclerotic heart disease with prior angioplasty and drug-eluting stenting of the first obtuse marginal branch circumflex in 2010. He was also noted to have 50-60% stenosis of LAD. There was normal left ventricular systolic function. He has a history of permanent atrial fibrillation being managed with a rate control strategy. He is chronically anticoagulated with Xarelto. He has been asymptomatic with regards to his atrial fibrillation. He has a history of hypertension and hyperlipidemia. A myocardial perfusion study in June 2018 was negative for myocardial infarction or ischemia. LV ejection fraction was 59%. He achieved 3.9 METs. An echocardiogram showed normal LV systolic function along with mildly dilated right atrium and right ventricle.  There was mild impairment of RV systolic function. Estimated RVSP was 32 mm Hg. He has some chronic exertional chest discomfort and shortness of breath. He was placed on Ranexa with improvement of his symptoms. He has a history of obstructive sleep apnea for which he uses CPAP.     He underwent extensive evaluation and treatment by Dr. Gigi Bhardwaj for venous insufficiency, varicose veins, and lymphedema. He had ultrasound-guided foam sclerotherapy treatments. He is using a lymphedema pump regularly. He wears graduated compression stockings. His edema has significantly improved.      At the time of his visit in June 2024 he described some intermittent episodes of chest tightness.  The episodes lasted approximately 10 to 15 minutes.  A follow-up exercise myocardial perfusion study June 20, 2024 was negative for  ischemia or infarction.  Calculated LV ejection fraction of 68%.  Functional capacity was low and chronotropic response was significantly excessive suggesting deconditioning.     He follows regularly with neurology (Dr. Sridhar Tabor) for tremor and idiopathic progressive polyneuropathy.  He takes nortriptyline and primidone.  He was seen at urgent care November 30, 2024 for multiple symptoms consistent with an upper respiratory infection.  He was diagnosed with acute bronchitis and placed on doxycycline. He is on irbesartan for hypertension.  He and his wife are semiretired.  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      He was most recently seen by me in the office December 6, 2024 and was doing well.  He presented to the emergency department March 16, 2025 with complaints of intermittent chest discomfort which had been occurring over the past 2 weeks.  He noted the episodes were getting more frequent and more prolonged.  He had an episode that was associated with profuse diaphoresis.  He took sublingual nitroglycerin with some relief.  He decided to come to the hospital.  Upon arrival to emergency department his vital signs were stable with exception of blood pressure 184/94 mmHg.  Oxygen saturation 94%.  CBC and CMP normal with exception of glucose 156.  BNP was 100.  High-sensitivity troponin level 123 and 129.  Chest x-ray did not show any active disease.  EKG showed atrial fibrillation with nonspecific ST-T wave changes.  Repeat EKG shows atrial fibrillation with new anterolateral ST-T wave changes.  He was admitted to telemetry.  He was continued on aspirin, metoprolol, ranolazine, angiotensin receptor blocker, and atorvastatin.     Cardiac catheterization on March 17, 2025 by Dr. Membreno showed 95% stenosis of the mid LAD, 30% stenosis of the circumflex, and 30% stenosis of the PDA of the circumflex.  No significant disease involving the RCA.  He underwent angioplasty and stenting of the mid  LAD.  Echocardiogram showed an estimated LV ejection fraction of 60%.  Moderate enlargement of the right ventricle with moderate impairment of RV systolic function.  There was mild mitral regurgitation and moderate to severe tricuspid regurgitation.  CBC and CMP were normal.  High-sensitivity troponin levels 192 and 272.  .  He does have a history of obstructive sleep apnea.  He was discharged home March 18, 2025 on Plavix, Xarelto, Lipitor, Lasix 20 g over the day, Avapro 150 mg daily, Ranexa 500 mg twice daily, and Imdur 60 mg daily.  He was advised to take aspirin for 1 week.    He has been doing well since his recent discharge.  He denies any recurrent cardiovascular symptoms.  He denies any chest pain or shortness of breath.  He denies any orthopnea, PND, or increasing peripheral edema.  He denies any palpitations, lightheadedness, near-syncope, or syncope.  He denies any fever, chills, or cough.  He denies any nausea, vomiting, or diaphoresis.  He denies any hemoptysis, hematemesis, melena, or hematochezia.  He is currently free of any anginal or CHF symptoms.  Continue anticoagulation with Xarelto.  He will continue on clopidogrel.  He discontinued aspirin.  He is having some headaches and I advised him to discontinue isosorbide mononitrate.  He will continue on Ranexa.  Other details as noted below.      Objective:     Vitals:    03/27/25 1022   BP: 122/78   Pulse: 70       Wt Readings from Last 4 Encounters:   03/27/25 115 kg (252 lb 12.8 oz)   03/16/25 116 kg (255 lb)   12/11/24 115 kg (254 lb)   12/06/24 114 kg (252 lb 6.4 oz)       Allergies:     No Known Allergies       Medications:     Current Outpatient Medications   Medication Instructions    albuterol 90 mcg/actuation inhaler 2 puffs, Every 6 hours PRN    ascorbic acid (VITAMIN C) 500 mg, Daily    aspirin 81 mg, oral, Daily, X 1 week only, then STOP.    atorvastatin (LIPITOR) 40 mg, oral, Daily    biotin 10 mg tablet 3 tablets, Daily     cholecalciferol (Vitamin D-3) 5,000 Units tablet 1 tablet, Daily    clopidogrel (PLAVIX) 75 mg, oral, Daily    finasteride (Proscar) 5 mg tablet 0.5 tablets, Daily    furosemide (LASIX) 20 mg, oral, Every other day    irbesartan (AVAPRO) 150 mg, oral, Daily    isosorbide mononitrate ER (IMDUR) 60 mg, oral, Daily, Do not crush or chew.    metFORMIN (GLUCOPHAGE) 500 mg, oral, 2 times daily (morning and late afternoon)    metoprolol succinate XL (TOPROL-XL) 50 mg, oral, Daily    multivitamin tablet 1 tablet, Daily    nitroglycerin (NITROSTAT) 0.4 mg, sublingual, Every 5 min PRN, May repeat every 5 minutes for up to 3 doses.    nortriptyline (PAMELOR) 20 mg    omega-3 fatty acids (FISH OIL CONCENTRATE ORAL) Fish Oil OIL take 1 cap daily Quantity: 0 Refills: 0 Ordered: 28-Oct-2021 DO Active    potassium chloride CR (Klor-Con M10) 10 mEq ER tablet 10 mEq, oral, Daily    primidone (Mysoline) 50 mg tablet 1 tablet, 3 times daily    ranolazine (RANEXA) 500 mg, oral, 2 times daily    rivaroxaban (XARELTO) 20 mg, oral, Daily with evening meal, Take with food.    terazosin (HYTRIN) 5 mg, oral, Daily       Physical Examination:   GENERAL:  Well developed, well nourished, in no acute distress.  CHEST:  Symmetric and nontender.  NEURO/PSYCH:  Alert and oriented times three with approppriate behavior and responses.  NECK:  Supple, no JVD, no bruit.  LUNGS:  Clear to auscultation bilaterally, normal respiratory effort.  HEART:  Rate and rhythm regular with no evident murmur, no gallop appreciated.        There are no rubs, clicks or heaves.  EXTREMITIES:  Warm with good color, no clubbing or cyanosis.  There is no edema noted.  PERIPHERAL VASCULAR:  Pulses present and equally palpable; 2+ throughout.      Lab:     CBC:   Lab Results   Component Value Date    WBC 8.4 03/18/2025    RBC 4.42 (L) 03/18/2025    HGB 14.8 03/18/2025    HCT 42.6 03/18/2025     (L) 03/18/2025        CMP:    Lab Results   Component Value Date      03/18/2025    K 4.2 03/18/2025     03/18/2025    CO2 27 03/18/2025    BUN 15 03/18/2025    CREATININE 1.27 03/18/2025    GLUCOSE 144 (H) 03/18/2025    CALCIUM 8.8 03/18/2025       Lipid Profile:    Lab Results   Component Value Date    TRIG 117 03/17/2025    HDL 36.4 03/17/2025    LDLCALC 71 03/17/2025       BMP:  Lab Results   Component Value Date     03/18/2025     03/17/2025     03/16/2025    K 4.2 03/18/2025    K 4.2 03/17/2025    K 4.4 03/16/2025     03/18/2025     03/17/2025     03/16/2025    CO2 27 03/18/2025    CO2 30 03/17/2025    CO2 27 03/16/2025    BUN 15 03/18/2025    BUN 13 03/17/2025    BUN 15 03/16/2025    CREATININE 1.27 03/18/2025    CREATININE 1.05 03/17/2025    CREATININE 1.06 03/16/2025       CBC:  Lab Results   Component Value Date    WBC 8.4 03/18/2025    WBC 6.1 03/17/2025    WBC 5.8 03/16/2025    RBC 4.42 (L) 03/18/2025    RBC 4.59 03/17/2025    RBC 4.58 03/16/2025    HGB 14.8 03/18/2025    HGB 15.2 03/17/2025    HGB 15.4 03/16/2025    HCT 42.6 03/18/2025    HCT 44.2 03/17/2025    HCT 43.8 03/16/2025    MCV 96 03/18/2025    MCV 96 03/17/2025    MCV 96 03/16/2025    MCH 33.5 03/18/2025    MCH 33.1 03/17/2025    MCH 33.6 03/16/2025    MCHC 34.7 03/18/2025    MCHC 34.4 03/17/2025    MCHC 35.2 03/16/2025    RDW 12.9 03/18/2025    RDW 12.9 03/17/2025    RDW 12.5 03/16/2025     (L) 03/18/2025     (L) 03/17/2025     (L) 03/16/2025       Hepatic Function Panel:    Lab Results   Component Value Date    ALKPHOS 66 03/16/2025    ALT 33 03/16/2025    AST 25 03/16/2025    PROT 7.1 03/16/2025    BILITOT 0.8 03/16/2025       HgBA1c:    Lab Results   Component Value Date    HGBA1C 6.9 (H) 03/16/2025       Magnesium:    Lab Results   Component Value Date    MG 1.95 03/18/2025       BNP:   Lab Results   Component Value Date     (H) 03/16/2025        PT/INR:    Lab Results   Component Value Date    PROTIME 12.8 (H) 03/16/2025    INR 1.2 (H)  03/16/2025       Cardiac Enzymes:    Lab Results   Component Value Date    TROPHS 272 (HH) 03/17/2025    TROPHS 192 (HH) 03/16/2025    TROPHS 129 (HH) 03/16/2025         Diagnostic Studies:     Electrocardiogram 12 Lead    Result Date: 3/25/2025  Atrial fibrillation Anterior infarct , age undetermined ST & T wave abnormality, consider lateral ischemia Abnormal ECG When compared with ECG of 17-MAR-2025 06:37, (unconfirmed) Nonspecific T wave abnormality has replaced inverted T waves in Inferior leads T wave inversion no longer evident in Anterior leads Confirmed by Jonatan Knox ([id number]) on 3/25/2025 8:28:32 AM    ECG 12 Lead    Result Date: 3/25/2025  Atrial fibrillation ST & T wave abnormality, consider inferior ischemia ST & T wave abnormality, consider anterolateral ischemia Abnormal ECG When compared with ECG of 16-MAR-2025 14:06, ST now depressed in Anterior leads T wave inversion now evident in Inferior leads T wave inversion now evident in Anterolateral leads QT has lengthened Confirmed by Jonatan Knox ([id number]) on 3/25/2025 8:24:14 AM    Cardiac Catheterization Procedure    Result Date: 3/17/2025   [facility], Cath Lab        [address] [place] [address] Cardiovascular Catheterization Report Patient Name:      KOBY NESS     Performing Physician:  Rabia Sebastian MD Study Date:        3/17/2025            Verifying Physician:   Rabia Sebastian MD MRN/PID:           [id number]             Cardiologist/Co-Scrub: Accession#:        [id number]         Ordering Provider:     [id number] LEXI KIMBALL Date of Birth/Age: 1945 / 79 years Cardiologist: Gender:            M                    Fellow: Encounter#:        [id number]           Surgeon:  Study:            Coronary  Arteriogram Additional Study: Left Heart Cath Additional Study: PCI - Percutaneous Coronary Intervention  Indications: KOBY NESS is a 80 year old male who presents with dyslipidemia, hypertension, prior percutaneous coronary intervention, Former Smoker, atrial fibrillation, diabetes, coronary artery disease and a chest pain assessment of typical angina. NSTE - ACS. Stress test performed: No. CTA performed: NoLorna To accessed: No. LVEF Assessed: No. Cardiac arrest: No. Cardiac surgical consult: No. Cardiovascular Instability: No Frailty status of patient entering lab: 5 = Mildly frail.  Procedure Description: After infiltration with 2% Lidocaine, the right femoral artery was cannulated with a modified Seldinger technique. Subsequently a 5 [place] sheath was placed in the right femoral artery. The arterial sheath was sized up to 6 [place]an. Selective coronary catheterization was performed using a 5 Fr catheter(s) exchanged over a guide wire to cannulate the coronary arteries. A JL 4 tip catheter was used for left coronary injections. A 3DRC tip catheter was used for right coronary injections. Multiple injections of contrast were made into the left and right coronary arteries with angiograms recorded in multiple projections. Retrograde left heart catheterizion was accomplished with a 5 Fr. pigtail catheter. A single plane left ventriculogram was recorded in the 30 degree ENCARNACION projection. The contrast dose was 20 ml injected at 10 ml/sec. The catheter was then withdrawn across the aortic valve under continuous pressure monitoring and removed. After completion of the procedure, femoral artery angiography was performed. This demonstrated a common femoral artery puncture appropriate for closure. A Vascade 6/7Fr vascular closure Device was placed per protocol.  Coronary Angiography: The coronary circulation is left dominant.  Left Main Coronary Artery: There is 0% stenosis in the entire left main coronary artery.   Left Anterior Descending Coronary Artery Distribution: There is 40% stenosis in the proximal left anterior descending artery. Contains patent previously placed stents. There is 95% stenosis in the mid left anterior descending artery. The devices advanced to the mid LAD lesion were: a balloon was inflated for pre-dilation, Resolute [place] 3.00x18 stent was deployed in the lesion a balloon was inflated for post-dilation. Residual stenosis is 0 %.  Circumflex Coronary Artery Distribution: There is 30% stenosis in the the OM 2 Circumflex artery. There is 30% stenosis in the PDA Circumflex artery.  Right Coronary Artery Distribution: The Right Coronary Artery is a small, non-dominant vessel. There is 0stenosis in the the entire Right Coronary Artery.  Coronary Interventions: Angiography reveals a 95% stenosis of the mid left anterior descending coronary artery. Pre-intervention ZEINAB flow was 3. Percutaneous coronary intervention was performed within the mid left anterior descending. The stenosis was successfully reduced from 95% to 0%. Post-intervention ZEINAB flow was 3.  Hemo Personnel: +---------------------------+---------+ Name                       Duty      +---------------------------+---------+ Joaquim Membreno MD 1 +---------------------------+---------+  Hemodynamic Pressures:  +----+---------------+----------+-------------+--------------+-------+---------+ Site   Date Time   Phase NameSystolic mmHgDiastolic mmHgED mmHgMean mmHg +----+---------------+----------+-------------+--------------+-------+---------+   AO      3/17/2025  AIR REST          122            70              87         12:09:25 PM                                                      +----+---------------+----------+-------------+--------------+-------+---------+   AO      3/17/2025  AIR REST           98            71              84         12:12:18 PM                                                       +----+---------------+----------+-------------+--------------+-------+---------+   LV      3/17/2025  AIR REST           85             0     11                  12:17:49 PM                                                      +----+---------------+----------+-------------+--------------+-------+---------+   LV      3/17/2025  AIR REST           88            11     21                  12:17:54 PM                                                      +----+---------------+----------+-------------+--------------+-------+---------+   LV      3/17/2025  AIR REST          107             4     23                  12:18:22 PM                                                      +----+---------------+----------+-------------+--------------+-------+---------+   AO      3/17/2025  AIR REST          103            65              81         12:28:42 PM                                                      +----+---------------+----------+-------------+--------------+-------+---------+  Cardiac Cath Post Procedure Notes: Post Procedure Diagnosis: MARTHA of LAD. Blood Loss:               Estimated blood loss during the procedure was 0 mls. Specimens Removed:        Number of specimen(s) removed: none. ____________________________________________________________________________________ CONCLUSIONS:  1. The entire Left Main: 0% stenosis.  2. Left Anterior Descending Artery: contains patent previously placed stents.  3. Proximal LAD Lesion: The percent stenosis is 40%.  4. Mid LAD Lesion: The percent stenosis is 95%.  5. Mid LAD Lesion: pre-dilation, Resolute [place] 3.00x18 post-dilation: 0% residual stenosis.  6. OM 2 CX Lesion: The percent stenosis is 30%.  7. PDA Cx Lesion: The percent stenosis is 30%.  8. The entire RCA Lesion: The percent stenosis is 0%. ICD 10 Codes: Non ST elevation (NSTEMI) myocardial infarction-I21.4  CPT Codes: Left Heart Cath  (visualization of coronaries) and LV-57277; Moderate Sedation Services initial 15 minutes patient >5 years-37504; Stent w angioplasty Left Anterior Descending single major Artery branch (PCI)-05455.LD  [id number] Joaquim Sebastian MD Performing Physician Electronically signed by [id number] Joaquim Sebastian MD on 3/17/2025 at 1:43:17 PM  ** Final **     ECG 12 lead    Result Date: 3/16/2025  Atrial fibrillation Septal infarct (cited on or before 16-MAR-2025) Abnormal ECG When compared with ECG of 16-MAR-2025 11:13, (unconfirmed) No significant change was found See ED provider note for full interpretation and clinical correlation Confirmed by Lory Oh ([id number]) on 3/16/2025 2:20:04 PM    ECG 12 lead    Result Date: 3/16/2025  Atrial fibrillation Anteroseptal infarct , age undetermined Abnormal ECG When compared with ECG of 19-FEB-2010 06:32, Atrial fibrillation has replaced Sinus rhythm Anteroseptal infarct is now Present ST no longer elevated in Lateral leads Nonspecific T wave abnormality, worse in Anterior leads See ED provider note for full interpretation and clinical correlation Confirmed by Lory Oh ([id number]) on 3/16/2025 2:19:55 PM    XR chest 1 view    Result Date: 3/16/2025  Interpreted By:  Cj Castillo, STUDY: XR CHEST 1 VIEW; 3/16/2025 1:19 pm   INDICATION: Signs/Symptoms:Chest Pain   COMPARISON: None.   ACCESSION NUMBER(S): [id number]   ORDERING CLINICIAN: KEELY BERRIOS   TECHNIQUE: Single frontal view of the chest performed.   FINDINGS: LINES AND DEVICES: None.   LUNGS: No focal consolidation, pulmonary edema, pleural effusion or pneumothorax.   CARDIOMEDIASTINAL SILHOUETTE: Mild cardiomegaly.   OTHER: No obvious displaced rib fracture is identified as imaged.       No acute pulmonary process.   MACRO None   Signed by: Cj Castillo 3/16/2025 1:40 PM       Problem List:     Patient Active Problem List   Diagnosis    Atherosclerosis of native coronary artery of native heart with angina  pectoris    Permanent atrial fibrillation (Multi)    Long term current use of anticoagulant therapy    Primary hypertension    Hyperlipidemia    Obstructive sleep apnea    Chronic venous insufficiency    Lymphedema    Morbid obesity (Multi)    Idiopathic chronic venous hypertension of left leg with inflammation    Idiopathic chronic venous hypertension of right leg with inflammation    Left chronic serous otitis media    Mixed conductive and sensorineural hearing loss of left ear with restricted hearing of right ear    Sensorineural hearing loss (SNHL) of right ear with restricted hearing of left ear    Otorrhea of left ear    Idiopathic polyneuropathy    NSTEMI (non-ST elevated myocardial infarction) (Multi)       Asessment:     Problem List Items Addressed This Visit             ICD-10-CM    Atherosclerosis of native coronary artery of native heart with angina pectoris I25.119    Relevant Medications    clopidogrel (Plavix) 75 mg tablet    Other Relevant Orders    Follow Up In Cardiology    Comprehensive Metabolic Panel    CBC    Lipid Panel    Permanent atrial fibrillation (Multi) I48.21    Relevant Medications    clopidogrel (Plavix) 75 mg tablet    Long term current use of anticoagulant therapy Z79.01    Primary hypertension I10    Hyperlipidemia E78.5    Chronic venous insufficiency - Primary I87.2    NSTEMI (non-ST elevated myocardial infarction) (Multi) I21.4    Relevant Medications    clopidogrel (Plavix) 75 mg tablet    Other Relevant Orders    Follow Up In Cardiology    Comprehensive Metabolic Panel    CBC    Lipid Panel     I, Kacey Messer CMA   am scribing for, and in the presence of Dr. Saad Soto MD, FACC.    I, Dr. Saad Soto MD, FACC, personally performed the services described in the documentation as scribed by Kacey Messer CMA   in my presence, and confirm it is both accurate and complete.      Provider Attestation - Scribe documentation    The above portion of this note was  dictated by me using voice recognition software.  All medical record entries made by the scribe were at my direction.  The scribe entering the documentation was in my presence.   I have reviewed the chart and agree that the record accurately reflects my personal performance of the history, physical exam, discussion and plan.

## 2025-03-27 NOTE — PATIENT INSTRUCTIONS
STOP:  ISOSORBIDE     FASTING LABS, FASTING FROM MIDNIGHT THE NIGHT BEFORE TO BE DONE 4-7 DAYS PRIOR TO YOUR APPOINTMENT WITH DR RODRIGUEZ IN 6 MONTHS    Please bring all medicines, vitamins, and herbal supplements with you in original bottles to every appointment! This is the best way to ensure your medication list in your chart is accurate.    Prescriptions will not be filled unless you are compliant with your follow up appointments or have a follow up appointment scheduled as per instruction of your physician. Refills should be requested at the time of your visit.    DID YOU KNOW  We have a pharmacy here in the [facility].  They can fill all prescriptions, not just cardiac medications.  Prescriptions from other pharmacies can easily be transferred to the  pharmacy by the  pharmacist on site.   pharmacies offer FREE HOME DELIVERY on medications to anywhere in [place]. They can sync your medications. Typically prescriptions can be ready in 10 - 15 minutes. If pharmacy is unable to fill your  prescription or if cost is more than your paying now the Pharmacist can easily transfer back to your Pharmacy of choice. Pharmacy phone # [phone].

## 2025-04-02 ENCOUNTER — PATIENT OUTREACH (OUTPATIENT)
Dept: CARDIOLOGY | Facility: CLINIC | Age: 80
End: 2025-04-02
Payer: MEDICARE

## 2025-04-30 ENCOUNTER — PATIENT OUTREACH (OUTPATIENT)
Dept: CARDIOLOGY | Facility: CLINIC | Age: 80
End: 2025-04-30
Payer: MEDICARE

## 2025-06-12 ENCOUNTER — APPOINTMENT (OUTPATIENT)
Dept: CARDIOLOGY | Facility: CLINIC | Age: 80
End: 2025-06-12
Payer: MEDICARE

## 2025-06-12 VITALS
BODY MASS INDEX: 35.84 KG/M2 | DIASTOLIC BLOOD PRESSURE: 88 MMHG | SYSTOLIC BLOOD PRESSURE: 138 MMHG | HEART RATE: 54 BPM | WEIGHT: 249.8 LBS

## 2025-06-12 DIAGNOSIS — G60.9 IDIOPATHIC POLYNEUROPATHY: ICD-10-CM

## 2025-06-12 DIAGNOSIS — Z79.01 LONG TERM CURRENT USE OF ANTICOAGULANT THERAPY: ICD-10-CM

## 2025-06-12 DIAGNOSIS — E78.2 MIXED HYPERLIPIDEMIA: ICD-10-CM

## 2025-06-12 DIAGNOSIS — R07.9 CHEST PAIN, UNSPECIFIED TYPE: ICD-10-CM

## 2025-06-12 DIAGNOSIS — I10 PRIMARY HYPERTENSION: ICD-10-CM

## 2025-06-12 DIAGNOSIS — I89.0 LYMPHEDEMA: ICD-10-CM

## 2025-06-12 DIAGNOSIS — I87.321 IDIOPATHIC CHRONIC VENOUS HYPERTENSION OF RIGHT LEG WITH INFLAMMATION: ICD-10-CM

## 2025-06-12 DIAGNOSIS — I87.322 IDIOPATHIC CHRONIC VENOUS HYPERTENSION OF LEFT LEG WITH INFLAMMATION: ICD-10-CM

## 2025-06-12 DIAGNOSIS — I48.21 PERMANENT ATRIAL FIBRILLATION (MULTI): ICD-10-CM

## 2025-06-12 PROCEDURE — 3079F DIAST BP 80-89 MM HG: CPT | Performed by: INTERNAL MEDICINE

## 2025-06-12 PROCEDURE — 1159F MED LIST DOCD IN RCRD: CPT | Performed by: INTERNAL MEDICINE

## 2025-06-12 PROCEDURE — 99214 OFFICE O/P EST MOD 30 MIN: CPT | Performed by: INTERNAL MEDICINE

## 2025-06-12 PROCEDURE — 1036F TOBACCO NON-USER: CPT | Performed by: INTERNAL MEDICINE

## 2025-06-12 PROCEDURE — 3075F SYST BP GE 130 - 139MM HG: CPT | Performed by: INTERNAL MEDICINE

## 2025-06-12 NOTE — PROGRESS NOTES
Patient:  Cesar Myles  YOB: 1945  MRN: [id number]       Chief Complaint:      Chief Complaint   Patient presents with    Follow-up     6 month follow up for afib, HTN.        HPI:       Cesar Myles is a 79 y.o. male who returns today for cardiac follow-up after recent hospitalization at [facility] March 16, 2025.  He has a history of atherosclerotic heart disease with prior angioplasty and drug-eluting stenting of the first obtuse marginal branch circumflex in 2010. He was also noted to have 50-60% stenosis of LAD. There was normal left ventricular systolic function. He has a history of permanent atrial fibrillation being managed with a rate control strategy. He is chronically anticoagulated with Xarelto. He has been asymptomatic with regards to his atrial fibrillation. He has a history of hypertension and hyperlipidemia. A myocardial perfusion study in June 2018 was negative for myocardial infarction or ischemia. LV ejection fraction was 59%. He achieved 3.9 METs. An echocardiogram showed normal LV systolic function along with mildly dilated right atrium and right ventricle.  There was mild impairment of RV systolic function. Estimated RVSP was 32 mm Hg. He has some chronic exertional chest discomfort and shortness of breath. He was placed on Ranexa with improvement of his symptoms. He has a history of obstructive sleep apnea for which he uses CPAP.     He underwent extensive evaluation and treatment by Dr. Gigi Bhardwaj for venous insufficiency, varicose veins, and lymphedema. He had ultrasound-guided foam sclerotherapy treatments. He is using a lymphedema pump regularly. He wears graduated compression stockings. His edema has significantly improved.      At the time of his visit in June 2024 he described some intermittent episodes of chest tightness.  The episodes lasted approximately 10 to 15 minutes.  A follow-up exercise myocardial perfusion study June 20, 2024 was negative for ischemia or  infarction.  Calculated LV ejection fraction of 68%.  Functional capacity was low and chronotropic response was significantly excessive suggesting deconditioning.     He follows regularly with neurology (Dr. Sridhar Tabor) for tremor and idiopathic progressive polyneuropathy.  He takes nortriptyline and primidone.  He was seen at urgent care November 30, 2024 for multiple symptoms consistent with an upper respiratory infection.  He was diagnosed with acute bronchitis and placed on doxycycline. He is on irbesartan for hypertension.      He presented to the emergency department March 16, 2025 with complaints of intermittent chest discomfort which had been occurring over the previous 2 weeks.  He noted the episodes were getting more frequent and more prolonged.  He had an episode that was associated with profuse diaphoresis.  He took sublingual nitroglycerin with some relief.  He decided to come to the hospital.  Upon arrival to emergency department his vital signs were stable with exception of blood pressure 184/94 mmHg.  Oxygen saturation 94%.  CBC and CMP normal with exception of glucose 156.  BNP was 100.  High-sensitivity troponin level 123 and 129.  Chest x-ray did not show any active disease.  EKG showed atrial fibrillation with nonspecific ST-T wave changes.  Repeat EKG shows atrial fibrillation with new anterolateral ST-T wave changes.  He was admitted to telemetry.  He was continued on aspirin, metoprolol, ranolazine, angiotensin receptor blocker, and atorvastatin.     Cardiac catheterization on March 17, 2025 by Dr. Membreno showed 95% stenosis of the mid LAD, 30% stenosis of the circumflex, and 30% stenosis of the PDA of the circumflex.  No significant disease involving the RCA.  He underwent angioplasty and stenting of the mid LAD.  Echocardiogram showed an estimated LV ejection fraction of 60%.  Moderate enlargement of the right ventricle with moderate impairment of RV systolic function.  There was  mild mitral regurgitation and moderate to severe tricuspid regurgitation.  CBC and CMP were normal.  High-sensitivity troponin levels 192 and 272.  .  He was discharged home March 18, 2025 on Plavix, Xarelto, Lipitor, Lasix 20 g over the day, Avapro 150 mg daily, Ranexa 500 mg twice daily, and Imdur 60 mg daily.  He was advised to discontinue aspirin after 1 week and continue on Xarelto and Plavix.  Imdur was discontinued.      He continues to do well since his last visit.  He is active with many home projects.  He denies any recurrent cardiovascular symptoms.  He denies any chest pain or shortness of breath.  He denies any orthopnea, PND, or increasing peripheral edema.  He denies any palpitations, lightheadedness, near-syncope, or syncope.  He denies any fever, chills, or cough.  He denies any nausea, vomiting, or diaphoresis.  He denies any hemoptysis, hematemesis, melena, or hematochezia.  He is currently free of any anginal or CHF symptoms.  Continue anticoagulation with Xarelto.  He will continue on clopidogrel.  He will continue on Ranexa.  Other details as noted below.        Objective:     Vitals:    06/12/25 1042   BP: 138/88   Pulse: 54       Wt Readings from Last 4 Encounters:   03/27/25 115 kg (252 lb 12.8 oz)   03/16/25 116 kg (255 lb)   12/11/24 115 kg (254 lb)   12/06/24 114 kg (252 lb 6.4 oz)       Allergies:     No Known Allergies       Medications:     Current Outpatient Medications   Medication Instructions    albuterol 90 mcg/actuation inhaler 2 puffs, Every 6 hours PRN    ascorbic acid (VITAMIN C) 500 mg, Daily    atorvastatin (LIPITOR) 40 mg, oral, Daily    biotin 10 mg tablet 3 tablets, Daily    cholecalciferol (Vitamin D-3) 5,000 Units tablet 1 tablet, Daily    clopidogrel (PLAVIX) 75 mg, oral, Daily    finasteride (Proscar) 5 mg tablet 0.5 tablets, Daily    furosemide (LASIX) 20 mg, oral, Every other day    irbesartan (AVAPRO) 150 mg, oral, Daily    metFORMIN (GLUCOPHAGE) 500 mg,  oral, 2 times daily (morning and late afternoon)    metoprolol succinate XL (TOPROL-XL) 50 mg, oral, Daily    multivitamin tablet 1 tablet, Daily    nitroglycerin (NITROSTAT) 0.4 mg, sublingual, Every 5 min PRN, May repeat every 5 minutes for up to 3 doses.    nortriptyline (PAMELOR) 20 mg    omega-3 fatty acids (FISH OIL CONCENTRATE ORAL) Fish Oil OIL take 1 cap daily Quantity: 0 Refills: 0 Ordered: 28-Oct-2021 DO Active    potassium chloride CR (Klor-Con M10) 10 mEq ER tablet 10 mEq, oral, Daily    primidone (Mysoline) 50 mg tablet 1 tablet, 3 times daily    ranolazine (RANEXA) 500 mg, oral, 2 times daily    rivaroxaban (XARELTO) 20 mg, oral, Daily with evening meal, Take with food.    terazosin (HYTRIN) 5 mg, oral, Daily       Physical Examination:   GENERAL:  Well developed, well nourished, in no acute distress.  CHEST:  Symmetric and nontender.  NEURO/PSYCH:  Alert and oriented times three with approppriate behavior and responses.  NECK:  Supple, no JVD, no bruit.  LUNGS:  Clear to auscultation bilaterally, normal respiratory effort.  HEART:  Rate and rhythm regular with no evident murmur, no gallop appreciated.        There are no rubs, clicks or heaves.  EXTREMITIES:  Warm with good color, no clubbing or cyanosis.  There is no edema noted.  PERIPHERAL VASCULAR:  Pulses present and equally palpable; 2+ throughout.      Lab:     CBC:   Lab Results   Component Value Date    WBC 8.4 03/18/2025    RBC 4.42 (L) 03/18/2025    HGB 14.8 03/18/2025    HCT 42.6 03/18/2025     (L) 03/18/2025        CMP:    Lab Results   Component Value Date     03/18/2025    K 4.2 03/18/2025     03/18/2025    CO2 27 03/18/2025    BUN 15 03/18/2025    CREATININE 1.27 03/18/2025    GLUCOSE 144 (H) 03/18/2025    CALCIUM 8.8 03/18/2025       Lipid Profile:    Lab Results   Component Value Date    TRIG 117 03/17/2025    HDL 36.4 03/17/2025    LDLCALC 71 03/17/2025       BMP:  Lab Results   Component Value Date      03/18/2025     03/17/2025     03/16/2025    K 4.2 03/18/2025    K 4.2 03/17/2025    K 4.4 03/16/2025     03/18/2025     03/17/2025     03/16/2025    CO2 27 03/18/2025    CO2 30 03/17/2025    CO2 27 03/16/2025    BUN 15 03/18/2025    BUN 13 03/17/2025    BUN 15 03/16/2025    CREATININE 1.27 03/18/2025    CREATININE 1.05 03/17/2025    CREATININE 1.06 03/16/2025       CBC:  Lab Results   Component Value Date    WBC 8.4 03/18/2025    WBC 6.1 03/17/2025    WBC 5.8 03/16/2025    RBC 4.42 (L) 03/18/2025    RBC 4.59 03/17/2025    RBC 4.58 03/16/2025    HGB 14.8 03/18/2025    HGB 15.2 03/17/2025    HGB 15.4 03/16/2025    HCT 42.6 03/18/2025    HCT 44.2 03/17/2025    HCT 43.8 03/16/2025    MCV 96 03/18/2025    MCV 96 03/17/2025    MCV 96 03/16/2025    MCH 33.5 03/18/2025    MCH 33.1 03/17/2025    MCH 33.6 03/16/2025    MCHC 34.7 03/18/2025    MCHC 34.4 03/17/2025    MCHC 35.2 03/16/2025    RDW 12.9 03/18/2025    RDW 12.9 03/17/2025    RDW 12.5 03/16/2025     (L) 03/18/2025     (L) 03/17/2025     (L) 03/16/2025       Hepatic Function Panel:    Lab Results   Component Value Date    ALKPHOS 66 03/16/2025    ALT 33 03/16/2025    AST 25 03/16/2025    PROT 7.1 03/16/2025    BILITOT 0.8 03/16/2025       HgBA1c:    Lab Results   Component Value Date    HGBA1C 6.9 (H) 03/16/2025       Magnesium:    Lab Results   Component Value Date    MG 1.95 03/18/2025       BNP:   Lab Results   Component Value Date     (H) 03/16/2025        PT/INR:    Lab Results   Component Value Date    PROTIME 12.8 (H) 03/16/2025    INR 1.2 (H) 03/16/2025       Cardiac Enzymes:    Lab Results   Component Value Date    TROPHS 272 (HH) 03/17/2025    TROPHS 192 (HH) 03/16/2025    TROPHS 129 (HH) 03/16/2025         Diagnostic Studies:     Electrocardiogram 12 Lead    Result Date: 3/25/2025  Atrial fibrillation Anterior infarct , age undetermined ST & T wave abnormality, consider lateral ischemia Abnormal ECG  When compared with ECG of 17-MAR-2025 06:37, (unconfirmed) Nonspecific T wave abnormality has replaced inverted T waves in Inferior leads T wave inversion no longer evident in Anterior leads Confirmed by Jonatan Knox ([id number]) on 3/25/2025 8:28:32 AM    ECG 12 Lead    Result Date: 3/25/2025  Atrial fibrillation ST & T wave abnormality, consider inferior ischemia ST & T wave abnormality, consider anterolateral ischemia Abnormal ECG When compared with ECG of 16-MAR-2025 14:06, ST now depressed in Anterior leads T wave inversion now evident in Inferior leads T wave inversion now evident in Anterolateral leads QT has lengthened Confirmed by Jonatan Knox ([id number]) on 3/25/2025 8:24:14 AM      Cardiac Catheterization Procedure  Result Date: 3/17/2025  [facility], Cath Lab        [address] [place] [address] Cardiovascular Catheterization Report    CONCLUSIONS:  1. The entire Left Main: 0% stenosis.  2. Left Anterior Descending Artery: contains patent previously placed stents.  3. Proximal LAD Lesion: The percent stenosis is 40%.  4. Mid LAD Lesion: The percent stenosis is 95%.  5. Mid LAD Lesion: pre-dilation, Resolute [place] 3.00x18 post-dilation: 0% residual stenosis.  6. OM 2 CX Lesion: The percent stenosis is 30%.  7. PDA Cx Lesion: The percent stenosis is 30%.  8. The entire RCA Lesion: The percent stenosis is 0%. ICD 10 Codes: Non ST elevation (NSTEMI) myocardial infarction-I21.4  CPT Codes: Left Heart Cath (visualization of coronaries) and LV-80352; Moderate Sedation Services initial 15 minutes patient >5 years-37595; Stent w angioplasty Left Anterior Descending single major Artery branch (PCI)-50547.LD  [id number] Joaquim Sebastian MD Performing Physician Electronically signed by [id number] Joaquim Sebastian MD on 3/17/2025 at 1:43:17 PM      ECG 12 lead    Result Date: 3/16/2025  Atrial fibrillation Septal infarct (cited on or before 16-MAR-2025) Abnormal ECG When compared with ECG of  16-MAR-2025 11:13, (unconfirmed) No significant change was found See ED provider note for full interpretation and clinical correlation Confirmed by Lory Oh ([id number]) on 3/16/2025 2:20:04 PM    ECG 12 lead    Result Date: 3/16/2025  Atrial fibrillation Anteroseptal infarct , age undetermined Abnormal ECG When compared with ECG of 19-FEB-2010 06:32, Atrial fibrillation has replaced Sinus rhythm Anteroseptal infarct is now Present ST no longer elevated in Lateral leads Nonspecific T wave abnormality, worse in Anterior leads See ED provider note for full interpretation and clinical correlation Confirmed by Lory Oh ([id number]) on 3/16/2025 2:19:55 PM    XR chest 1 view    Result Date: 3/16/2025  Interpreted By:  Cj Castillo, STUDY: XR CHEST 1 VIEW; 3/16/2025 1:19 pm   INDICATION: Signs/Symptoms:Chest Pain   COMPARISON: None.   ACCESSION NUMBER(S): [id number]   ORDERING CLINICIAN: KEELY BERRIOS   TECHNIQUE: Single frontal view of the chest performed.   FINDINGS: LINES AND DEVICES: None.   LUNGS: No focal consolidation, pulmonary edema, pleural effusion or pneumothorax.   CARDIOMEDIASTINAL SILHOUETTE: Mild cardiomegaly.   OTHER: No obvious displaced rib fracture is identified as imaged.       No acute pulmonary process.   MACRO None   Signed by: Cj Castillo 3/16/2025 1:40 PM       Problem List:     Patient Active Problem List   Diagnosis    Atherosclerosis of native coronary artery of native heart with angina pectoris    Permanent atrial fibrillation (Multi)    Long term current use of anticoagulant therapy    Primary hypertension    Hyperlipidemia    Obstructive sleep apnea    Chronic venous insufficiency    Lymphedema    Morbid obesity (Multi)    Idiopathic chronic venous hypertension of left leg with inflammation    Idiopathic chronic venous hypertension of right leg with inflammation    Left chronic serous otitis media    Mixed conductive and sensorineural hearing loss of left ear with  restricted hearing of right ear    Sensorineural hearing loss (SNHL) of right ear with restricted hearing of left ear    Otorrhea of left ear    Idiopathic polyneuropathy    NSTEMI (non-ST elevated myocardial infarction) (Multi)       Asessment:     Problem List Items Addressed This Visit           ICD-10-CM    Permanent atrial fibrillation (Multi) I48.21    Relevant Orders    Follow Up In Cardiology    Comprehensive Metabolic Panel    CBC    Lipid Panel    Long term current use of anticoagulant therapy Z79.01    Relevant Orders    Follow Up In Cardiology    Comprehensive Metabolic Panel    CBC    Lipid Panel    Primary hypertension I10    Relevant Orders    Follow Up In Cardiology    Comprehensive Metabolic Panel    CBC    Lipid Panel    Hyperlipidemia E78.5    Relevant Orders    Follow Up In Cardiology    Comprehensive Metabolic Panel    CBC    Lipid Panel    Lymphedema I89.0    Relevant Orders    Follow Up In Cardiology    Comprehensive Metabolic Panel    CBC    Lipid Panel    Idiopathic chronic venous hypertension of left leg with inflammation I87.322    Relevant Orders    Follow Up In Cardiology    Comprehensive Metabolic Panel    CBC    Lipid Panel    Idiopathic chronic venous hypertension of right leg with inflammation I87.321    Relevant Orders    Follow Up In Cardiology    Comprehensive Metabolic Panel    CBC    Lipid Panel    Idiopathic polyneuropathy G60.9    Relevant Orders    Follow Up In Cardiology    Comprehensive Metabolic Panel    CBC    Lipid Panel     Other Visit Diagnoses         Codes      Chest pain, unspecified type     R07.9    Relevant Orders    Follow Up In Cardiology    Comprehensive Metabolic Panel    CBC    Lipid Panel            I, Kacey Messer CMA   am scribing for, and in the presence of Dr. Saad Soto MD, FACC.    I, Dr. Saad Soto MD, FACC, personally performed the services described in the documentation as scribed by Kacey Messer CMA   in my presence, and confirm  it is both accurate and complete.      Provider Attestation - Scribe documentation    The above portion of this note was dictated by me using voice recognition software.  All medical record entries made by the scribe were at my direction.  The scribe entering the documentation was in my presence.   I have reviewed the chart and agree that the record accurately reflects my personal performance of the history, physical exam, discussion and plan.

## 2025-06-12 NOTE — PATIENT INSTRUCTIONS
Follow up 6 months  Fasting labs to be done approximately 1 week prior to next appointment. We are a paperless office.  The order is in the computer and you can go to any  lab to have done. You can always ask for order to be printed if you'd like to go to outside lab.        DID YOU KNOW  We have a pharmacy here in the [facility].  They can fill all prescriptions, not just cardiac medications.  Prescriptions from other pharmacies can easily be transferred to the  pharmacy by the  pharmacist on site.   pharmacies offer FREE HOME DELIVERY on medications to anywhere in [place]. They can sync your medications. Typically prescriptions can be ready in 10 - 15 minutes. If pharmacy is unable to fill your  prescription or if cost is more than your paying now the Pharmacist can easily transfer back to your Pharmacy of choice. Pharmacy phone # [phone].     Please bring all medicines, vitamins, and herbal supplements with you in original bottles to every appointment!!!!    Prescriptions will not be filled unless you are compliant with your follow up appointments or have a follow up appointment scheduled as per instruction of your physician. Refills should be requested at the time of your visit.

## 2025-06-13 ENCOUNTER — PATIENT MESSAGE (OUTPATIENT)
Dept: CARDIOLOGY | Facility: CLINIC | Age: 80
End: 2025-06-13

## 2025-06-13 ENCOUNTER — APPOINTMENT (OUTPATIENT)
Dept: OTOLARYNGOLOGY | Facility: CLINIC | Age: 80
End: 2025-06-13
Payer: MEDICARE

## 2025-06-13 DIAGNOSIS — H61.23 BILATERAL IMPACTED CERUMEN: Primary | ICD-10-CM

## 2025-06-13 LAB
ALBUMIN SERPL-MCNC: 4.3 G/DL (ref 3.6–5.1)
ALP SERPL-CCNC: 71 U/L (ref 35–144)
ALT SERPL-CCNC: 29 U/L (ref 9–46)
ANION GAP SERPL CALCULATED.4IONS-SCNC: 10 MMOL/L (CALC) (ref 7–17)
AST SERPL-CCNC: 22 U/L (ref 10–35)
BILIRUB SERPL-MCNC: 0.8 MG/DL (ref 0.2–1.2)
BUN SERPL-MCNC: 17 MG/DL (ref 7–25)
CALCIUM SERPL-MCNC: 8.9 MG/DL (ref 8.6–10.3)
CHLORIDE SERPL-SCNC: 101 MMOL/L (ref 98–110)
CHOLEST SERPL-MCNC: 131 MG/DL
CHOLEST/HDLC SERPL: 3.6 (CALC)
CO2 SERPL-SCNC: 27 MMOL/L (ref 20–32)
CREAT SERPL-MCNC: 1.07 MG/DL (ref 0.7–1.22)
EGFRCR SERPLBLD CKD-EPI 2021: 70 ML/MIN/1.73M2
ERYTHROCYTE [DISTWIDTH] IN BLOOD BY AUTOMATED COUNT: 12.6 % (ref 11–15)
GLUCOSE SERPL-MCNC: 131 MG/DL (ref 65–99)
HCT VFR BLD AUTO: 44.4 % (ref 38.5–50)
HDLC SERPL-MCNC: 36 MG/DL
HGB BLD-MCNC: 14.5 G/DL (ref 13.2–17.1)
LDLC SERPL CALC-MCNC: 77 MG/DL (CALC)
MCH RBC QN AUTO: 32.7 PG (ref 27–33)
MCHC RBC AUTO-ENTMCNC: 32.7 G/DL (ref 32–36)
MCV RBC AUTO: 100 FL (ref 80–100)
NONHDLC SERPL-MCNC: 95 MG/DL (CALC)
PLATELET # BLD AUTO: 134 THOUSAND/UL (ref 140–400)
PMV BLD REES-ECKER: 12.9 FL (ref 7.5–12.5)
POTASSIUM SERPL-SCNC: 4.5 MMOL/L (ref 3.5–5.3)
PROT SERPL-MCNC: 6.5 G/DL (ref 6.1–8.1)
RBC # BLD AUTO: 4.44 MILLION/UL (ref 4.2–5.8)
SODIUM SERPL-SCNC: 138 MMOL/L (ref 135–146)
TRIGL SERPL-MCNC: 95 MG/DL
WBC # BLD AUTO: 4.4 THOUSAND/UL (ref 3.8–10.8)

## 2025-06-13 PROCEDURE — 1159F MED LIST DOCD IN RCRD: CPT | Performed by: OTOLARYNGOLOGY

## 2025-06-13 PROCEDURE — 99213 OFFICE O/P EST LOW 20 MIN: CPT | Performed by: OTOLARYNGOLOGY

## 2025-06-13 PROCEDURE — 1160F RVW MEDS BY RX/DR IN RCRD: CPT | Performed by: OTOLARYNGOLOGY

## 2025-06-13 RX ORDER — CIPROFLOXACIN AND DEXAMETHASONE 3; 1 MG/ML; MG/ML
4 SUSPENSION/ DROPS AURICULAR (OTIC) 2 TIMES DAILY
Qty: 7.5 ML | Refills: 0 | Status: SHIPPED | OUTPATIENT
Start: 2025-06-13 | End: 2025-06-27

## 2025-06-13 NOTE — PROGRESS NOTES
Subjective   Patient ID: Cesar Myles is a 79 y.o. male who presents for 6 MONTH FU ON EARS.    HPI  The patient returns, being seen for 6-month follow-up on ears. History of left PE tube with granulation tissue and otorrhea. Feels that left ear is blocked with decreased hearing.     All remaining head neck inquiry otherwise negative.     Review of Systems   Constitutional: Negative.    HENT: Negative.     Respiratory: Negative.     Cardiovascular: Negative.    Neurological: Negative.      Physical Exam  General appearance: No acute distress. Normal facies. Symmetric facial movement. No gross lesions of the face are noted.  Ears:  Bilateral cerumen was removed under the microscope with use of curette, alligator forceps, and hydrogen peroxide with suction as necessary. Following the removal, the ear structures appear to be otherwise grossly normal.  Left PE tube intact with blocked with cerumen which was also removed.   Nose:  Anterior rhinoscopy notes essentially a midline nasal septum. Examination is noted for normal healthy mucosal membranes without any evidence of lesions, polyps, or exudate.   Throat/Oral mucosa:  The tongue is normally mobile. There are no lesions on the gingiva, buccal, or oral mucosa. There are no oral cavity masses.  Neck:  The neck is negative for mass lymphadenopathy. The trachea and parotid are clear. The thyroid bed is grossly unremarkable. The salivary gland structures are grossly unremarkable.    Subjective   Patient ID: Cesar Myles is a 79 y.o. male who presents for 6 MONTH FU ON EARS.    HPI  The patient returns, being seen for evaluation of ears. History of cerumen impaction, here today for removal of same. She denies otalgia, otorrhea, tinnitus, decreased hearing, dizziness, vertigo. All remaining head neck inquiry otherwise negative.     Physical Exam & Procedure:  Bilateral impacted cerumen removed under otomicroscopic examination using suction, curette, and forceps.  Patient tolerated procedure without difficulty. Following removal, TMs are intact with no sign of infection, effusion, retraction, or perforation.     Assessment/Plan   Patient presents for evaluation of cerumen impaction. The ears were cleaned using otomicroscope and instrumentation.  Patient tolerated the procedure well. All questions were answered to patient's satisfaction. Patient to follow-up as needed.     Assessment/Plan   Patient presents for evaluation of cerumen impaction. The ears were cleaned including granulation tissue and cerumen impacted inside left PE tube using otomicroscope and instrumentation.  Patient tolerated the procedure well. Will prescribe course of Ciprodex and see him for follow-up in 3 weeks if he remains symptomatic. If he does well, will see him for routine follow-up in 6 months. All questions were answered to patient's satisfaction. Patient to follow-up as needed.

## 2025-07-02 ENCOUNTER — APPOINTMENT (OUTPATIENT)
Dept: OTOLARYNGOLOGY | Facility: CLINIC | Age: 80
End: 2025-07-02
Payer: MEDICARE

## 2025-07-02 DIAGNOSIS — H65.22 LEFT CHRONIC SEROUS OTITIS MEDIA: Primary | ICD-10-CM

## 2025-07-02 PROCEDURE — 1160F RVW MEDS BY RX/DR IN RCRD: CPT | Performed by: OTOLARYNGOLOGY

## 2025-07-02 PROCEDURE — 1159F MED LIST DOCD IN RCRD: CPT | Performed by: OTOLARYNGOLOGY

## 2025-07-02 PROCEDURE — 99212 OFFICE O/P EST SF 10 MIN: CPT | Performed by: OTOLARYNGOLOGY

## 2025-07-02 NOTE — PROGRESS NOTES
Subjective   Patient ID: Cesar Myles is a 79 y.o. male who presents for 3 WEEK FU ON EARS.    HPI  The patient returns, being seen for 3-week follow-up on ears. At last visit left PE tube was somewhat occluded with cerumen and granulation tissue. He has been using Ciprodex drops as prescribed.     All remaining head neck inquiry otherwise negative.     Physical Exam  Ears:  The external ear structures appear normal. The ear canals patent and the tympanic membranes are intact without evidence of air-fluid levels, retraction, or congenital defects. Left PE tube is intact and patent.    Assessment/Plan   79yr old male patient returns for evaluation of left ear. PE tube was previously occluded for which we prescribed Ciprodex drops. Things look better in the ear and we will see him back for follow-up in 6 months, sooner with issue.

## 2025-07-11 ENCOUNTER — OFFICE VISIT (OUTPATIENT)
Age: 80
End: 2025-07-11
Payer: MEDICARE

## 2025-07-11 VITALS
SYSTOLIC BLOOD PRESSURE: 130 MMHG | HEART RATE: 73 BPM | BODY MASS INDEX: 35.73 KG/M2 | OXYGEN SATURATION: 95 % | DIASTOLIC BLOOD PRESSURE: 70 MMHG | WEIGHT: 249 LBS

## 2025-07-11 DIAGNOSIS — R06.02 SHORTNESS OF BREATH: ICD-10-CM

## 2025-07-11 DIAGNOSIS — J44.9 CHRONIC OBSTRUCTIVE PULMONARY DISEASE, UNSPECIFIED COPD TYPE (HCC): Primary | ICD-10-CM

## 2025-07-11 DIAGNOSIS — G47.33 OSA (OBSTRUCTIVE SLEEP APNEA): ICD-10-CM

## 2025-07-11 DIAGNOSIS — E66.9 OBESITY (BMI 30-39.9): ICD-10-CM

## 2025-07-11 PROCEDURE — 1159F MED LIST DOCD IN RCRD: CPT | Performed by: INTERNAL MEDICINE

## 2025-07-11 PROCEDURE — 99214 OFFICE O/P EST MOD 30 MIN: CPT | Performed by: INTERNAL MEDICINE

## 2025-07-11 PROCEDURE — 1123F ACP DISCUSS/DSCN MKR DOCD: CPT | Performed by: INTERNAL MEDICINE

## 2025-07-11 RX ORDER — ALBUTEROL SULFATE 90 UG/1
2 INHALANT RESPIRATORY (INHALATION) EVERY 6 HOURS PRN
Qty: 18 G | Refills: 3 | Status: SHIPPED | OUTPATIENT
Start: 2025-07-11 | End: 2025-07-11

## 2025-07-11 RX ORDER — CLOPIDOGREL BISULFATE 75 MG/1
75 TABLET ORAL DAILY
COMMUNITY

## 2025-07-11 RX ORDER — ALBUTEROL SULFATE 90 UG/1
2 INHALANT RESPIRATORY (INHALATION) EVERY 6 HOURS PRN
Qty: 3 EACH | Refills: 0 | Status: SHIPPED | OUTPATIENT
Start: 2025-07-11

## 2025-07-11 NOTE — PROGRESS NOTES
Drug use: No    Sexual activity: Not on file   Other Topics Concern    Not on file   Social History Narrative    Not on file     Social Drivers of Health     Financial Resource Strain: Low Risk  (3/16/2025)    Received from [facility]    Overall Financial Resource Strain (CARDIA)     Difficulty of Paying Living Expenses: Not hard at all   Food Insecurity: No Food Insecurity (3/16/2025)    Received from [facility]    Hunger Vital Sign     Worried About Running Out of Food in the Last Year: Never true     Ran Out of Food in the Last Year: Never true   Transportation Needs: No Transportation Needs (3/18/2025)    Received from [facility]    PRAPARE - Transportation     Lack of Transportation (Medical): No     Lack of Transportation (Non-Medical): No   Physical Activity: Insufficiently Active (3/16/2025)    Received from [facility]    Exercise Vital Sign     Days of Exercise per Week: 5 days     Minutes of Exercise per Session: 20 min   Stress: No Stress Concern Present (3/16/2025)    Received from [facility]    [employer] - Occupational Stress Questionnaire     Feeling of Stress : Not at all   Social Connections: Moderately Integrated (3/16/2025)    Received from [facility]    Social Connection and Isolation Panel [NHANES]     Frequency of Communication with Friends and Family: More than three times a week     Frequency of Social Gatherings with Friends and Family: More than three times a week     Attends [employer] Services: 1 to 4 times per year     Active Member of Clubs or Organizations: No     Attends Club or Organization Meetings: Never     Marital Status:    Intimate Partner Violence: Not At Risk (3/16/2025)    Received from [facility]    Humiliation, Afraid, Rape, and Kick questionnaire     Fear of Current or

## 2025-10-13 ENCOUNTER — APPOINTMENT (OUTPATIENT)
Dept: CARDIOLOGY | Facility: CLINIC | Age: 80
End: 2025-10-13
Payer: MEDICARE

## 2025-12-11 ENCOUNTER — APPOINTMENT (OUTPATIENT)
Dept: CARDIOLOGY | Facility: CLINIC | Age: 80
End: 2025-12-11
Payer: MEDICARE

## 2025-12-15 ENCOUNTER — APPOINTMENT (OUTPATIENT)
Dept: OTOLARYNGOLOGY | Facility: CLINIC | Age: 80
End: 2025-12-15
Payer: MEDICARE

## 2026-01-05 ENCOUNTER — APPOINTMENT (OUTPATIENT)
Dept: OTOLARYNGOLOGY | Facility: CLINIC | Age: 81
End: 2026-01-05
Payer: MEDICARE

## 2026-03-11 ENCOUNTER — APPOINTMENT (OUTPATIENT)
Dept: PRIMARY CARE | Facility: CLINIC | Age: 81
End: 2026-03-11
Payer: MEDICARE

## (undated) DEVICE — FORCEPS BX L240CM JAW DIA2.8MM L CAP W/ NDL MIC MESH TOOTH

## (undated) DEVICE — GUIDEWIRE, UNIVERSAL BALANCE MID WEIGHT, 190CM, STR

## (undated) DEVICE — SHEATH, PINNACLE, W/.038 GUIDEWIRE, 10 CM,  6FR INTRODUCER, 6FR DIA, 2.5 CM DIALATOR

## (undated) DEVICE — PATIENT RETURN ELECTRODE, SINGLE-USE, NON CONTACT QUALITY MONITORING, ADULT, WITH 9 FT (2.7 M) CORD, FOR PATIENTS WEIGHING OVER 33LBS. (15KG): Brand: MEGADYNE

## (undated) DEVICE — CATHETER, BALLOON DILATION, EUPHORA SEMICOMPLIANT 3.00  X 15 MM X 142CM

## (undated) DEVICE — CATHETER SCLERO L240CM NDL 25GA L4MM SHTH DIA2.3MM CNTRST

## (undated) DEVICE — TUBING, SUCTION, 1/4" X 10', STRAIGHT: Brand: MEDLINE

## (undated) DEVICE — BRUSH ENDO CLN L90.5IN SHTH DIA1.7MM BRIST DIA5-7MM 2-6MM

## (undated) DEVICE — Device

## (undated) DEVICE — TUBING, MANIFOLD, LOW PRESSURE

## (undated) DEVICE — TUBE SET 96 MM 64 MM H2O PERISTALTIC STD AUX CHANNEL

## (undated) DEVICE — BANDAGE, QUIKCLOT, INTERVENTIONAL HEMO, W/O SLIT

## (undated) DEVICE — CATHETER, INFINITI DIAGNOSTIC, 5 FR 100CM 3DRC, WILLIAMS RIGHT OR NO TORQUE

## (undated) DEVICE — CATHETER, DIAGNOSTIC, JUDKINS, LEFT, 5 FR-JL 4.0

## (undated) DEVICE — SNARE ENDOSCP L240CM SHTH DIA24MM LOOP W10MM POLYP RND REINF

## (undated) DEVICE — CATHETER, DIAGNOSTIC, 5FR,  PIG-145, 110CM, 6SH ANGLED

## (undated) DEVICE — SHEATH, PINNACLE, W/.038 GW 10CM, 5FR INTRODUCER, 2.5 CM DIALATOR

## (undated) DEVICE — TRAP POLYP BALEEN

## (undated) DEVICE — CLOSURE SYSTEM, VASCULAR, VASCADE 6/7F VCS

## (undated) DEVICE — ENDO CARRY-ON PROCEDURE KIT: Brand: ENDO CARRY-ON PROCEDURE KIT

## (undated) DEVICE — SINGLE PORT MANIFOLD: Brand: NEPTUNE 2

## (undated) DEVICE — Device: Brand: ENDO SMARTCAP

## (undated) DEVICE — DEVICE, INFLATION, BASIXSKY, 30ATM BAR 20ML, MAP802 PHD, INSERT TOOL TORQUE, METAL